# Patient Record
Sex: MALE | Race: WHITE | Employment: FULL TIME | URBAN - METROPOLITAN AREA
[De-identification: names, ages, dates, MRNs, and addresses within clinical notes are randomized per-mention and may not be internally consistent; named-entity substitution may affect disease eponyms.]

---

## 2017-08-10 ENCOUNTER — OFFICE VISIT (OUTPATIENT)
Dept: INTERNAL MEDICINE CLINIC | Age: 22
End: 2017-08-10

## 2017-08-10 VITALS
OXYGEN SATURATION: 97 % | TEMPERATURE: 98.2 F | BODY MASS INDEX: 25.11 KG/M2 | HEIGHT: 67 IN | WEIGHT: 160 LBS | SYSTOLIC BLOOD PRESSURE: 128 MMHG | HEART RATE: 101 BPM | RESPIRATION RATE: 16 BRPM | DIASTOLIC BLOOD PRESSURE: 76 MMHG

## 2017-08-10 DIAGNOSIS — M25.511 RIGHT SHOULDER PAIN, UNSPECIFIED CHRONICITY: Primary | ICD-10-CM

## 2017-08-10 PROCEDURE — 99202 OFFICE O/P NEW SF 15 MIN: CPT | Performed by: INTERNAL MEDICINE

## 2017-08-10 RX ORDER — BUPROPION HYDROCHLORIDE 200 MG/1
200 TABLET, EXTENDED RELEASE ORAL
COMMUNITY
Start: 2017-05-30 | End: 2020-08-10 | Stop reason: CLARIF

## 2017-08-10 ASSESSMENT — PATIENT HEALTH QUESTIONNAIRE - PHQ9
10. IF YOU CHECKED OFF ANY PROBLEMS, HOW DIFFICULT HAVE THESE PROBLEMS MADE IT FOR YOU TO DO YOUR WORK, TAKE CARE OF THINGS AT HOME, OR GET ALONG WITH OTHER PEOPLE: 1
SUM OF ALL RESPONSES TO PHQ QUESTIONS 1-9: 8
5. POOR APPETITE OR OVEREATING: 0
8. MOVING OR SPEAKING SO SLOWLY THAT OTHER PEOPLE COULD HAVE NOTICED. OR THE OPPOSITE, BEING SO FIGETY OR RESTLESS THAT YOU HAVE BEEN MOVING AROUND A LOT MORE THAN USUAL: 1
4. FEELING TIRED OR HAVING LITTLE ENERGY: 1
1. LITTLE INTEREST OR PLEASURE IN DOING THINGS: 1
SUM OF ALL RESPONSES TO PHQ9 QUESTIONS 1 & 2: 3
9. THOUGHTS THAT YOU WOULD BE BETTER OFF DEAD, OR OF HURTING YOURSELF: 0
6. FEELING BAD ABOUT YOURSELF - OR THAT YOU ARE A FAILURE OR HAVE LET YOURSELF OR YOUR FAMILY DOWN: 0
3. TROUBLE FALLING OR STAYING ASLEEP: 1
7. TROUBLE CONCENTRATING ON THINGS, SUCH AS READING THE NEWSPAPER OR WATCHING TELEVISION: 2
2. FEELING DOWN, DEPRESSED OR HOPELESS: 2

## 2018-08-27 ENCOUNTER — TELEPHONE (OUTPATIENT)
Dept: INTERNAL MEDICINE CLINIC | Age: 23
End: 2018-08-27

## 2019-08-28 ENCOUNTER — OFFICE VISIT (OUTPATIENT)
Dept: PRIMARY CARE CLINIC | Age: 24
End: 2019-08-28
Payer: COMMERCIAL

## 2019-08-28 VITALS
RESPIRATION RATE: 12 BRPM | HEART RATE: 84 BPM | WEIGHT: 170.4 LBS | HEIGHT: 67 IN | SYSTOLIC BLOOD PRESSURE: 120 MMHG | TEMPERATURE: 98.1 F | DIASTOLIC BLOOD PRESSURE: 80 MMHG | OXYGEN SATURATION: 99 % | BODY MASS INDEX: 26.74 KG/M2

## 2019-08-28 DIAGNOSIS — Z23 NEED FOR TDAP VACCINATION: ICD-10-CM

## 2019-08-28 DIAGNOSIS — Z00.00 ANNUAL PHYSICAL EXAM: ICD-10-CM

## 2019-08-28 DIAGNOSIS — R10.11 RIGHT UPPER QUADRANT ABDOMINAL PAIN: ICD-10-CM

## 2019-08-28 DIAGNOSIS — K59.00 CONSTIPATION, UNSPECIFIED CONSTIPATION TYPE: ICD-10-CM

## 2019-08-28 DIAGNOSIS — Z00.00 ANNUAL PHYSICAL EXAM: Primary | ICD-10-CM

## 2019-08-28 LAB
A/G RATIO: 2.6 (ref 1.1–2.2)
ALBUMIN SERPL-MCNC: 4.9 G/DL (ref 3.4–5)
ALP BLD-CCNC: 77 U/L (ref 40–129)
ALT SERPL-CCNC: 29 U/L (ref 10–40)
ANION GAP SERPL CALCULATED.3IONS-SCNC: 14 MMOL/L (ref 3–16)
AST SERPL-CCNC: 22 U/L (ref 15–37)
BASOPHILS ABSOLUTE: 0 K/UL (ref 0–0.2)
BASOPHILS RELATIVE PERCENT: 0.5 %
BILIRUB SERPL-MCNC: 0.3 MG/DL (ref 0–1)
BILIRUBIN URINE: NEGATIVE
BLOOD, URINE: NEGATIVE
BUN BLDV-MCNC: 16 MG/DL (ref 7–20)
CALCIUM SERPL-MCNC: 9.6 MG/DL (ref 8.3–10.6)
CHLORIDE BLD-SCNC: 104 MMOL/L (ref 99–110)
CHOLESTEROL, TOTAL: 90 MG/DL (ref 0–199)
CLARITY: CLEAR
CO2: 24 MMOL/L (ref 21–32)
COLOR: YELLOW
CREAT SERPL-MCNC: 1.1 MG/DL (ref 0.9–1.3)
EOSINOPHILS ABSOLUTE: 0.2 K/UL (ref 0–0.6)
EOSINOPHILS RELATIVE PERCENT: 3.6 %
GFR AFRICAN AMERICAN: >60
GFR NON-AFRICAN AMERICAN: >60
GLOBULIN: 1.9 G/DL
GLUCOSE BLD-MCNC: 108 MG/DL (ref 70–99)
GLUCOSE URINE: NEGATIVE MG/DL
HCT VFR BLD CALC: 46.3 % (ref 40.5–52.5)
HDLC SERPL-MCNC: 32 MG/DL (ref 40–60)
HEMOGLOBIN: 15.4 G/DL (ref 13.5–17.5)
KETONES, URINE: NEGATIVE MG/DL
LDL CHOLESTEROL CALCULATED: 31 MG/DL
LEUKOCYTE ESTERASE, URINE: NEGATIVE
LYMPHOCYTES ABSOLUTE: 1.5 K/UL (ref 1–5.1)
LYMPHOCYTES RELATIVE PERCENT: 22.9 %
MCH RBC QN AUTO: 30.7 PG (ref 26–34)
MCHC RBC AUTO-ENTMCNC: 33.3 G/DL (ref 31–36)
MCV RBC AUTO: 92.1 FL (ref 80–100)
MICROSCOPIC EXAMINATION: NORMAL
MONOCYTES ABSOLUTE: 0.7 K/UL (ref 0–1.3)
MONOCYTES RELATIVE PERCENT: 10.2 %
NEUTROPHILS ABSOLUTE: 4.2 K/UL (ref 1.7–7.7)
NEUTROPHILS RELATIVE PERCENT: 62.8 %
NITRITE, URINE: NEGATIVE
PDW BLD-RTO: 13.3 % (ref 12.4–15.4)
PH UA: 5 (ref 5–8)
PLATELET # BLD: 234 K/UL (ref 135–450)
PMV BLD AUTO: 9 FL (ref 5–10.5)
POTASSIUM SERPL-SCNC: 4.7 MMOL/L (ref 3.5–5.1)
PROTEIN UA: NEGATIVE MG/DL
RBC # BLD: 5.02 M/UL (ref 4.2–5.9)
SODIUM BLD-SCNC: 142 MMOL/L (ref 136–145)
SPECIFIC GRAVITY UA: 1.02 (ref 1–1.03)
TOTAL PROTEIN: 6.8 G/DL (ref 6.4–8.2)
TRIGL SERPL-MCNC: 134 MG/DL (ref 0–150)
TSH SERPL DL<=0.05 MIU/L-ACNC: 1.94 UIU/ML (ref 0.27–4.2)
URINE TYPE: NORMAL
UROBILINOGEN, URINE: 0.2 E.U./DL
VLDLC SERPL CALC-MCNC: 27 MG/DL
WBC # BLD: 6.7 K/UL (ref 4–11)

## 2019-08-28 PROCEDURE — 90715 TDAP VACCINE 7 YRS/> IM: CPT | Performed by: INTERNAL MEDICINE

## 2019-08-28 PROCEDURE — 90471 IMMUNIZATION ADMIN: CPT | Performed by: INTERNAL MEDICINE

## 2019-08-28 PROCEDURE — 99395 PREV VISIT EST AGE 18-39: CPT | Performed by: INTERNAL MEDICINE

## 2019-08-28 RX ORDER — DOCUSATE SODIUM 100 MG/1
100 CAPSULE, LIQUID FILLED ORAL 2 TIMES DAILY
Qty: 60 CAPSULE | Refills: 3 | Status: SHIPPED | OUTPATIENT
Start: 2019-08-28 | End: 2019-11-19

## 2019-08-28 RX ORDER — POLYETHYLENE GLYCOL 3350 17 G/17G
17 POWDER, FOR SOLUTION ORAL DAILY
Qty: 510 G | Refills: 3 | Status: SHIPPED | OUTPATIENT
Start: 2019-08-28 | End: 2019-09-27

## 2019-08-28 ASSESSMENT — PATIENT HEALTH QUESTIONNAIRE - PHQ9
SUM OF ALL RESPONSES TO PHQ QUESTIONS 1-9: 0
SUM OF ALL RESPONSES TO PHQ9 QUESTIONS 1 & 2: 0
2. FEELING DOWN, DEPRESSED OR HOPELESS: 0
SUM OF ALL RESPONSES TO PHQ QUESTIONS 1-9: 0
1. LITTLE INTEREST OR PLEASURE IN DOING THINGS: 0

## 2019-08-28 ASSESSMENT — ENCOUNTER SYMPTOMS
SINUS PAIN: 0
ABDOMINAL DISTENTION: 0
SORE THROAT: 0
ABDOMINAL PAIN: 1
EYE PAIN: 0
TROUBLE SWALLOWING: 0
FACIAL SWELLING: 0
ANAL BLEEDING: 0
EYE DISCHARGE: 0
WHEEZING: 0
EYE ITCHING: 0
NAUSEA: 0
RECTAL PAIN: 0
RHINORRHEA: 0
CONSTIPATION: 1
COUGH: 0
DIARRHEA: 1
VOMITING: 0
SINUS PRESSURE: 0
APNEA: 0
VOICE CHANGE: 0
SHORTNESS OF BREATH: 0
BLOOD IN STOOL: 0
CHEST TIGHTNESS: 0
PHOTOPHOBIA: 0
CHOKING: 0
BACK PAIN: 0
EYE REDNESS: 0

## 2019-08-28 NOTE — PATIENT INSTRUCTIONS
outdoors from 10 a.m. to 4 p.m., stay in the shade or cover up with clothing and a hat with a wide brim. Wear sunglasses that block UV rays. Even when it's cloudy, put broad-spectrum sunscreen (SPF 30 or higher) on any exposed skin. · See a dentist one or two times a year for checkups and to have your teeth cleaned. · Wear a seat belt in the car. Follow your doctor's advice about when to have certain tests. These tests can spot problems early. For everyone  · Cholesterol. Have the fat (cholesterol) in your blood tested after age 21. Your doctor will tell you how often to have this done based on your age, family history, or other things that can increase your risk for heart disease. · Blood pressure. Have your blood pressure checked during a routine doctor visit. Your doctor will tell you how often to check your blood pressure based on your age, your blood pressure results, and other factors. · Vision. Talk with your doctor about how often to have a glaucoma test.  · Diabetes. Ask your doctor whether you should have tests for diabetes. · Colon cancer. Your risk for colorectal cancer gets higher as you get older. Some experts say that adults should start regular screening at age 48 and stop at age 76. Others say to start before age 48 or continue after age 76. Talk with your doctor about your risk and when to start and stop screening. For women  · Breast exam and mammogram. Talk to your doctor about when you should have a clinical breast exam and a mammogram. Medical experts differ on whether and how often women under 50 should have these tests. Your doctor can help you decide what is right for you. · Cervical cancer screening test and pelvic exam. Begin with a Pap test at age 24. The test often is part of a pelvic exam. Starting at age 27, you may choose to have a Pap test, an HPV test, or both tests at the same time (called co-testing). Talk with your doctor about how often to have testing.   · Tests for complications from pertussis. Another vaccine, called Td, protects against tetanus and diphtheria, but not pertussis. A Td booster should be given every 10 years. Tdap may be given as one of these boosters if you have never gotten Tdap before. Tdap may also be given after a severe cut or burn to prevent tetanus infection. Your doctor or the person giving you the vaccine can give you more information. Tdap may safely be given at the same time as other vaccines. Some people should not get this vaccine  · A person who has ever had a life-threatening allergic reaction after a previous dose of any diphtheria-, tetanus-, or pertussis-containing vaccine, OR has a severe allergy to any part of this vaccine, should not get Tdap vaccine. Tell the person giving the vaccine about any severe allergies. · Anyone who had coma or long repeated seizures within 7 days after a childhood dose of DTP or DTaP, or a previous dose of Tdap, should not get Tdap, unless a cause other than the vaccine was found. They can still get Td. · Talk to your doctor if you:  ? Have seizures or another nervous system problem. ? Had severe pain or swelling after any vaccine containing diphtheria, tetanus, or pertussis. ? Ever had a condition called Guillain-Barré Syndrome (GBS). ? Aren't feeling well on the day the shot is scheduled. Risks  With any medicine, including vaccines, there is a chance of side effects. These are usually mild and go away on their own. Serious reactions are also possible but are rare. Most people who get Tdap vaccine do not have any problems with it.   Mild problems following Tdap  (Did not interfere with activities)  · Pain where the shot was given (about 3 in 4 adolescents or 2 in 3 adults)  · Redness or swelling where the shot was given (about 1 person in 5)  · Mild fever of at least 100.4°F (up to about 1 in 25 adolescents or 1 in 100 adults)  · Headache (about 3 or 4 people in 10)  · Tiredness (about 1 person in 3 reaction, very high fever, or unusual behavior. Signs of a severe allergic reaction can include hives, swelling of the face and throat, difficulty breathing, a fast heartbeat, dizziness, and weakness. These would usually start a few minutes to a few hours after the vaccination. What should I do? · If you think it is a severe allergic reaction or other emergency that can't wait, call 9-1-1 or get the person to the nearest hospital. Otherwise, call your doctor. · Afterward, the reaction should be reported to the Vaccine Adverse Event Reporting System (VAERS). Your doctor might file this report, or you can do it yourself through the VAERS web site at www.vaers. [facility].gov, or by calling 1-337.617.4927. VAERS does not give medical advice. The National Vaccine Injury Compensation Program  The National Vaccine Injury Compensation Program (VICP) is a federal program that was created to compensate people who may have been injured by certain vaccines. Persons who believe they may have been injured by a vaccine can learn about the program and about filing a claim by calling 7-603.915.6709 or visiting the [employer] website at www.[facility].gov/vaccinecompensation. There is a time limit to file a claim for compensation. How can I learn more? · Ask your doctor. He or she can give you the vaccine package insert or suggest other sources of information. · Call your local or state health department. · Contact the Centers for Disease Control and Prevention (CDC):  ? Call 8-617.374.4903 (1-800-CDC-INFO) or  ? Visit CDC's website at www.cdc.gov/vaccines  Vaccine Information Statement (Interim)  Tdap Vaccine  (2/24/15)  [address] 466AC-10  Department of Health and Human Services  Centers for Disease Control and Prevention  Many Vaccine Information Statements are available in [place] and other languages. See www.immunize.org/vis. Muchas hojas de información sobre vacunas están disponibles en español y en otros idiomas.  Visite www.immunize.org/vis. Care instructions adapted under license by [address] If you have questions about a medical condition or this instruction, always ask your healthcare professional. [address] any warranty or liability for your use of this information.

## 2019-08-28 NOTE — PROGRESS NOTES
supple. Carotid bruit is not present. No thyromegaly present. Cardiovascular: Normal rate, regular rhythm, S1 normal, S2 normal, normal heart sounds and intact distal pulses. Exam reveals no gallop and no friction rub. No murmur heard. Pulmonary/Chest: Effort normal and breath sounds normal. No respiratory distress. He has no wheezes. He has no rhonchi. He has no rales. Abdominal: Soft. Bowel sounds are normal. He exhibits no distension. There is no hepatosplenomegaly. There is no tenderness. There is no rebound and no guarding. Hernia confirmed negative in the right inguinal area and confirmed negative in the left inguinal area. Genitourinary: Testes normal and penis normal.   Musculoskeletal: Normal range of motion. He exhibits no edema or tenderness. Right shoulder: He exhibits normal range of motion and no tenderness. Left shoulder: He exhibits normal range of motion and no tenderness. Right elbow: He exhibits normal range of motion and no swelling. No tenderness found. Left elbow: He exhibits normal range of motion and no swelling. No tenderness found. Right wrist: He exhibits no tenderness and no swelling. Left wrist: He exhibits no tenderness and no swelling. Right hip: He exhibits normal range of motion and no tenderness. Left hip: He exhibits normal range of motion and no tenderness. Right knee: He exhibits normal range of motion and no swelling. No tenderness found. Left knee: He exhibits normal range of motion and no swelling. No tenderness found. Right ankle: He exhibits normal range of motion and no swelling. No tenderness. Left ankle: He exhibits normal range of motion and no swelling. No tenderness. Cervical back: He exhibits normal range of motion, no tenderness and no spasm. Thoracic back: He exhibits no tenderness and no spasm.         Lumbar back: He exhibits normal range of motion, no

## 2019-08-30 ENCOUNTER — HOSPITAL ENCOUNTER (OUTPATIENT)
Dept: ULTRASOUND IMAGING | Age: 24
Discharge: HOME | End: 2019-08-30
Payer: COMMERCIAL

## 2019-08-30 DIAGNOSIS — R10.11 RIGHT UPPER QUADRANT ABDOMINAL PAIN: ICD-10-CM

## 2019-08-30 PROCEDURE — 76700 US EXAM ABDOM COMPLETE: CPT

## 2019-11-19 ENCOUNTER — OFFICE VISIT (OUTPATIENT)
Dept: PRIMARY CARE CLINIC | Age: 24
End: 2019-11-19
Payer: COMMERCIAL

## 2019-11-19 VITALS
HEART RATE: 91 BPM | WEIGHT: 169 LBS | BODY MASS INDEX: 26.47 KG/M2 | SYSTOLIC BLOOD PRESSURE: 132 MMHG | DIASTOLIC BLOOD PRESSURE: 78 MMHG | OXYGEN SATURATION: 98 %

## 2019-11-19 DIAGNOSIS — S43.431A TEAR OF RIGHT GLENOID LABRUM, INITIAL ENCOUNTER: Primary | ICD-10-CM

## 2019-11-19 PROBLEM — F32.A DEPRESSION: Status: ACTIVE | Noted: 2019-11-19

## 2019-11-19 PROBLEM — K59.00 CONSTIPATION: Status: ACTIVE | Noted: 2019-11-19

## 2019-11-19 PROBLEM — F32.A DEPRESSION: Chronic | Status: ACTIVE | Noted: 2019-11-19

## 2019-11-19 PROCEDURE — 99213 OFFICE O/P EST LOW 20 MIN: CPT | Performed by: NURSE PRACTITIONER

## 2019-11-19 ASSESSMENT — ENCOUNTER SYMPTOMS
COUGH: 0
CHEST TIGHTNESS: 0
SHORTNESS OF BREATH: 0
BACK PAIN: 0
WHEEZING: 0
COLOR CHANGE: 0

## 2019-11-22 ENCOUNTER — HOSPITAL ENCOUNTER (OUTPATIENT)
Dept: MRI IMAGING | Age: 24
Discharge: HOME | End: 2019-11-22
Payer: COMMERCIAL

## 2019-11-22 DIAGNOSIS — S43.431A TEAR OF RIGHT GLENOID LABRUM, INITIAL ENCOUNTER: ICD-10-CM

## 2019-11-22 PROCEDURE — 73221 MRI JOINT UPR EXTREM W/O DYE: CPT

## 2019-11-25 ENCOUNTER — OFFICE VISIT (OUTPATIENT)
Dept: ORTHOPEDIC SURGERY | Age: 24
End: 2019-11-25
Payer: COMMERCIAL

## 2019-11-25 VITALS
WEIGHT: 169.09 LBS | BODY MASS INDEX: 26.54 KG/M2 | HEART RATE: 101 BPM | HEIGHT: 67 IN | DIASTOLIC BLOOD PRESSURE: 90 MMHG | SYSTOLIC BLOOD PRESSURE: 132 MMHG

## 2019-11-25 DIAGNOSIS — M19.011 ARTHRITIS OF RIGHT ACROMIOCLAVICULAR JOINT: Primary | ICD-10-CM

## 2019-11-25 DIAGNOSIS — M75.91 SUPRASPINATUS TENDINITIS, RIGHT: ICD-10-CM

## 2019-11-25 PROCEDURE — 99243 OFF/OP CNSLTJ NEW/EST LOW 30: CPT | Performed by: INTERNAL MEDICINE

## 2019-11-25 RX ORDER — KETOROLAC TROMETHAMINE 10 MG/1
10 TABLET, FILM COATED ORAL 3 TIMES DAILY
Qty: 15 TABLET | Refills: 0 | Status: SHIPPED | OUTPATIENT
Start: 2019-11-25 | End: 2020-02-06

## 2020-01-30 ENCOUNTER — OFFICE VISIT (OUTPATIENT)
Dept: PRIMARY CARE CLINIC | Age: 25
End: 2020-01-30
Payer: COMMERCIAL

## 2020-01-30 VITALS
DIASTOLIC BLOOD PRESSURE: 74 MMHG | HEART RATE: 78 BPM | SYSTOLIC BLOOD PRESSURE: 120 MMHG | BODY MASS INDEX: 26.31 KG/M2 | TEMPERATURE: 97.7 F | WEIGHT: 168 LBS | OXYGEN SATURATION: 99 %

## 2020-01-30 PROCEDURE — 99213 OFFICE O/P EST LOW 20 MIN: CPT | Performed by: INTERNAL MEDICINE

## 2020-01-30 RX ORDER — DICLOFENAC SODIUM 75 MG/1
75 TABLET, DELAYED RELEASE ORAL 2 TIMES DAILY
Qty: 30 TABLET | Refills: 3 | Status: SHIPPED | OUTPATIENT
Start: 2020-01-30 | End: 2020-02-15 | Stop reason: SDUPTHER

## 2020-01-30 NOTE — PROGRESS NOTES
Subjective:      Patient ID: Luann Sanchez is a 25 y.o. male. HPI  C/o bilateral wrist and hand pain for the last few weeks,  He types a lot at work,  [employer], that helps,  No tingling or numbness of the hands,   No weakness of hand muscles,  No acute injuries,   Also has mid to lower back pain, for the last 2 days,   Came on suddenly, no radiation of the pain,   No paresthesias,   No injuries or sprains,   No allergy,   Review of Systems  All the review of systems negative except above   Objective:   Physical Exam  /74 (Site: Left Upper Arm, Position: Sitting, Cuff Size: Medium Adult)   Pulse 78   Temp 97.7 °F (36.5 °C) (Oral)   Wt 168 lb (76.2 kg)   SpO2 99%   BMI 26.31 kg/m²    The physical exam reveals a patient who appears well, alert and oriented x 3, pleasant, cooperative. Vitals are as noted. Head is atraumatic and normocephalic. Eyes reveal normal conjunctiva, cornea normal, pupils are equal and rective to light. Nasal mucosa is normal. Throat is normal without exudates. Ears reveal normal tympanic membranes. Neck is supple and free of adenopathy, or masses. No thyromegaly. No jugular venous distension. Lungs are clear to auscultation, no rales or rhonchi noted. Heart sounds are regular , no murmurs, clicks, gallops or rubs. Abdomen is soft, no tenderness, masses or organomegaly. Bowel sounds are normally heard. Pelvis: normal. Extremities are normal. Peripheral pulses are normal. Screening neurological exam is normal without focal findings. Cranial nerves are intact, reflexes are symmetrical and muscle strength eaqual. Skin is normal without suspicious lesions noted. Assessment:      Bilateral hand pain- synovitis. Mid back pain- starin      Plan: Will rty diclofenac 75 mg po biod for 15 days.         Gavi Clark MD

## 2020-02-05 ENCOUNTER — NURSE TRIAGE (OUTPATIENT)
Dept: OTHER | Facility: CLINIC | Age: 25
End: 2020-02-05

## 2020-02-06 ENCOUNTER — OFFICE VISIT (OUTPATIENT)
Dept: PRIMARY CARE CLINIC | Age: 25
End: 2020-02-06
Payer: COMMERCIAL

## 2020-02-06 VITALS
HEART RATE: 100 BPM | TEMPERATURE: 97.9 F | SYSTOLIC BLOOD PRESSURE: 126 MMHG | OXYGEN SATURATION: 100 % | HEIGHT: 67 IN | WEIGHT: 167.2 LBS | DIASTOLIC BLOOD PRESSURE: 88 MMHG | BODY MASS INDEX: 26.24 KG/M2

## 2020-02-06 LAB — HBA1C MFR BLD: 4.8 %

## 2020-02-06 PROCEDURE — 83036 HEMOGLOBIN GLYCOSYLATED A1C: CPT | Performed by: INTERNAL MEDICINE

## 2020-02-06 PROCEDURE — 99213 OFFICE O/P EST LOW 20 MIN: CPT | Performed by: INTERNAL MEDICINE

## 2020-02-06 NOTE — PROGRESS NOTES
exhibits tenderness. He exhibits normal range of motion and no swelling. Left hand: He exhibits normal range of motion, no tenderness and no swelling. Normal sensation noted. Normal strength noted.            Results for POC orders placed in visit on 02/06/20   POCT glycosylated hemoglobin (Hb A1C)   Result Value Ref Range    Hemoglobin A1C 4.8 %     Lab Review   Orders Only on 08/28/2019   Component Date Value    Color, UA 08/28/2019 YELLOW     Clarity, UA 08/28/2019 Clear     Glucose, Ur 08/28/2019 Negative     Bilirubin Urine 08/28/2019 Negative     Ketones, Urine 08/28/2019 Negative     Specific Gravity, UA 08/28/2019 1.017     Blood, Urine 08/28/2019 Negative     pH, UA 08/28/2019 5.0     Protein, UA 08/28/2019 Negative     Urobilinogen, Urine 08/28/2019 0.2     Nitrite, Urine 08/28/2019 Negative     Leukocyte Esterase, Urine 08/28/2019 Negative     Microscopic Examination 08/28/2019 Not Indicated     Urine Type 08/28/2019 Not Specified    Orders Only on 08/28/2019   Component Date Value    TSH 08/28/2019 1.94     Cholesterol, Total 08/28/2019 90     Triglycerides 08/28/2019 134     HDL 08/28/2019 32*    LDL Calculated 08/28/2019 31     VLDL Cholesterol Calcula* 08/28/2019 27     Sodium 08/28/2019 142     Potassium 08/28/2019 4.7     Chloride 08/28/2019 104     CO2 08/28/2019 24     Anion Gap 08/28/2019 14     Glucose 08/28/2019 108*    BUN 08/28/2019 16     CREATININE 08/28/2019 1.1     GFR Non- 08/28/2019 >60     GFR  08/28/2019 >60     Calcium 08/28/2019 9.6     Total Protein 08/28/2019 6.8     Alb 08/28/2019 4.9     Albumin/Globulin Ratio 08/28/2019 2.6*    Total Bilirubin 08/28/2019 0.3     Alkaline Phosphatase 08/28/2019 77     ALT 08/28/2019 29     AST 08/28/2019 22     Globulin 08/28/2019 1.9     WBC 08/28/2019 6.7     RBC 08/28/2019 5.02     Hemoglobin 08/28/2019 15.4     Hematocrit 08/28/2019 46.3     MCV 08/28/2019 92.1  MCH 08/28/2019 30.7     MCHC 08/28/2019 33.3     RDW 08/28/2019 13.3     Platelets 16/68/2898 234     MPV 08/28/2019 9.0     Neutrophils % 08/28/2019 62.8     Lymphocytes % 08/28/2019 22.9     Monocytes % 08/28/2019 10.2     Eosinophils % 08/28/2019 3.6     Basophils % 08/28/2019 0.5     Neutrophils Absolute 08/28/2019 4.2     Lymphocytes Absolute 08/28/2019 1.5     Monocytes Absolute 08/28/2019 0.7     Eosinophils Absolute 08/28/2019 0.2     Basophils Absolute 08/28/2019 0.0          Assessment/Plan     1. Pain in both wrists  - XR WRIST LEFT (2 VIEWS); Future  - XR WRIST RIGHT (2 VIEWS); Future  -Continue Diclofenac 75mg 2 times daily as needed  - Referral to [facility], Hand [facility] (Hand, Wrist, Elbow), [place]    2. Numbness and tingling in left hand  - Referral to [facility] (Hand, Wrist, Elbow), [place]  - EMG; Future    3. Hyperglycemia  - POCT glycosylated hemoglobin (Hb A1C)      Return if symptoms worsen or fail to improve.

## 2020-02-11 ENCOUNTER — OFFICE VISIT (OUTPATIENT)
Dept: ORTHOPEDIC SURGERY | Age: 25
End: 2020-02-11
Payer: COMMERCIAL

## 2020-02-11 VITALS — HEIGHT: 67 IN | RESPIRATION RATE: 16 BRPM | BODY MASS INDEX: 26.23 KG/M2 | WEIGHT: 167.11 LBS

## 2020-02-11 PROBLEM — M77.8 EXTENSOR TENDINITIS OF HAND: Status: ACTIVE | Noted: 2020-02-11

## 2020-02-11 PROBLEM — M25.532 PAIN IN BOTH WRISTS: Status: ACTIVE | Noted: 2020-02-11

## 2020-02-11 PROBLEM — M25.531 PAIN IN BOTH WRISTS: Status: ACTIVE | Noted: 2020-02-11

## 2020-02-11 PROCEDURE — 99243 OFF/OP CNSLTJ NEW/EST LOW 30: CPT | Performed by: ORTHOPAEDIC SURGERY

## 2020-02-11 PROCEDURE — L3908 WHO COCK-UP NONMOLDE PRE OTS: HCPCS | Performed by: ORTHOPAEDIC SURGERY

## 2020-02-11 NOTE — PROGRESS NOTES
Chief Complaint  Wrist Pain (NP RAIN WRISTS)      History of Present Illness: Maribel Sharma is a 25 y.o. male. He presents today for a new hand surgery specialty consultation at the request of Dr. Farshad Dye regarding both hands. He has been doing  and tech-support and over the last several weeks has noticed some occasional discomfort when he reaches with his left small finger but also some fullness and soreness and occasional tingling on both sides. He uses a mouse on the right hand and uses his left hand for keying. He has not had any other recent injury and is active otherwise recreationally doing video games and working out at [employer]. Medical History  Patient's medications, allergies, past medical, surgical, social and family histories were reviewed and updated as appropriate. Review of Systems  Pertinent items are noted in HPI  Denies fever, chills, confusion, bowel/bladder active change. Review of systems reviewed from Patient History Form dated on 2/11/20 and available in the patient's chart under the Media tab. Vital Signs  Vitals:    02/11/20 1031   Resp: 16       General Exam:   Constitutional: Patient is adequately groomed with no evidence of malnutrition  Mental Status: The patient is oriented to time, place and person. The patient's mood and affect are appropriate. Lymphatic: The lymphatic examination bilaterally reveals all areas to be without enlargement or induration. Neurological: The patient has good coordination. There is no weakness or sensory deficit. Hand Examination  Inspection: There is no visual deformity or rash or areas of significant swelling    Palpation: There is some diffuse tenderness over the fourth dorsal extensor mechanism to both right and left hands with some tenderness also along the extensor bocanegra to the left small finger. There is no tenderness over the carpal canal, radial styloid, or over the wrist carpus itself.     Range of Motion: Full range of motion without triggering or without tendon dysfunction    Strength: Normal    Special Tests: Provocative testing for carpal tunnel syndrome and cubital tunnel syndrome are negative. ECU Synergy sign is negative. There is mild pain with resisted wrist and finger extension. Skin: There are no additional worrisome rashes, ulcerations or lesions. Gait: normal    Circulation: well perfused    Additional Comments:     Additional Examinations:  Contralateral Exam: Pertinent positives as noted above       Radiology:     X-rays obtained and reviewed in office:  Views    Location    Impression        Assessment: Bilateral hand and wrist pain most consistent with intermittent extensor tendinitis    Impression:   Encounter Diagnoses   Name Primary?  Bilateral wrist pain Yes    Pain in both wrists     Extensor tendinitis of hand        Office Procedures:  Orders Placed This Encounter   Procedures    [facility] Occupation Therapy     Referral Priority:   Routine     Referral Type:   Eval and Treat     Referral Reason:   Specialty Services Required     Requested Specialty:   Occupational Therapy     Number of Visits Requested:   1    Latricia Anthony Titan Wrist Short Brace     Patient was prescribed a Latricia Anthony Titan Wrist Orthosis. The right wrist will require stabilization / immobilization from this semi-rigid / rigid orthosis to improve their function. The orthosis will assist in protecting the affected area, provide functional support and facilitate healing. The patient was educated and fit by a healthcare professional with expert knowledge and specialization in brace application while under the direct supervision of the treating physician. Verbal and written instructions for the use of and application of this item were provided.    They were instructed to contact the office immediately should the brace result in increased pain, decreased sensation, increased swelling or worsening of the condition. Treatment Plan: I did reassure the patient that I certainly do not see any convincing evidence of compressive neuropathy. I recommended some activity modifications including stretching, using a wrist brace at nighttime to maintain a neutral posture, and consideration of some formalized therapy if this does not start to resolve with simple interventions. After demonstrating the wrist bracing he decided to only take the right wrist brace. He does have a left wrist brace already at home. I will see him back on an as-needed basis. He does understand if his symptoms were to change and he had any more demonstrative numbness and tingling, following through with his electrodiagnostic test would be appropriate. Please note that this transcription was created using voice recognition software. Any errors are unintentional and may be due to voice recognition transcription.

## 2020-02-13 ASSESSMENT — ENCOUNTER SYMPTOMS: COLOR CHANGE: 0

## 2020-02-14 NOTE — TELEPHONE ENCOUNTER
Patient requesting a medication refill.   Medication:diclofenac (VOLTAREN) 75 MG EC tablet [[id number]]  Pharmacy: [address], [facility] [phone] [place] [phone]   last office visit:02/11/20  Next office visit: Visit date not found

## 2020-02-14 NOTE — TELEPHONE ENCOUNTER
Medication:   Requested Prescriptions     Pending Prescriptions Disp Refills    diclofenac (VOLTAREN) 75 MG EC tablet 30 tablet 3     Sig: Take 1 tablet by mouth 2 times daily for 15 days     Last Filled:  1/30/20    Last appt: 2/6/2020   Next appt: Visit date not found

## 2020-02-15 RX ORDER — DICLOFENAC SODIUM 75 MG/1
75 TABLET, DELAYED RELEASE ORAL 2 TIMES DAILY
Qty: 60 TABLET | Refills: 1 | Status: SHIPPED | OUTPATIENT
Start: 2020-02-15 | End: 2020-04-06 | Stop reason: SDUPTHER

## 2020-03-03 ENCOUNTER — HOSPITAL ENCOUNTER (OUTPATIENT)
Dept: OCCUPATIONAL THERAPY | Age: 25
Setting detail: THERAPIES SERIES
Discharge: HOME | End: 2020-03-03
Payer: COMMERCIAL

## 2020-03-03 ENCOUNTER — OFFICE VISIT (OUTPATIENT)
Dept: PRIMARY CARE CLINIC | Age: 25
End: 2020-03-03
Payer: COMMERCIAL

## 2020-03-03 VITALS
WEIGHT: 163.6 LBS | BODY MASS INDEX: 26.29 KG/M2 | HEIGHT: 66 IN | HEART RATE: 109 BPM | DIASTOLIC BLOOD PRESSURE: 100 MMHG | TEMPERATURE: 98.5 F | SYSTOLIC BLOOD PRESSURE: 120 MMHG | OXYGEN SATURATION: 99 %

## 2020-03-03 LAB
BILIRUBIN, POC: NORMAL
BLOOD URINE, POC: NORMAL
CLARITY, POC: CLEAR
COLOR, POC: NORMAL
GLUCOSE URINE, POC: NORMAL
KETONES, POC: NORMAL
LEUKOCYTE EST, POC: NORMAL
NITRITE, POC: NORMAL
PH, POC: 7
PROTEIN, POC: NORMAL
SPECIFIC GRAVITY, POC: 1.02
UROBILINOGEN, POC: 0.2

## 2020-03-03 PROCEDURE — 97022 WHIRLPOOL THERAPY: CPT | Performed by: OCCUPATIONAL THERAPIST

## 2020-03-03 PROCEDURE — 97112 NEUROMUSCULAR REEDUCATION: CPT | Performed by: OCCUPATIONAL THERAPIST

## 2020-03-03 PROCEDURE — 97110 THERAPEUTIC EXERCISES: CPT | Performed by: OCCUPATIONAL THERAPIST

## 2020-03-03 PROCEDURE — 97165 OT EVAL LOW COMPLEX 30 MIN: CPT | Performed by: OCCUPATIONAL THERAPIST

## 2020-03-03 PROCEDURE — 81002 URINALYSIS NONAUTO W/O SCOPE: CPT | Performed by: INTERNAL MEDICINE

## 2020-03-03 PROCEDURE — 99214 OFFICE O/P EST MOD 30 MIN: CPT | Performed by: INTERNAL MEDICINE

## 2020-03-03 RX ORDER — CYCLOBENZAPRINE HCL 5 MG
5 TABLET ORAL 3 TIMES DAILY PRN
Qty: 30 TABLET | Refills: 0 | Status: SHIPPED | OUTPATIENT
Start: 2020-03-03 | End: 2020-03-18 | Stop reason: SDUPTHER

## 2020-03-03 NOTE — PATIENT INSTRUCTIONS
1. Back muscle spasm  - POCT Urinalysis no Micro  - cyclobenzaprine (FLEXERIL) 5 MG tablet; Take 1 tablet by mouth 3 times daily as needed for Muscle spasms  Dispense: 30 tablet; Refill: 0  - back stretches    2. Neck pain  - cyclobenzaprine (FLEXERIL) 5 MG tablet; Take 1 tablet by mouth 3 times daily as needed for Muscle spasms  Dispense: 30 tablet; Refill: 0  -continue Diclofenac 75mg 2 times daily  -neck exercises    3. Elevated blood pressure reading  -blood pressure is high today  -monitor          Patient Education        Neck: Exercises  Introduction  Here are some examples of exercises for you to try. The exercises may be suggested for a condition or for rehabilitation. Start each exercise slowly. Ease off the exercises if you start to have pain. You will be told when to start these exercises and which ones will work best for you. How to do the exercises  Neck stretch   1. This stretch works best if you keep your shoulder down as you lean away from it. To help you remember to do this, start by relaxing your shoulders and lightly holding on to your thighs or your chair. 2. Tilt your head toward your shoulder and hold for 15 to 30 seconds. Let the weight of your head stretch your muscles. 3. If you would like a little added stretch, use your hand to gently and steadily pull your head toward your shoulder. For example, keeping your right shoulder down, lean your head to the left. 4. Repeat 2 to 4 times toward each shoulder. Diagonal neck stretch   1. Turn your head slightly toward the direction you will be stretching, and tilt your head diagonally toward your chest and hold for 15 to 30 seconds. 2. If you would like a little added stretch, use your hand to gently and steadily pull your head forward on the diagonal.  3. Repeat 2 to 4 times toward each side. Dorsal glide stretch   1. Sit or stand tall and look straight ahead.   2. Slowly tuck your chin as you glide your head backward over your body  3. Hold for a count of 6, and then relax for up to 10 seconds. 4. Repeat 8 to 12 times. Chest and shoulder stretch   1. Sit or stand tall and glide your head backward as in the dorsal glide stretch. 2. Raise both arms so that your hands are next to your ears. 3. Take a deep breath, and as you breathe out, lower your elbows down and behind your back. You will feel your shoulder blades slide down and together, and at the same time you will feel a stretch across your chest and the front of your shoulders. 4. Hold for about 6 seconds, and then relax for up to 10 seconds. 5. Repeat 8 to 12 times. Strengthening: Hands on head   1. Move your head backward, forward, and side to side against gentle pressure from your hands, holding each position for about 6 seconds. 2. Repeat 8 to 12 times. Follow-up care is a key part of your treatment and safety. Be sure to make and go to all appointments, and call your doctor if you are having problems. It's also a good idea to know your test results and keep a list of the medicines you take. Where can you learn more? Go to https://MeetLinksharepepicTasktop Technologies.OnAsset Intelligence. org and sign in to your XATA account. Enter P975 in the Crayon Data box to learn more about \"Neck: Exercises. \"     If you do not have an account, please click on the \"Sign Up Now\" link. Current as of: June 26, 2019  Content Version: 12.3  © 6942-9134 Healthwise, [employer]. Care instructions adapted under license by [facility] ([facility] [place]). If you have questions about a medical condition or this instruction, always ask your healthcare professional. [place] [address] any warranty or liability for your use of this information. Patient Education        Back Spasm: Care Instructions  Your Care Instructions  A back spasm is sudden tightness and pain in your back muscles. It may happen from overuse or an injury.  Things like sleeping in an awkward way, bending, lifting, standing, or sitting can sometimes cause a spasm. But the cause isn't always clear. Home treatment includes using heat or ice, taking over-the-counter (OTC) pain medicines, and avoiding activities that may cause back pain. For a back spasm that doesn't get better with home care, your doctor may prescribe medicine. Treatments such as massage or manipulation may also help ease a back spasm. Your doctor may also suggest exercise or physical therapy to help improve strength and flexibility in your back muscles. In most cases, getting back to your normal activities is good for your back. Just make sure to avoid doing things that make your pain worse. Follow-up care is a key part of your treatment and safety. Be sure to make and go to all appointments, and call your doctor if you are having problems. It's also a good idea to know your test results and keep a list of the medicines you take. How can you care for yourself at home?   Heat, ice, and medicines    · To relieve pain, use heat or ice (whichever feels better) on the affected area. ? Put a warm water bottle, a heating pad set on low, or a warm cloth on your back. Put a thin cloth between the heating pad and your skin. Do not go to sleep with a heating pad on your skin. ? Try ice or a cold pack on the area for 10 to 20 minutes at a time. Put a thin cloth between the ice and your skin.     · For most back pain you can take over-the-counter pain medicine. Nonsteroidal anti-inflammatory drugs (NSAIDs) such as ibuprofen or naproxen seem to work best. But if you can't take NSAIDs you can try acetaminophen. Your doctor can prescribe stronger medicines if needed. Be safe with medicines. Read and follow all instructions on the label.    Body positions and posture    · Sit or lie in positions that are most comfortable for you and that reduce pain. Try one of these positions when you lie down:  ? Lie on your back with your knees bent and supported by large pillows.   ? Lie on the pillow under your knees. Extend your arms comfortably to your sides. 2. Relax and breathe normally. 3. Remain in this position for about 10 minutes. 4. If you can, do this 2 or 3 times each day. Follow-up care is a key part of your treatment and safety. Be sure to make and go to all appointments, and call your doctor if you are having problems. It's also a good idea to know your test results and keep a list of the medicines you take. Where can you learn more? Go to https://orderboltpeTutum.BitComet. org and sign in to your Gameleon account. Enter C805 in the [employer] box to learn more about \"Back Stretches: Exercises. \"     If you do not have an account, please click on the \"Sign Up Now\" link. Current as of: June 26, 2019  Content Version: 12.3  © 8477-0175 Healthwise, Incorporated. Care instructions adapted under license by [facility] ([facility]). If you have questions about a medical condition or this instruction, always ask your healthcare professional. [address] any warranty or liability for your use of this information.

## 2020-03-03 NOTE — FLOWSHEET NOTE
improving balance, coordination, kinesthetic sense, posture, motor skill, proprioception  to assist with  scapular, scapulothoracic and UE control with self care, reaching, carrying, lifting, house/yardwork, driving/computer work.     Therapeutic Activities & NMR:    [x] (60565 or 09193) Provided verbal/tactile cueing for activities related to improving balance, coordination, kinesthetic sense, posture, motor skill, proprioception and motor activation to allow for proper function of scapular, scapulothoracic and UE control with self care, carrying, lifting, driving/computer work    Home Exercise Program:    [x] (94733) Reviewed/Progressed HEP activities related to strengthening, flexibility, endurance, ROM of scapular, scapulothoracic and UE control with self care, reaching, carrying, lifting, house/yardwork, driving/computer work  [] (74883) Reviewed/Progressed HEP activities related to improving balance, coordination, kinesthetic sense, posture, motor skill, proprioception of scapular, scapulothoracic and UE control with self care, reaching, carrying, lifting, house/yardwork, driving/computer work      Manual Treatments:  PROM / STM / Oscillations-Mobs:  G-I, II, III, IV (PA's, Inf., Post.)  [] (67334) Provided manual therapy to mobilize soft tissue/joints of cervical/CT, scapular GHJ and UE for the purpose of modulating pain, promoting relaxation,  increasing ROM, reducing/eliminating soft tissue swelling/inflammation/restriction, improving soft tissue extensibility and allowing for proper ROM for normal function with self care, reaching, carrying, lifting, house/yardwork, driving/computer work    ADL Training:  [x] (10521) Provided self-care/home management training related to activities of daily living and compensatory training, and/or use of adaptive equipment      Charges:  Timed Code Treatment Minutes: 35   Total Treatment Minutes: 70   Worker's Comp: Time In/Time Out     [x] EVAL (LOW) 76259 (typically 20

## 2020-03-03 NOTE — PLAN OF CARE
[facility], [place]  [address], [address] [facility], [address] [facility]  Phone: [phone] Fax: [phone]            Occupational Carlos Hamilton  Dear Referring Practitioner: Marc Delacruz MD,     We had the pleasure of evaluating the following patient for occupational therapy services at [address] [place] [address]. A summary of our findings can be found in the initial assessment below. This includes our plan of care. If you have any questions or concerns regarding these findings, please do not hesitate to contact me at the office phone number checked above.   Thank you for the referral.     Physician Signature:_______________________________Date:__________________  By signing above (or electronic signature), therapists plan is approved by physician      Patient: Maribel Sharma   : 1995   MRN: [id number]  Referring Physician: Referring Practitioner: Marc Delacruz MD      Evaluation Date: 3/3/2020      Medical Diagnosis Information:  Diagnosis: M77.9 (ICD-10-CM) - Extensor tendinitis of hand, M25.531, M25.532 (ICD-10-CM) - Bilateral wrist pain    Treatment Diagnosis: M25.531, M25.532 (ICD-10-CM) - Bilateral wrist pain              Insurance information:   Ault      Date of Injury: NA  Date of Surgery: NA    Date of Patient follow up with Physician: prn    RESTRICTIONS/PRECAUTIONS: -    Latex Allergy:  [x]No      []Yes  Pacemaker:  [x] No       [] Yes     Preferred Language for Healthcare:   [x]English       []other:     Functional Scale: 41% (Quick DASH)   Date assessed:  3/3/2020    SUBJECTIVE: Patient reported deficits/history of current problem: progressive pain in B wrists, and into SF on L; saw Dr Ole Werner ~ 2 weeks ago and have been wearing braces at night with minimal relief; reports increased pain primarily when playing guitar, keyboarding, writing, working out     Pain Scale: 0/10 at rest; 5/10 at times with above listed activities  []Constant      [x]Intermittent    []other:  Pain Location:  Circumferentially in wrist, and dorsal B hands  Easing factors: rest, meds (antiinflammatory)  Provocative factors: playing guitar, keyboarding, writing, working out     [x] Patient reported history, allergies, and medications reviewed - see intake form. 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    Recreational Activities/Meaningful Interests: guitar, working out, video games, soccer    Prior Level of Function: [x] Independent with ADLs/IADLs     [] Assistance needed (describe):    Patient-Identified Primary Performance Deficits (to be addressed in POC):   [] bathing    [x] household tasks    [] dressing    [] self feeding   [] grooming    [x] work/education   [] functional mobility   [] sleeping/rest   [] toileting/hygiene   [x] recreational activities   [] driving    [] community/social participation   [] other:     Comorbidities Affecting Functional Performance:     [x]Anxiety (F41.9)/Depression (F32.9)   []Diabetes Type 1(E10.65) or 2 (E11.65)   []Rheumatoid Arthritis (M05.9)  []Fibromyalgia (M79.7)  []Neuropathy(G60.9)  []Osteoarthritis(M19.91)  []None   [x]Other: R shoulder labral tear    Hand Dominance:    [x]  Right    [] Left      OBJECTIVE:    Involved Involved   AROM: Right Left   Digits: tips to DPFC   0cm   0cm   Thumb tip to DPFC 0cm 0cm   Wrist Ext/Flex            RD/UD 75/75  20/35 72/60  25/35   Forearm sup/pron   78/90 70/90   Elbow ext/flex   WNL WNL   Shoulder Flex                   Abd                   IR/ER WNL  (B shoulders with full overhead flexion, ER behind head, IR behind back) WNL        Edema:      At wrists 16.5cm 16.3cm        Strength:      II 75 (with mild pain) 65 (with mild pain)   Lateral Pinch 23 24   3 Point Pinch 18 15   Tip Pinch 11 9   MMT: shoulder profile, analysis of data from comprehensive assessment and consideration of multiple treatment options. Multiple comorbidities present that affect occupational performance. Significant task modifications or assistance needed to complete assessment. Evaluation Code:  [x] Low Complexity EVAL 57353 (typically 30 minutes face to face)  [] Mod Complexity EVAL 94950 (typically 45 minutes face to face)  [] High Complexity EVAL 99360 (typically 60 minutes face to face)    Electronically signed by:   Mavis Collins  OTR/L, PT, MPT, [address] [place] [address], [phone], [phone]

## 2020-03-03 NOTE — PROGRESS NOTES
Mark Velasquez   Date ofBirth:  1995    Date of Visit:  3/3/2020    Chief Complaint   Patient presents with    Spasms     back for a month    Neck Pain       HPI  Pt c/o back spasms for 1 month. Pt states his back feels tight in general in his upper back, mid back, and lower back. Pt takes Diclofenac. Pt does back stretches which helps. Pt states cardio also helps. Pt c/o neck pain since Friday. Pt states he feels a knot in the back of his neck. Neck pain is sharp and constant. Pt states the pain intensity increases and decreases. Pt states he wakes up sometimes in a weird position and it bothers his neck. Pt's blood pressure is high today. Pt states he drank coffee 30 minutes before the visit and patient takes Vyvanse for ADD. Review of Systems   Constitutional: Negative for activity change, chills, fatigue and fever. HENT: Negative for congestion, postnasal drip, rhinorrhea and sore throat. Eyes: Negative for visual disturbance. Respiratory: Negative for cough, chest tightness, shortness of breath and wheezing. Cardiovascular: Negative for chest pain, palpitations and leg swelling. Gastrointestinal: Negative for abdominal pain, constipation, diarrhea, nausea and vomiting. Genitourinary: Negative for dysuria, frequency and hematuria. Musculoskeletal: Positive for back pain and neck pain. Negative for arthralgias, gait problem and myalgias. Neurological: Negative for dizziness, syncope, weakness, light-headedness, numbness and headaches. Psychiatric/Behavioral: Negative for dysphoric mood and sleep disturbance. The patient is not nervous/anxious.         No Known Allergies  Outpatient Medications Marked as Taking for the 3/3/20 encounter (Office Visit) with Lazaro Muhammad MD   Medication Sig Dispense Refill    diclofenac (VOLTAREN) 75 MG EC tablet Take 1 tablet by mouth 2 times daily 60 tablet 1    Lisdexamfetamine Dimesylate (VYVANSE) 60 MG CAPS Take 1 capsule by mouth Neurological:      Gait: Gait normal.      Deep Tendon Reflexes: Reflexes are normal and symmetric. Results for POC orders placed in visit on 03/03/20   POCT Urinalysis no Micro   Result Value Ref Range    Color, UA Tejal     Clarity, UA Clear     Glucose, UA POC N     Bilirubin, UA N     Ketones, UA N     Spec Grav, UA 1.025     Blood, UA POC N     pH, UA 7.0     Protein, UA POC N     Urobilinogen, UA 0.2     Leukocytes, UA N     Nitrite, UA N      Lab Review   Office Visit on 02/06/2020   Component Date Value    Hemoglobin A1C 02/06/2020 4.8          Assessment/Plan     1. Back muscle spasm  - POCT Urinalysis no Micro  - cyclobenzaprine (FLEXERIL) 5 MG tablet; Take 1 tablet by mouth 3 times daily as needed for Muscle spasms  Dispense: 30 tablet; Refill: 0  - back stretches    2. Neck pain  - cyclobenzaprine (FLEXERIL) 5 MG tablet; Take 1 tablet by mouth 3 times daily as needed for Muscle spasms  Dispense: 30 tablet; Refill: 0  -continue Diclofenac 75mg 2 times daily  -neck exercises    3. Elevated blood pressure reading  -blood pressure is high today  - patient drank coffee 30 minutes prior to visit  - patient takes Vyvanse for ADD    Discussed medications with patient, who voiced understanding of their use and indications. All questions answered. Return in about 2 weeks (around 3/17/2020) for back spasm, neck pain, and blood pressure.

## 2020-03-05 ASSESSMENT — ENCOUNTER SYMPTOMS
COUGH: 0
SHORTNESS OF BREATH: 0
BACK PAIN: 1
DIARRHEA: 0
WHEEZING: 0
CHEST TIGHTNESS: 0
RHINORRHEA: 0
NAUSEA: 0
SORE THROAT: 0
CONSTIPATION: 0
ABDOMINAL PAIN: 0
VOMITING: 0

## 2020-03-11 ENCOUNTER — HOSPITAL ENCOUNTER (OUTPATIENT)
Dept: OCCUPATIONAL THERAPY | Age: 25
Setting detail: THERAPIES SERIES
Discharge: HOME | End: 2020-03-11
Payer: COMMERCIAL

## 2020-03-17 ENCOUNTER — HOSPITAL ENCOUNTER (OUTPATIENT)
Dept: OCCUPATIONAL THERAPY | Age: 25
Setting detail: THERAPIES SERIES
Discharge: HOME | End: 2020-03-17
Payer: COMMERCIAL

## 2020-03-17 PROCEDURE — 97140 MANUAL THERAPY 1/> REGIONS: CPT | Performed by: OCCUPATIONAL THERAPIST

## 2020-03-17 PROCEDURE — 97110 THERAPEUTIC EXERCISES: CPT | Performed by: OCCUPATIONAL THERAPIST

## 2020-03-17 PROCEDURE — 97022 WHIRLPOOL THERAPY: CPT | Performed by: OCCUPATIONAL THERAPIST

## 2020-03-17 PROCEDURE — 97112 NEUROMUSCULAR REEDUCATION: CPT | Performed by: OCCUPATIONAL THERAPIST

## 2020-03-17 NOTE — FLOWSHEET NOTE
duration    OBJECTIVE:       Date:  3/3/2020 3/17/20    Objective Measures/Tests:      ROM: See eval     Wrist flex             R 75  L 72                Strength:  II  R 80  L 78          Observations:        Other:                  MODALITIES:      Fluidotherapy (74272) 10' B (20' total) - instructed on gentle wrist, hand AROM during treatment 10' R, 10' L as prior    Estim (09031/84253)      Paraffin (00390)      US (71262)      Iontophoresis (46625)      Hot Pack      Cold Pack            INTERVENTIONS:      Therapeutic Exercise (12443)      Composite stretching Instructed on modified technique (shoulders/arms down) for wrist ext/flexor stretches 10x Power Web    10x PROM    Proximal stabilization Prone HAB, supermans x 10 each TBand exercises for proximal stabilization strengthening: 10x each shoulder ext, triceps press, rowing (blue bands)    Air Dyne bike - 5'      Corner Stretch 10x 10x    Shoulder circles 5x3    Instructed on 1-2x performance of circles prior to starting workout exercises (to facilitate scapular setting) 10x3    Exercise Retraining TBand column - rowing, triceps press, shoulder ext x 10 each (cueing as below)    Cable column - lat pull down, bicep curls (bar and rope attachment), triceps press (bar and rope) x 10 each (cueing as below)                 Therapeutic Activity (64248)                              Manual Therapy (52231)  8' STM, DTM B FA                Neuromuscular Reeducation (55408) Cueing for postural corrections (proximal stabilization - shoulders \"down and back\"/scapular setting, neutral wrists, submax , avoidance of cocontractions) cuieng as prior                ADL Training (96171) Instructed on diagnosis specific anatomy, joint protection, and ADL modifications - handout provided                 HEP Training/Review See sheet(s)                 Splinting      Lcode:      Orthotic Mgmt, Subsequent Enc (95956)      Orthotic Mgmt & Training (61510)            Other: Progressing: [] Met: [] Not Met: [] Adjusted   3) Pt will verbalize understanding and demonstrate competency with diagnosis specific ADL modifications and joint protection techniques to enable independence with ADLs, household, and recreational activities. [] Progressing: [] Met: [] Not Met: [] Adjusted   4) Pt will have a decrease in pain to 1-2/10 to facilitate return to recreational (working out), work (keyboarding), and household (opening jars) tasks. [] Progressing: [] Met: [] Not Met: [] Adjusted        Overall Progression Towards Functional Goals/Treatment Progress Update:  [x] Patient is progressing as expected towards functional goals listed. [] Progression is slowed due to complexities/impairments listed. [] Progression has been slowed due to co-morbidities. [] Plan just implemented, too soon to assess goals progression <30 days  [] Goals require adjustment due to lack of progress  [] Patient is not progressing as expected and requires additional follow up with physician  [] All goals are met  [] Other:     Prognosis for POC: [x] Good [] Fair  [] Poor    Patient requires continued skilled intervention: [x] Yes  [] No    Treatment/Activity Tolerance:  [x] Patient able to complete treatment  [] Patient limited by fatigue  [] Patient limited by pain    [] Patient limited by other medical complications  [] Other:                  PLAN:  [x] Continue per plan of care [] Alter current plan (see comments above)  [] Plan of care initiated [] Hold pending MD visit [] Discharge      Electronically signed by: Susy HARDIN/L, PT, MPT, CHT, [id number], [id number]      Note: If patient does not return for scheduled/ recommended follow up visits, this note will serve as a discharge from care along with most recent update on progress.

## 2020-03-18 ENCOUNTER — OFFICE VISIT (OUTPATIENT)
Dept: PRIMARY CARE CLINIC | Age: 25
End: 2020-03-18
Payer: COMMERCIAL

## 2020-03-18 VITALS
OXYGEN SATURATION: 99 % | WEIGHT: 166 LBS | DIASTOLIC BLOOD PRESSURE: 80 MMHG | HEIGHT: 66 IN | BODY MASS INDEX: 26.68 KG/M2 | HEART RATE: 81 BPM | SYSTOLIC BLOOD PRESSURE: 120 MMHG

## 2020-03-18 PROCEDURE — 99213 OFFICE O/P EST LOW 20 MIN: CPT | Performed by: INTERNAL MEDICINE

## 2020-03-18 RX ORDER — CYCLOBENZAPRINE HCL 5 MG
5 TABLET ORAL 3 TIMES DAILY PRN
Qty: 90 TABLET | Refills: 1 | Status: SHIPPED | OUTPATIENT
Start: 2020-03-18 | End: 2020-09-09

## 2020-03-18 ASSESSMENT — ENCOUNTER SYMPTOMS
CONSTIPATION: 0
RHINORRHEA: 0
COUGH: 0
WHEEZING: 0
CHEST TIGHTNESS: 0
NAUSEA: 0
SHORTNESS OF BREATH: 0
DIARRHEA: 0
VOMITING: 0
ABDOMINAL PAIN: 0
BACK PAIN: 1
SORE THROAT: 0

## 2020-03-18 NOTE — PATIENT INSTRUCTIONS
1. Back muscle spasm  -better  -Flexeril 5mg nightly as needed  -back stretches  -Referral to [facility]     2.  Neck pain  -better  -continue Diclofenac 75mg 2 times daily  -Continue neck exercises  -Referral to [facility]

## 2020-03-18 NOTE — PROGRESS NOTES
Gonzalez Morales   Date ofBirth:  1995    Date of Visit:  3/18/2020    Chief Complaint   Patient presents with    Spasms     Back    Neck Pain    Blood Pressure Check       HPI  Back spasm- Pt takes Flexeril and states it seems to help. Pt states he took it 3 times daily and now is taking it at bedtime. Pt states he doesn't feel the back spasms as much as he did. Pt does back exercises. Neck pain- Pt takes Diclofenac 75mg po bid. Pt states his neck feels a lot better but he still has neck pain. Neck pain is no longer sharp and constant. Pt feels tightness. Pt states he is not doing the neck exercises as much as he was. Pt's blood pressure was high last visit. Pt had drank coffee 30 minutes prior to last visit. Pt also takes stimulant Vyvanse. Pt's blood pressure is normal today. Review of Systems   Constitutional: Negative for activity change, chills, fatigue and fever. HENT: Negative for congestion, postnasal drip, rhinorrhea and sore throat. Eyes: Negative for visual disturbance. Respiratory: Negative for cough, chest tightness, shortness of breath and wheezing. Cardiovascular: Negative for chest pain, palpitations and leg swelling. Gastrointestinal: Negative for abdominal pain, constipation, diarrhea, nausea and vomiting. Genitourinary: Negative for dysuria, frequency and hematuria. Musculoskeletal: Positive for back pain and neck pain. Negative for arthralgias, gait problem and myalgias. Neurological: Negative for dizziness, syncope, weakness, light-headedness, numbness and headaches. Psychiatric/Behavioral: Negative for dysphoric mood and sleep disturbance. The patient is not nervous/anxious.         No Known Allergies  Outpatient Medications Marked as Taking for the 3/18/20 encounter (Office Visit) with Evelyn Strickland MD   Medication Sig Dispense Refill    cyclobenzaprine (FLEXERIL) 5 MG tablet Take 1 tablet by mouth 3 times daily as needed for Muscle spasms 30 tablet 0    diclofenac (VOLTAREN) 75 MG EC tablet Take 1 tablet by mouth 2 times daily 60 tablet 1    Lisdexamfetamine Dimesylate (VYVANSE) 60 MG CAPS Take 1 capsule by mouth daily .  buPROPion (WELLBUTRIN SR) 200 MG extended release tablet Take 200 mg by mouth           Vitals:    03/18/20 0821   BP: 120/80   Site: Left Upper Arm   Position: Sitting   Cuff Size: Medium Adult   Pulse: 81   SpO2: 99%   Weight: 166 lb (75.3 kg)   Height: 5' 6\" (1.676 m)     Body mass index is 26.79 kg/m². Physical Exam  Constitutional:       General: He is not in acute distress. Appearance: He is well-developed. HENT:      Mouth/Throat:      Pharynx: Oropharynx is clear. Eyes:      General: Lids are normal.      Extraocular Movements: Extraocular movements intact. Pupils: Pupils are equal, round, and reactive to light. Neck:      Musculoskeletal: Neck supple. Thyroid: No thyromegaly. Vascular: No carotid bruit. Cardiovascular:      Rate and Rhythm: Normal rate and regular rhythm. Heart sounds: Normal heart sounds, S1 normal and S2 normal. No murmur. No friction rub. No gallop. Pulmonary:      Effort: Pulmonary effort is normal. No respiratory distress. Breath sounds: Normal breath sounds. No wheezing. Musculoskeletal:      Cervical back: He exhibits tenderness. He exhibits normal range of motion and no spasm. Thoracic back: He exhibits tenderness. He exhibits normal range of motion and no spasm. No results found for this visit on 03/18/20. Lab Review         Assessment/Plan     1. Back muscle spasm  -better  -Continue cyclobenzaprine (FLEXERIL) 5 MG tablet; Take 1 tablet by mouth 3 times daily as needed for Muscle spasms  Dispense: 90 tablet; Refill: 1  -back stretches  -Referral to [facility]     2. Neck pain  -better  -continue Diclofenac 75mg 2 times daily  -Continue cyclobenzaprine (FLEXERIL) 5 MG tablet;  Take 1 tablet by mouth 3 times daily as needed for Muscle spasms  Dispense: 90 tablet; Refill: 1  -Continue neck exercises  -Referral to [facility]           Discussed medications with patient, who voiced understanding of their use and indications. All questions answered.       Return in about 6 months (around 9/18/2020) for annual physical exam.

## 2020-03-31 ENCOUNTER — APPOINTMENT (OUTPATIENT)
Dept: OCCUPATIONAL THERAPY | Age: 25
End: 2020-03-31
Payer: COMMERCIAL

## 2020-04-06 RX ORDER — DICLOFENAC SODIUM 75 MG/1
75 TABLET, DELAYED RELEASE ORAL 2 TIMES DAILY
Qty: 60 TABLET | Refills: 3 | Status: SHIPPED | OUTPATIENT
Start: 2020-04-06 | End: 2020-11-18

## 2020-04-06 NOTE — TELEPHONE ENCOUNTER
Medication:   Requested Prescriptions     Pending Prescriptions Disp Refills    diclofenac (VOLTAREN) 75 MG EC tablet 60 tablet 1     Sig: Take 1 tablet by mouth 2 times daily     Last Filled:  2/15/2020    Last appt: 3/18/2020   Next appt: 9/18/2020    Last OARRS: No flowsheet data found.

## 2020-05-19 ENCOUNTER — VIRTUAL VISIT (OUTPATIENT)
Dept: PRIMARY CARE CLINIC | Age: 25
End: 2020-05-19
Payer: COMMERCIAL

## 2020-05-19 PROCEDURE — 99213 OFFICE O/P EST LOW 20 MIN: CPT | Performed by: INTERNAL MEDICINE

## 2020-05-19 NOTE — PROGRESS NOTES
2020    TELEHEALTH EVALUATION -- Audio/Visual (During VKFIB-32 public health emergency)    Chief Complaint   Patient presents with    Joint Pain     wrist pain since 1 week and ankle pain       HPI:    Sofie Cowan (:  1995) has requested an audio/video evaluation for the following concern(s):    Pt c/o joint pain bilateral wrists. Patient has had wrist pain for a few months. Wrist pain is better but hasn't gone away. Wrist pain is can be sharp but is mostly dull achy. Pt denies swelling. Pt states sometimes with reaching he may feel sharp pain if extends arm to grab with fingers and . Pt states typing and any wrist movement makes pain worse. Pt states wearing a wrist support made his pain worse. Pt states he takes Diclofenac 1-2 times per day and it helps. Pt did see Xuan Finley on 20    Pt states he hurt his right achilles tendon running last week on 20. Pt states he went to go step off his right foot and felt pain in tendon and has had pain every since with any pressure on his foot. Pt denies swelling. Review of Systems   Constitutional: Negative for chills and fever. Musculoskeletal: Positive for arthralgias. Negative for gait problem and joint swelling. Skin: Negative for color change and rash. Neurological: Negative for weakness and numbness. Prior to Visit Medications    Medication Sig Taking? Authorizing Provider   diclofenac (VOLTAREN) 75 MG EC tablet Take 1 tablet by mouth 2 times daily Yes Tanna Baltazar MD   cyclobenzaprine (FLEXERIL) 5 MG tablet Take 1 tablet by mouth 3 times daily as needed for Muscle spasms Yes Tanna Baltazar MD   Lisdexamfetamine Dimesylate (VYVANSE) 60 MG CAPS Take 1 capsule by mouth daily .  Yes Historical Provider, MD   buPROPion (WELLBUTRIN SR) 200 MG extended release tablet Take 200 mg by mouth  Historical Provider, MD       Social History     Tobacco Use    Smoking status: Never Smoker    Smokeless tobacco: Never Used   Substance Use Topics    Alcohol use: Yes     Alcohol/week: 4.0 standard drinks     Types: 4 Cans of beer per week     Comment: Social    Drug use: Not on file        No Known Allergies    PHYSICAL EXAMINATION:  [ INSTRUCTIONS:  \"[x]\" Indicates a positive item  \"[]\" Indicates a negative item  -- DELETE ALL ITEMS NOT EXAMINED]  Vital Signs: (As obtained by patient/caregiver or practitioner observation)    Blood pressure-  Heart rate-    Respiratory rate-    Temperature-  Pulse oximetry-     Constitutional: [x] Appears well-developed and well-nourished [x] No apparent distress      [] Abnormal-   Mental status  [x] Alert and awake  [x] Oriented to person/place/time [x]Able to follow commands      Eyes:  EOM    [x]  Normal  [] Abnormal-  Sclera  [x]  Normal  [] Abnormal -         Discharge [x]  None visible  [] Abnormal -    HENT:   [x] Normocephalic, atraumatic. [] Abnormal   [x] Mouth/Throat: Mucous membranes are moist.     External Ears [x] Normal  [] Abnormal-     Neck: [x] No visualized mass     Pulmonary/Chest: [x] Respiratory effort normal.  [x] No visualized signs of difficulty breathing or respiratory distress        [] Abnormal-      Musculoskeletal:   [] Normal gait with no signs of ataxia         [] Normal range of motion of neck        [] Abnormal-       Neurological:        [] No Facial Asymmetry (Cranial nerve 7 motor function) (limited exam to video visit)          [] No gaze palsy        [] Abnormal-         Skin:        [] No significant exanthematous lesions or discoloration noted on facial skin         [] Abnormal-            Psychiatric:       [x] Normal Affect [] No Hallucinations        [] Abnormal-     Other pertinent observable physical exam findings-  Patient reports wrist pain with wrist range of motion    ASSESSMENT/PLAN:  1.  Pain in both wrists  -Increase Diclofenac 75 to 2 times daily  -Have wrist xrays done previously ordered  -Follow up with [facility]    2. Achilles tendon pain  -Increase Diclofenac 75 to 2 times daily  -Referral to [address], DPM, Podiatry, [facility] - [facility]      Return if symptoms worsen or fail to improve. Diomedes Luis is a 25 y.o. male being evaluated by a Virtual Visit (video visit) encounter to address concerns as mentioned above. A caregiver was present when appropriate. Due to this being a TeleHealth encounter (During OAGLV-02 public health emergency), evaluation of the following organ systems was limited: Vitals/Constitutional/EENT/Resp/CV/GI//MS/Neuro/Skin/Heme-Lymph-Imm. Pursuant to the emergency declaration under the [address] [place] [address], [address] [place] [address] authority and the [employer] Act, this Virtual Visit was conducted with patient's (and/or legal guardian's) consent, to reduce the patient's risk of exposure to COVID-19 and provide necessary medical care. The patient (and/or legal guardian) has also been advised to contact this office for worsening conditions or problems, and seek emergency medical treatment and/or call 911 if deemed necessary. Patient identification was verified at the start of the visit: Yes    Total time spent on this encounter: Not billed by time    Services were provided through a video synchronous discussion virtually to substitute for in-person clinic visit. Patient and provider were located at their individual homes. --Kenny Villalpando MD on 5/26/2020 at 1:32 PM    An electronic signature was used to authenticate this note.

## 2020-05-26 PROBLEM — M76.60 ACHILLES TENDON PAIN: Status: ACTIVE | Noted: 2020-05-26

## 2020-05-26 ASSESSMENT — ENCOUNTER SYMPTOMS: COLOR CHANGE: 0

## 2020-05-26 NOTE — PATIENT INSTRUCTIONS
1. Pain in both wrists  -Increase Diclofenac 75 to 2 times daily  -Have wrist xrays done previously ordered  -Follow up with [facility]    2.  Achilles tendon pain  -Increase Diclofenac 75 to 2 times daily  -Referral to [facility], Jeromy Mora DPM, Podiatry, [place]

## 2020-08-10 ENCOUNTER — OFFICE VISIT (OUTPATIENT)
Dept: PRIMARY CARE CLINIC | Age: 25
End: 2020-08-10
Payer: COMMERCIAL

## 2020-08-10 VITALS
DIASTOLIC BLOOD PRESSURE: 76 MMHG | TEMPERATURE: 97.6 F | RESPIRATION RATE: 16 BRPM | SYSTOLIC BLOOD PRESSURE: 118 MMHG | WEIGHT: 157 LBS | OXYGEN SATURATION: 98 % | BODY MASS INDEX: 25.23 KG/M2 | HEART RATE: 86 BPM | HEIGHT: 66 IN

## 2020-08-10 PROCEDURE — 99213 OFFICE O/P EST LOW 20 MIN: CPT | Performed by: NURSE PRACTITIONER

## 2020-08-10 SDOH — ECONOMIC STABILITY: FOOD INSECURITY: WITHIN THE PAST 12 MONTHS, THE FOOD YOU BOUGHT JUST DIDN'T LAST AND YOU DIDN'T HAVE MONEY TO GET MORE.: NEVER TRUE

## 2020-08-10 SDOH — ECONOMIC STABILITY: TRANSPORTATION INSECURITY
IN THE PAST 12 MONTHS, HAS THE LACK OF TRANSPORTATION KEPT YOU FROM MEDICAL APPOINTMENTS OR FROM GETTING MEDICATIONS?: NO

## 2020-08-10 SDOH — ECONOMIC STABILITY: FOOD INSECURITY: WITHIN THE PAST 12 MONTHS, YOU WORRIED THAT YOUR FOOD WOULD RUN OUT BEFORE YOU GOT MONEY TO BUY MORE.: NEVER TRUE

## 2020-08-10 SDOH — ECONOMIC STABILITY: INCOME INSECURITY: HOW HARD IS IT FOR YOU TO PAY FOR THE VERY BASICS LIKE FOOD, HOUSING, MEDICAL CARE, AND HEATING?: NOT HARD AT ALL

## 2020-08-10 SDOH — ECONOMIC STABILITY: TRANSPORTATION INSECURITY
IN THE PAST 12 MONTHS, HAS LACK OF TRANSPORTATION KEPT YOU FROM MEETINGS, WORK, OR FROM GETTING THINGS NEEDED FOR DAILY LIVING?: NO

## 2020-08-10 ASSESSMENT — PATIENT HEALTH QUESTIONNAIRE - PHQ9
2. FEELING DOWN, DEPRESSED OR HOPELESS: 0
1. LITTLE INTEREST OR PLEASURE IN DOING THINGS: 0
SUM OF ALL RESPONSES TO PHQ9 QUESTIONS 1 & 2: 0
SUM OF ALL RESPONSES TO PHQ QUESTIONS 1-9: 0
SUM OF ALL RESPONSES TO PHQ QUESTIONS 1-9: 0

## 2020-08-10 ASSESSMENT — ENCOUNTER SYMPTOMS
BACK PAIN: 0
SHORTNESS OF BREATH: 0
COUGH: 0
COLOR CHANGE: 0
CHEST TIGHTNESS: 0
WHEEZING: 0

## 2020-08-10 NOTE — PROGRESS NOTES
ENCOUNTER DATE: 8/10/2020     NAME: Meme Biggs   AGE: 22 y.o. GENDER: male   YOB: 1995    Patient Active Problem List   Diagnosis    Right shoulder pain    Attention deficit hyperactivity disorder (ADHD), combined type    Constipation    Depression    Pain in both wrists    Extensor tendinitis of hand    Achilles tendon pain      No Known Allergies  Current Outpatient Medications on File Prior to Visit   Medication Sig Dispense Refill    diclofenac (VOLTAREN) 75 MG EC tablet Take 1 tablet by mouth 2 times daily 60 tablet 3    cyclobenzaprine (FLEXERIL) 5 MG tablet Take 1 tablet by mouth 3 times daily as needed for Muscle spasms 90 tablet 1    Lisdexamfetamine Dimesylate (VYVANSE) 60 MG CAPS Take 1 capsule by mouth daily . No current facility-administered medications on file prior to visit. Social History     Tobacco Use    Smoking status: Never Smoker    Smokeless tobacco: Never Used   Substance Use Topics    Alcohol use: Yes     Alcohol/week: 4.0 standard drinks     Types: 4 Cans of beer per week     Comment: Social      CARE TEAM  Patient Care Team:  Roxanne Pennington MD as PCP - General (Internal Medicine)  Roxanne Pennington MD as PCP - [address] [place] [address] [facility]  Sherrie Oakley as Consulting Physician (Pediatrics)    Chief Complaint   Patient presents with    Foot Pain     right  heel pain   injured  it  while running      HPI:   Pt is here for a problem visit. Complains of right heel pain  States it started in June when he was running. Had a pain in his heel while running. Didn't notice any swelling or bruising. Pain intially improved, now worsening. States he has been walking more and tried running again, but was too painful. Pain is in heel and goes into achilles and calf. Has some numbness in foot. Walking worsens the pain. Or putting pressure. Has taken oral diclofenac, heat/ice, rest. Wearing brace.      ROS:  Review of Systems Constitutional: Negative for activity change, chills, fatigue and fever. Respiratory: Negative for cough, chest tightness, shortness of breath and wheezing. Cardiovascular: Negative for chest pain, palpitations and leg swelling. Musculoskeletal: Positive for arthralgias, gait problem and myalgias. Negative for back pain and joint swelling. Skin: Negative for color change, pallor and rash. Neurological: Negative for weakness, numbness and headaches. Hematological: Negative for adenopathy. VITALS:  /76   Pulse 86   Temp 97.6 °F (36.4 °C) (Oral)   Resp 16   Ht 5' 6\" (1.676 m)   Wt 157 lb (71.2 kg)   SpO2 98%   BMI 25.34 kg/m²      PE:  Physical Exam  Vitals signs and nursing note reviewed. Constitutional:       General: He is not in acute distress. Appearance: Normal appearance. He is well-developed and normal weight. He is not diaphoretic. HENT:      Head: Normocephalic and atraumatic. Neck:      Musculoskeletal: Normal range of motion. Cardiovascular:      Rate and Rhythm: Normal rate and regular rhythm. Pulses:           Dorsalis pedis pulses are 2+ on the right side. Posterior tibial pulses are 2+ on the right side. Heart sounds: Normal heart sounds. No murmur. No friction rub. Pulmonary:      Effort: Pulmonary effort is normal. No respiratory distress. Breath sounds: Normal breath sounds. No wheezing, rhonchi or rales. Musculoskeletal:         General: No deformity. Right foot: Normal range of motion and normal capillary refill. Tenderness (tenderness along achilles) present. No bony tenderness, swelling, crepitus, deformity or foot drop. Feet:      Right foot:      Skin integrity: Skin integrity normal.      Toenail Condition: Right toenails are normal.   Skin:     General: Skin is warm and dry. Coloration: Skin is not pale. Findings: No erythema or rash. Neurological:      General: No focal deficit present.       Mental Status: He is alert and oriented to person, place, and time. Motor: No weakness. Gait: Gait abnormal (limping). Psychiatric:         Mood and Affect: Mood normal.         Behavior: Behavior normal.        ASSESSMENT/PLAN:  1. Achilles tendon pain  Trial of topical NSAID. Refer to PT. Home stretching info given to pt. Contact info given. Will call for PT apt. If no improvement, may need to consider MRI and/or ortho referral. Pt agrees with plan of care. - diclofenac sodium (VOLTAREN) 1 % GEL; Apply 2 g topically 4 times daily  Dispense: 1 Tube; Refill: 5  - [facility]      Return if symptoms worsen or fail to improve.      Electronically signed by ISABELL Pollack CNP on 8/10/2020 at 12:13 PM

## 2020-08-10 NOTE — PATIENT INSTRUCTIONS
Patient Education        Achilles Tendon: Exercises  Introduction  Here are some examples of exercises for you to try. The exercises may be suggested for a condition or for rehabilitation. Start each exercise slowly. Ease off the exercises if you start to have pain. You will be told when to start these exercises and which ones will work best for you. Toe stretch  Toe stretch   1. Sit in a chair, and extend your affected leg so that your heel is on the floor. 2. With your hand, reach down and pull your big toe up and back. Pull toward your ankle and away from the floor. 3. Hold the position for at least 15 to 30 seconds. 4. Repeat 2 to 4 times a session, several times a day. Calf-plantar fascia stretch   1. Sit with your legs extended and knees straight. 2. Place a towel around your foot just under the toes. 3. Hold each end of the towel in each hand, with your hands above your knees. 4. Pull back with the towel so that your foot stretches toward you. 5. Hold the position for at least 15 to 30 seconds. 6. Repeat 2 to 4 times a session, up to 5 sessions a day. Floor stretch   1. Stand about 2 feet from a wall, and place your hands on the wall at about shoulder height. Or you can stand behind a chair, placing your hands on the back of it for balance. 2. Step back with the leg you want to stretch. Keep the leg straight, and press your heel into the floor with your toe turned slightly in.  3. Lean forward, and bend your other leg slightly. Feel the stretch in the Achilles tendon of your back leg. Hold for at least 15 to 30 seconds. 4. Repeat 2 to 4 times a session, up to 5 sessions a day. Stair stretch   1. Stand with the balls of both feet on the edge of a step or curb (or a medium-sized phone book). With at least one hand, hold onto something solid for balance, such as a banister or handrail.   2. Keeping your affected leg straight, slowly let that heel hang down off of the step or curb until you feel a stretch in the back of your calf and/or Achilles area. Some of your weight should still be on the other leg. 3. Hold this position for at least 15 to 30 seconds. 4. Repeat 2 to 4 times a session, up to 5 times a day or whenever your Achilles tendon starts to feel tight. This stretch can also be done with your knee slightly bent. Strength exercise   1. This exercise will get you started on building strength after an Achilles tendon injury. Your doctor or physical therapist can help you move on to more challenging exercises as you heal and get stronger. 2. Stand on a step with your heel off the edge of the step. Hold on to a handrail or wall for balance. 3. Push up on your toes, then slowly count to 10 as you lower yourself back down until your heel is below the step. If it hurts to push up on your toes, try putting most of your weight on your other foot as you push up, or try using your arms to help you. If you can't do this exercise without causing pain, stop the exercise and talk to your doctor. 4. Repeat the exercise 8 to 12 times, half with the knee straight and half with the knee bent. Follow-up care is a key part of your treatment and safety. Be sure to make and go to all appointments, and call your doctor if you are having problems. It's also a good idea to know your test results and keep a list of the medicines you take. Where can you learn more? Go to https://[employer].Enxue.com. org and sign in to your [employer] account. Enter F330 in the TV [employer] box to learn more about \"Achilles Tendon: Exercises. \"     If you do not have an account, please click on the \"Sign Up Now\" link. Current as of: March 2, 2020               Content Version: 12.5  © 0436-1426 [employer], [employer]. Care instructions adapted under license by [facility] ([facility]).  If you have questions about a medical condition or this instruction, always ask your healthcare professional. [employer] disclaims any warranty or liability for your use of this information. TRIAL OF DICLOFENAC GEL FOR PAIN RELIEF  REFER TO PT. CALL TO SCHEDULE APT. IF NO IMPROVEMENT, MAY NEED MRI AND ORTHO REFERRAL.

## 2020-09-08 ENCOUNTER — HOSPITAL ENCOUNTER (OUTPATIENT)
Dept: PHYSICAL THERAPY | Age: 25
Setting detail: THERAPIES SERIES
Discharge: HOME | End: 2020-09-08
Payer: COMMERCIAL

## 2020-09-08 PROCEDURE — 97140 MANUAL THERAPY 1/> REGIONS: CPT

## 2020-09-08 PROCEDURE — 97110 THERAPEUTIC EXERCISES: CPT

## 2020-09-08 PROCEDURE — 97161 PT EVAL LOW COMPLEX 20 MIN: CPT

## 2020-09-08 NOTE — FLOWSHEET NOTE
[facility], [address] [facility] [address]    [place], [place]       Physical Therapy Daily Treatment Note  Date:  2020    Patient Name:  Farida Barrientos    :  1995  MRN: [id number]  Restrictions/Precautions:    Medical/Treatment Diagnosis Information:  · Diagnosis: M76.60 (ICD-10-CM) - Achilles tendon pain  · Treatment Diagnosis: M25.571 Pain in Right Ankle  Insurance/Certification information:  PT Insurance Information: [employer]  Physician Information:  Referring Practitioner: Abundio Sinclair MD  Has the plan of care been signed (Y/N):        []  Yes  [x]  No     Date of Patient follow up with Physician:       Is this a Progress Report:     []  Yes  [x]  No        If Yes:  Date Range for reporting period:  Beginning  Ending    Progress report will be due (10 Rx or 30 days whichever is less):        Recertification will be due (POC Duration  / 90 days whichever is less):          Visit # Insurance Allowable Auth Required   1   60 []  Yes [x]  No        Functional Scale: LEFS: 47.5% deficit   Date assessed: 20       Latex Allergy:  [x]NO      []YES  Preferred Language for Healthcare:   [x]English       []other:      Pain level:  0-5/10     SUBJECTIVE:  See eval        OBJECTIVE:   ROM   PROM AROM Comments    Left Right Left Right    Dorsiflexion   0 Lacking 5    Plantarflexion   45 25    Inversion   45 25    Eversion   1 1                      Strength   Left Right Comments   Dorsiflexion 5 5    Plantarflexion 5 4+    Inversion 5 5    Eversion 5 4+        Reflexes/Sensation:    [x]Dermatomes/Myotomes intact    [x]Reflexes equal and normal bilaterally   []Other:    Joint mobility:    [x]Normal    []Hypo   []Hyper    Palpation: TTP along R achilles tendon; mild edema present      Functional Mobility/Transfers: Independent with ADL's and self care; unable to walk and run; unable to work out; pain and difficulty with stairs and squatting   9/8    Posture: WNL  9/8    Gait: (include devices/WB status) Antalgic gait present with walking boot  9/8                       [x] Patient history, allergies, meds reviewed. Medical chart reviewed. See intake form. 9/8    Review Of Systems (ROS):   [x]Performed Review of systems (Integumentary, CardioPulmonary, Neurological) by intake and observation. Intake form has been scanned into medical record. Patient has been instructed to contact their primary care physician regarding ROS issues if not already being addressed at this time.   9/8      RESTRICTIONS/PRECAUTIONS: None    Exercises/Interventions:     Exercise/Equipment Resistance/Repetitions Other comments   ROM     ABC'S     BAPS     Bottle Roll     Inversion/Eversion     Ankle Pumps 30x    Toe Curls     Rocks     Towels          Stretching     Circles 30x CW and CCW    Toe Extension      Towel Pull 1 30 sec x 5    Towel Pull 2     ERMI     Incline Stretch     Pro-Stretch     Hamstring     Stair Stretch     Calf 1 (standing gastroc) 30 sec x 5    Calf 2 (standing soleus) 30 sec x 5         Isometrics     Dorsiflexion     Plantarflexion     Inversion     Eversion          PRE's     Dorsiflexion     Plantarflexion     Inversion     Eversion     Heel walk     Toe walk     SLR     Calf Raises     Step Up     Knee Extension     Hamstring Curls     Leg Press          Balance:     Rocker Board     BOSU     SLS     Aeromat     Foam Roll     Plyoback     Tandem Stance     Biodex          Bike     Treadmill          Manual interventions     IASTM posterior R calf and achilles tendon  15 min Start  9/8       Therapeutic Exercise and NMR EXR  [x] (64940) Provided verbal/tactile cueing for activities related to strengthening, flexibility, endurance, ROM for improvements in LE, proximal hip, and core control with self care, mobility, lifting, ambulation.  [] (38004) Provided verbal/tactile cueing for activities related to improving balance, coordination, kinesthetic sense, posture, motor skill, proprioception  to assist with LE, proximal hip, and core control in self care, mobility, lifting, ambulation and eccentric single leg control. NMR and Therapeutic Activities:    [] (49721 or 18975) Provided verbal/tactile cueing for activities related to improving balance, coordination, kinesthetic sense, posture, motor skill, proprioception and motor activation to allow for proper function of core, proximal hip and LE with self care and ADLs  [] (81173) Gait Re-education- Provided training and instruction to the patient for proper LE, core and proximal hip recruitment and positioning and eccentric body weight control with ambulation re-education including up and down stairs     Home Exercise Program:    [x] (52866) Reviewed/Progressed HEP activities related to strengthening, flexibility, endurance, ROM of core, proximal hip and LE for functional self-care, mobility, lifting and ambulation/stair navigation   [] (19101)Reviewed/Progressed HEP activities related to improving balance, coordination, kinesthetic sense, posture, motor skill, proprioception of core, proximal hip and LE for self care, mobility, lifting, and ambulation/stair navigation      Manual Treatments:  PROM / STM / Oscillations-Mobs:  G-I, II, III, IV (PA's, Inf., Post.)  [] (62748) Provided manual therapy to mobilize LE, proximal hip and/or LS spine soft tissue/joints for the purpose of modulating pain, promoting relaxation,  increasing ROM, reducing/eliminating soft tissue swelling/inflammation/restriction, improving soft tissue extensibility and allowing for proper ROM for normal function with self care, mobility, lifting and ambulation.      Modalities:      Charges:  Timed Code Treatment Minutes: 30 + EVAL   Total Treatment Minutes: 45     [x] EVAL (LOW) 45464 (typically 20 minutes face-to-face)  [] EVAL (MOD) 39962 (typically 30 minutes face-to-face)  [] EVAL (HIGH) 095 1623 (typically 45 minutes face-to-face)  [] RE-EVAL     [x] JM(09264) x 1     [] IONTO  [] NMR (37316) x     [] VASO  [x] Manual (92772) x 1     [] Other:  [] TA x      [] Mech Traction (53509)  [] ES(attended) (93933)      [] ES (un) (16173):     GOALS:   Patient stated goal:  Reduce pain; return to walking and running  [] Progressing: [] Met: [] Not Met: [] Adjusted    Therapist goals for Patient:   Short Term Goals: To be achieved in: 2 weeks  1. Independent in HEP and progression per patient tolerance, in order to prevent re-injury. [] Progressing: [] Met: [] Not Met: [] Adjusted   2. Patient will have a decrease in pain to facilitate improvement in movement, function, and ADLs as indicated by Functional Deficits. [] Progressing: [] Met: [] Not Met: [] Adjusted    Long Term Goals: To be achieved in: 4 weeks  1. Disability index score of 20% or less for the LEFS to assist with reaching prior level of function. [] Progressing: [] Met: [] Not Met: [] Adjusted  2. Patient will demonstrate increased AROM to WNL to allow for proper joint functioning as indicated by patients Functional Deficits. [] Progressing: [] Met: [] Not Met: [] Adjusted  3. Patient will demonstrate an increase in Strength to good proximal hip strength and control in LE to allow for proper functional mobility as indicated by patients Functional Deficits. [] Progressing: [] Met: [] Not Met: [] Adjusted  4. Patient will return to Independent functional activities without increased symptoms or restriction. [] Progressing: [] Met: [] Not Met: [] Adjusted  5. Patient will report returning to running for 1 mile or longer with no increase in pain. [] Progressing: [] Met: [] Not Met: [] Adjusted     Overall Progression Towards Functional goals/ Treatment Progress Update:  [] Patient is progressing as expected towards functional goals listed. [] Progression is slowed due to complexities/Impairments listed.   [] Progression has been slowed due to co-morbidities. [x] Plan just implemented, too soon to assess goals progression <30days   [] Goals require adjustment due to lack of progress  [] Patient is not progressing as expected and requires additional follow up with physician  [] Other    Prognosis for POC: [x] Good [] Fair  [] Poor      Patient requires continued skilled intervention: [x] Yes  [] No    Treatment/Activity Tolerance:  [x] Patient able to complete treatment  [] Patient limited by fatigue  [] Patient limited by pain     [] Patient limited by other medical complications  [] Other:     Patient Education:                  PLAN: See eval  [] Continue per plan of care [] Alter current plan (see comments above)  [x] Plan of care initiated [] Hold pending MD visit [] Discharge      Electronically signed by:  Cornel Lopez, PT, DPT    Note: If patient does not return for scheduled/ recommended follow up visits, this note will serve as a discharge from care along with most recent update on progress.

## 2020-09-08 NOTE — PLAN OF CARE
The [facility]  [address]  [place], [place]        Physical Therapy Certification    Dear Referring Practitioner: Eliana Dowell MD,    We had the pleasure of evaluating the following patient for physical therapy services at [address] [place] [address]. A summary of our findings can be found in the initial assessment below. This includes our plan of care. If you have any questions or concerns regarding these findings, please do not hesitate to contact me at the office phone number checked above. Thank you for the referral.       Physician Signature:_______________________________Date:__________________  By signing above (or electronic signature), therapists plan is approved by physician      Patient: Iram Holder   : 1995   MRN: [id number]  Referring Physician: Referring Practitioner: Eliana Dowell MD      Evaluation Date: 2020      Medical Diagnosis Information:  Diagnosis: M76.60 (ICD-10-CM) - Achilles tendon pain   Treatment Diagnosis: M25.571 Pain in Right Ankle                                         Insurance information: PT Insurance Information: [employer]      Precautions/ Contra-indications: None  Latex Allergy:  [x]NO      []YES  Preferred Language for Healthcare:   [x]English       []other:    SUBJECTIVE: Patient stated complaint: Patient is a 22year old male who presents to the clinic with reports of right pain in his achilles. Patient states that he has pain in the achilles since . He states that he was running 4-5 miles every other day without great shoes and wasn't stretching well. He states that one of the days he was on his fifth mile and felt immediate sharp pain in his achilles. He states that he continued to try and work out on the leg but the pain has gotten worse.  He states that he saw MD who prescribed diclofenac and encouraged patient to go to PT. He states that he is wearing a boot that helps his pain because he puts less pressure on the tendon. He states that his pain is getting better but he is still restricted in his activity. Relevant Medical History: None  Functional Disability Index: LEFS: 47.5% deficit    Pain Scale: 0-5/10  Easing factors: Resting; icing  Provocative factors: Running; walking; squatting; ascending/descending stairs     Type: []Constant   [x]Intermittent  []Radiating []Localized []other:     Numbness/Tingling: Denies    Occupation/School: [employer]     Living Status/Prior Level of Function: Independent with ADLs and IADLs    OBJECTIVE:   ROM  9/8 PROM AROM Comments    Left Right Left Right    Dorsiflexion   0 Lacking 5    Plantarflexion   45 25    Inversion   45 25    Eversion   1 1                      Strength  9/8 Left Right Comments   Dorsiflexion 5 5    Plantarflexion 5 4+    Inversion 5 5    Eversion 5 4+        Reflexes/Sensation: 9/8   [x]Dermatomes/Myotomes intact    [x]Reflexes equal and normal bilaterally   []Other:    Joint mobility: 9/8   [x]Normal    []Hypo   []Hyper    Palpation: TTP along R achilles tendon; mild edema present  9/8    Functional Mobility/Transfers: Independent with ADL's and self care; unable to walk and run; unable to work out; pain and difficulty with stairs and squatting   9/8    Posture: WNL  9/8    Gait: (include devices/WB status) Antalgic gait present with walking boot  9/8                       [x] Patient history, allergies, meds reviewed. Medical chart reviewed. See intake form. 9/8    Review Of Systems (ROS):  [x]Performed Review of systems (Integumentary, CardioPulmonary, Neurological) by intake and observation. Intake form has been scanned into medical record. Patient has been instructed to contact their primary care physician regarding ROS issues if not already being addressed at this time.   9/8    Co-morbidities/Complexities (which will affect course of rehabilitation): []None           Arthritic conditions   []Rheumatoid arthritis (M05.9)  []Osteoarthritis (M19.91)   Cardiovascular conditions   []Hypertension (I10)  []Hyperlipidemia (E78.5)  []Angina pectoris (I20)  []Atherosclerosis (I70)   Musculoskeletal conditions   []Disc pathology   []Congenital spine pathologies   []Prior surgical intervention  []Osteoporosis (M81.8)  []Osteopenia (M85.8)   Endocrine conditions   []Hypothyroid (E03.9)  []Hyperthyroid Gastrointestinal conditions   []Constipation (F14.72)   Metabolic conditions   []Morbid obesity (E66.01)  []Diabetes type 1(E10.65) or 2 (E11.65)   []Neuropathy (G60.9)     Pulmonary conditions   []Asthma (J45)  []Coughing   []COPD (J44.9)   Psychological Disorders  [x]Anxiety (F41.9)  [x]Depression (F32.9)   []Other:   []Other:          Barriers to/and or personal factors that will affect rehab potential:              [x]Age  [x]Sex              [x]Motivation/Lack of Motivation                        []Co-Morbidities              []Cognitive Function, education/learning barriers              []Environmental, home barriers              []profession/work barriers  [x]past PT/medical experience  []other:  Justification:     Falls Risk Assessment (30 days):   [x] Falls Risk assessed and no intervention required.   [] Falls Risk assessed and Patient requires intervention due to being higher risk   TUG score (>12s at risk):     [] Falls education provided, including         ASSESSMENT:    Functional Impairments:     []Decreased LE joint mobility   [x]Decreased LE functional ROM   [x]Decreased core/proximal hip strength and neuromuscular control   [x]Decreased LE functional strength  [x]Reduced balance/proprioceptive control    []Foot biomechanics dysfunction requiring correction:   []other:    Functional Activity Limitations (from functional questionnaire and intake)   [x]Reduced ability to tolerate prolonged functional positions   [x]Reduced ability or difficulty with changes of positions or transfers between positions   [x]Reduced ability to maintain good posture and demonstrate good body mechanics with sitting, bending, and lifting   []Reduced ability to sleep   [] Reduced ability or tolerance with driving    [x]Reduced ability to squat  Participation Restrictions   []Reduced participation in self care activities   []Reduced participation in home management activities   []Reduced participation in work activities    [x]Reduced participation in social activities. [x]Reduced participation in sport activities. Classification :    []Signs/symptoms consistent with post-surgical status including decreased ROM, strength and function.    []Signs/symptoms consistent with joint sprain/strain   []Signs/symptoms consistent with patella-femoral syndrome   []Signs/symptoms consistent with knee OA/hip OA   []Signs/symptoms consistent with internal derangement of knee/Hip/ankle   [x]Signs/symptoms consistent with functional hip/knee/ankle-foot weakness/NMR control      [x]Signs/symptoms consistent with tendinitis/tendinosis    []signs/symptoms consistent with pathology which may benefit from Dry needling      []other:      Prognosis/Rehab Potential:      [] Excellent   [x]Good    []Fair   []Poor    Tolerance of evaluation/treatment:    []Excellent   [x]Good    []Fair   []Poor    Physical Therapy Evaluation Complexity Justification  [x] A history of present problem with:  [] no personal factors and/or comorbidities that impact the plan of care;  [x]1-2 personal factors and/or comorbidities that impact the plan of care  []3 personal factors and/or comorbidities that impact the plan of care  [x] An examination of body systems using standardized tests and measures addressing any of the following: body structures and functions (impairments), activity limitations, and/or participation restrictions;:  [x] a total of 1-2 or more elements   [] a total of 3 or more elements   [] a total of 4 or more elements [x] A clinical presentation with:  [x] stable and/or uncomplicated characteristics   [] evolving clinical presentation with changing characteristics  [] unstable and unpredictable characteristics;   [x] Clinical decision making of [x] low, [] moderate, [] high complexity using standardized patient assessment instrument and/or measurable assessment of functional outcome. [x] EVAL (LOW) 85008 (typically 20 minutes face-to-face)  [] EVAL (MOD) 02331 (typically 30 minutes face-to-face)  [] EVAL (HIGH) 74390 (typically 45 minutes face-to-face)  [] RE-EVAL       PLAN  Frequency/Duration:  1-2 days per week for 4 Weeks:  Interventions:  [x]  Therapeutic exercise including: strength training, ROM, for Lower extremity and core   [x]  NMR activation and proprioception for LE, Glutes and Core   [x]  Manual therapy as indicated for LE, Hip and spine to include: Dry Needling/IASTM, STM, PROM, Gr I-IV mobilizations, manipulation. [x] Modalities as needed that may include: thermal agents, E-stim, Biofeedback, US, iontophoresis as indicated  [x] Patient education on joint protection, postural re-education, activity modification, progression of HEP. HEP instruction:   Access Code: [email or web address]   URL: [email or web address]   Date: 09/08/2020   Prepared by: Darius Little       GOALS:   Patient stated goal:  Reduce pain; return to walking and running  [] Progressing: [] Met: [] Not Met: [] Adjusted    Therapist goals for Patient:   Short Term Goals: To be achieved in: 2 weeks  1. Independent in HEP and progression per patient tolerance, in order to prevent re-injury. [] Progressing: [] Met: [] Not Met: [] Adjusted   2. Patient will have a decrease in pain to facilitate improvement in movement, function, and ADLs as indicated by Functional Deficits. [] Progressing: [] Met: [] Not Met: [] Adjusted    Long Term Goals: To be achieved in: 4 weeks  1.  Disability index score of 20% or less for the LEFS to assist with

## 2020-09-09 ENCOUNTER — OFFICE VISIT (OUTPATIENT)
Dept: PRIMARY CARE CLINIC | Age: 25
End: 2020-09-09
Payer: COMMERCIAL

## 2020-09-09 VITALS
DIASTOLIC BLOOD PRESSURE: 68 MMHG | SYSTOLIC BLOOD PRESSURE: 100 MMHG | HEART RATE: 82 BPM | RESPIRATION RATE: 16 BRPM | TEMPERATURE: 98 F | OXYGEN SATURATION: 98 % | BODY MASS INDEX: 25.23 KG/M2 | HEIGHT: 66 IN | WEIGHT: 157 LBS

## 2020-09-09 PROCEDURE — 99213 OFFICE O/P EST LOW 20 MIN: CPT | Performed by: NURSE PRACTITIONER

## 2020-09-09 RX ORDER — CYCLOBENZAPRINE HCL 5 MG
TABLET ORAL
Qty: 90 TABLET | Refills: 1 | Status: SHIPPED | OUTPATIENT
Start: 2020-09-09 | End: 2021-12-08 | Stop reason: ALTCHOICE

## 2020-09-09 ASSESSMENT — PATIENT HEALTH QUESTIONNAIRE - PHQ9
SUM OF ALL RESPONSES TO PHQ9 QUESTIONS 1 & 2: 0
SUM OF ALL RESPONSES TO PHQ QUESTIONS 1-9: 0
1. LITTLE INTEREST OR PLEASURE IN DOING THINGS: 0
2. FEELING DOWN, DEPRESSED OR HOPELESS: 0
SUM OF ALL RESPONSES TO PHQ QUESTIONS 1-9: 0

## 2020-09-09 ASSESSMENT — ENCOUNTER SYMPTOMS
CHEST TIGHTNESS: 0
VOMITING: 0
COUGH: 0
COLOR CHANGE: 0
SHORTNESS OF BREATH: 0
DIARRHEA: 0
WHEEZING: 0
ABDOMINAL PAIN: 0
NAUSEA: 0

## 2020-09-09 NOTE — PROGRESS NOTES
ENCOUNTER DATE: 9/9/2020     NAME: Katherleen Leyden   AGE: 22 y.o. GENDER: male   YOB: 1995    Patient Active Problem List   Diagnosis    Right shoulder pain    Attention deficit hyperactivity disorder (ADHD), combined type    Constipation    Depression    Pain in both wrists    Extensor tendinitis of hand    Achilles tendon pain      No Known Allergies  Current Outpatient Medications on File Prior to Visit   Medication Sig Dispense Refill    diclofenac sodium (VOLTAREN) 1 % GEL Apply 2 g topically 4 times daily 1 Tube 5    diclofenac (VOLTAREN) 75 MG EC tablet Take 1 tablet by mouth 2 times daily 60 tablet 3    cyclobenzaprine (FLEXERIL) 5 MG tablet Take 1 tablet by mouth 3 times daily as needed for Muscle spasms 90 tablet 1    Lisdexamfetamine Dimesylate (VYVANSE) 60 MG CAPS Take 1 capsule by mouth daily . No current facility-administered medications on file prior to visit. Social History     Tobacco Use    Smoking status: Never Smoker    Smokeless tobacco: Never Used   Substance Use Topics    Alcohol use: Yes     Alcohol/week: 4.0 standard drinks     Types: 4 Cans of beer per week     Comment: Social      CARE TEAM  Patient Care Team:  Aby Pandya MD as PCP - General (Internal Medicine)  Aby Pandya MD as PCP - [facility] Empaneled Provider  Carl Arroyo as Consulting Physician (Pediatrics)    Chief Complaint   Patient presents with    Hand Pain     both  hands        HPI:   Pt is here for a problem visit. Complains of bilateral wrist pain and hand pain. Feels like tendonitis keeps \"flaring up\". Would like to see hand specialists. Has been in PT before with extensor tendonitis and this helped. No numbness. No weakness. No tingling. No  weakness. Does a lot of computer work. \"Doesn't feel like carpal tunnel\". Has tried rest, ice, heat, diclofenac  Will improve, but pain worsens the more he uses them.  \"Seems like cycle keeps repeating itself\". Would like to see hand specialist to see if injections is a possibility. Hasn't been wearing braces at night, made it feel worse. Currently in PT for heel pain. Seems to be helping. ROS:  Review of Systems   Constitutional: Negative for activity change, chills, fatigue and fever. Respiratory: Negative for cough, chest tightness, shortness of breath and wheezing. Cardiovascular: Negative for chest pain, palpitations and leg swelling. Gastrointestinal: Negative for abdominal pain, diarrhea, nausea and vomiting. Musculoskeletal: Positive for arthralgias and joint swelling. Negative for myalgias, neck pain and neck stiffness. Skin: Negative for color change, pallor and rash. VITALS:  /68   Pulse 82   Temp 98 °F (36.7 °C) (Oral)   Resp 16   Ht 5' 6\" (1.676 m)   Wt 157 lb (71.2 kg)   SpO2 98%   BMI 25.34 kg/m²      PE:  Physical Exam  Vitals signs and nursing note reviewed. Constitutional:       General: He is not in acute distress. Appearance: Normal appearance. He is well-developed and normal weight. HENT:      Head: Normocephalic and atraumatic. Neck:      Musculoskeletal: Normal range of motion and neck supple. Cardiovascular:      Rate and Rhythm: Normal rate and regular rhythm. Heart sounds: Normal heart sounds. No murmur. No friction rub. Pulmonary:      Effort: Pulmonary effort is normal. No respiratory distress. Breath sounds: Normal breath sounds. No wheezing, rhonchi or rales. Abdominal:      General: Abdomen is flat. There is no distension. Palpations: Abdomen is soft. Musculoskeletal:      Right wrist: He exhibits decreased range of motion and tenderness. He exhibits no bony tenderness, no swelling, no effusion, no crepitus and no deformity. Left wrist: He exhibits decreased range of motion and tenderness. He exhibits no bony tenderness, no swelling, no effusion, no crepitus and no deformity.       Right hand: Normal. He exhibits normal range of motion, no tenderness and no bony tenderness. Normal sensation noted. Normal strength noted. Left hand: Normal. He exhibits normal range of motion, no tenderness and no bony tenderness. Normal sensation noted. Normal strength noted. Lymphadenopathy:      Cervical: No cervical adenopathy. Skin:     General: Skin is warm and dry. Coloration: Skin is not pale. Findings: No erythema or rash. Neurological:      Mental Status: He is alert and oriented to person, place, and time. ASSESSMENT/PLAN:  1. Extensor tendinitis of hand  Refer back to ortho for further eval. Pt would like to discuss possible injections. Will call to schedule apt. Refer back to PT/OT. Will call to schedule apt. Continue with ice/heat/stretching and NSAIDs.   - [address] [facility] PT - [facility]    2. Pain in both wrists  - [facility] - [facility]      Return if symptoms worsen or fail to improve.      Electronically signed by ISABELL Gao CNP on 9/9/2020 at 12:03 PM

## 2020-09-09 NOTE — TELEPHONE ENCOUNTER
Medication:   Requested Prescriptions     Pending Prescriptions Disp Refills    cyclobenzaprine (FLEXERIL) 5 MG tablet [Pharmacy Med Name: CYCLOBENZAPRINE 5MG TABLETS] 90 tablet 1     Sig: TAKE 1 TABLET BY MOUTH THREE TIMES DAILY AS NEEDED FOR MUSCLE SPASMS     Last Filled: 3.18.20    Last appt: 8/10/2020   Next appt: 9/18/2020    Last OARRS: No flowsheet data found.

## 2020-09-11 ENCOUNTER — HOSPITAL ENCOUNTER (OUTPATIENT)
Dept: PHYSICAL THERAPY | Age: 25
Setting detail: THERAPIES SERIES
Discharge: HOME | End: 2020-09-11
Payer: COMMERCIAL

## 2020-09-11 PROCEDURE — 97140 MANUAL THERAPY 1/> REGIONS: CPT

## 2020-09-11 PROCEDURE — 97112 NEUROMUSCULAR REEDUCATION: CPT

## 2020-09-11 PROCEDURE — 97110 THERAPEUTIC EXERCISES: CPT

## 2020-09-11 NOTE — FLOWSHEET NOTE
The [address] [facility] [address], [address] [facility] [address]    [place], [place]       Physical Therapy Daily Treatment Note  Date:  2020    Patient Name:  Jaquan Hernandez    :  1995  MRN: [id number]  Restrictions/Precautions:    Medical/Treatment Diagnosis Information:  · Diagnosis: M76.60 (ICD-10-CM) - Achilles tendon pain  · Treatment Diagnosis: M25.571 Pain in Right Ankle  Insurance/Certification information:  PT Insurance Information: [place]  Physician Information:  Referring Practitioner: Davide Sanchez MD  Has the plan of care been signed (Y/N):        []  Yes  [x]  No     Date of Patient follow up with Physician:       Is this a Progress Report:     []  Yes  [x]  No        If Yes:  Date Range for reporting period:  Beginning  Ending    Progress report will be due (10 Rx or 30 days whichever is less):        Recertification will be due (POC Duration  / 90 days whichever is less):          Visit # Insurance Allowable Auth Required   2   60 []  Yes [x]  No        Functional Scale: LEFS: 47.5% deficit   Date assessed: 20       Latex Allergy:  [x]NO      []YES  Preferred Language for Healthcare:   [x]English       []other:      Pain level:  0-5/10     SUBJECTIVE:   Patient states that he feels better today. He states that his last session helped.       OBJECTIVE:   ROM   PROM AROM Comments    Left Right Left Right    Dorsiflexion   0 Lacking 5    Plantarflexion   45 25    Inversion   45 25    Eversion   1 1                      Strength   Left Right Comments   Dorsiflexion 5 5    Plantarflexion 5 4+    Inversion 5 5    Eversion 5 4+        Reflexes/Sensation:    [x]Dermatomes/Myotomes intact    [x]Reflexes equal and normal bilaterally   []Other:    Joint mobility:    [x]Normal    []Hypo   []Hyper    Palpation: TTP along R achilles tendon; mild edema present      Functional Mobility/Transfers: Independent with ADL's and self care; unable to walk and run; unable to work out; pain and difficulty with stairs and squatting   9/8    Posture: WNL  9/8    Gait: (include devices/WB status) Antalgic gait present with walking boot  9/8                       [x] Patient history, allergies, meds reviewed. Medical chart reviewed. See intake form. 9/8    Review Of Systems (ROS):   [x]Performed Review of systems (Integumentary, CardioPulmonary, Neurological) by intake and observation. Intake form has been scanned into medical record. Patient has been instructed to contact their primary care physician regarding ROS issues if not already being addressed at this time.   9/8      RESTRICTIONS/PRECAUTIONS: None    Exercises/Interventions:     Exercise/Equipment Resistance/Repetitions Other comments   ROM     ABC'S     BAPS     Bottle Roll     Inversion/Eversion     Ankle Pumps 30x    Toe Curls     Rocks     Towels          Stretching     Circles 30x CW and CCW    Toe Extension      Towel Pull 1 30 sec x 5    Towel Pull 2     ERMI     Incline Stretch     Pro-Stretch     Hamstring 30 sec x 5 Start 9/11   Stair Stretch     Calf 1 (standing gastroc) 30 sec x 5    Calf 2 (standing soleus) 30 sec x 5         Isometrics     Dorsiflexion 10 sec x 10 Start 9/11   Plantarflexion 10 sec x 10 Start 9/11   Inversion 10 sec x 10 Start 9/11   Eversion 10 sec x 10 Start 9/11        PRE's     Dorsiflexion     Plantarflexion     Inversion     Eversion     Heel walk     Toe walk     SLR     Calf Raises     Step Up     Knee Extension     Hamstring Curls     Leg Press          Balance:     Rocker Board     BOSU     SLS     Aeromat     Foam Roll     Plyoback     Tandem Stance     Biodex          Bike     Treadmill          Manual interventions     IASTM posterior R calf and achilles tendon  15 min Start  9/8       Therapeutic Exercise and NMR EXR  [x] (49145) Provided verbal/tactile cueing for activities related to strengthening, flexibility, endurance, ROM for improvements in LE, proximal hip, and core control with self care, mobility, lifting, ambulation.  [] (23310) Provided verbal/tactile cueing for activities related to improving balance, coordination, kinesthetic sense, posture, motor skill, proprioception  to assist with LE, proximal hip, and core control in self care, mobility, lifting, ambulation and eccentric single leg control. NMR and Therapeutic Activities:    [] (95202 or 06671) Provided verbal/tactile cueing for activities related to improving balance, coordination, kinesthetic sense, posture, motor skill, proprioception and motor activation to allow for proper function of core, proximal hip and LE with self care and ADLs  [] (27438) Gait Re-education- Provided training and instruction to the patient for proper LE, core and proximal hip recruitment and positioning and eccentric body weight control with ambulation re-education including up and down stairs     Home Exercise Program:    [x] (63468) Reviewed/Progressed HEP activities related to strengthening, flexibility, endurance, ROM of core, proximal hip and LE for functional self-care, mobility, lifting and ambulation/stair navigation   [] (05801)Reviewed/Progressed HEP activities related to improving balance, coordination, kinesthetic sense, posture, motor skill, proprioception of core, proximal hip and LE for self care, mobility, lifting, and ambulation/stair navigation     Access Code: [email or web address]   URL: [email or web address]   Date: 09/08/2020   Prepared by: Isidoro Lilly   Manual Treatments:  PROM / STM / Oscillations-Mobs:  G-I, II, III, IV (PA's, Inf., Post.)  [] (77543) Provided manual therapy to mobilize LE, proximal hip and/or LS spine soft tissue/joints for the purpose of modulating pain, promoting relaxation,  increasing ROM, reducing/eliminating soft tissue swelling/inflammation/restriction, improving soft tissue extensibility and allowing for proper ROM for normal function with self care, mobility, lifting and ambulation. Modalities:      Charges:  Timed Code Treatment Minutes: 45   Total Treatment Minutes: 60     [] EVAL (LOW) 96866 (typically 20 minutes face-to-face)  [] EVAL (MOD) 14184 (typically 30 minutes face-to-face)  [] EVAL (HIGH) 60933 (typically 45 minutes face-to-face)  [] RE-EVAL     [x] YJ(91315) x 1     [] IONTO  [x] NMR (79352) x 1    [] VASO  [x] Manual (88796) x 1     [] Other:  [] TA x      [] Mech Traction (23729)  [] ES(attended) (07017)      [] ES (un) (15230):     GOALS:   Patient stated goal:  Reduce pain; return to walking and running  [] Progressing: [] Met: [] Not Met: [] Adjusted    Therapist goals for Patient:   Short Term Goals: To be achieved in: 2 weeks  1. Independent in HEP and progression per patient tolerance, in order to prevent re-injury. [] Progressing: [] Met: [] Not Met: [] Adjusted   2. Patient will have a decrease in pain to facilitate improvement in movement, function, and ADLs as indicated by Functional Deficits. [] Progressing: [] Met: [] Not Met: [] Adjusted    Long Term Goals: To be achieved in: 4 weeks  1. Disability index score of 20% or less for the LEFS to assist with reaching prior level of function. [] Progressing: [] Met: [] Not Met: [] Adjusted  2. Patient will demonstrate increased AROM to WNL to allow for proper joint functioning as indicated by patients Functional Deficits. [] Progressing: [] Met: [] Not Met: [] Adjusted  3. Patient will demonstrate an increase in Strength to good proximal hip strength and control in LE to allow for proper functional mobility as indicated by patients Functional Deficits. [] Progressing: [] Met: [] Not Met: [] Adjusted  4. Patient will return to Independent functional activities without increased symptoms or restriction. [] Progressing: [] Met: [] Not Met: [] Adjusted  5. Patient will report returning to running for 1 mile or longer with no increase in pain.    [] Progressing: [] Met: [] Not Met: [] Adjusted     Overall Progression Towards Functional goals/ Treatment Progress Update:  [] Patient is progressing as expected towards functional goals listed. [] Progression is slowed due to complexities/Impairments listed. [] Progression has been slowed due to co-morbidities. [x] Plan just implemented, too soon to assess goals progression <30days   [] Goals require adjustment due to lack of progress  [] Patient is not progressing as expected and requires additional follow up with physician  [] Other    Prognosis for POC: [x] Good [] Fair  [] Poor      Patient requires continued skilled intervention: [x] Yes  [] No    Treatment/Activity Tolerance:  [x] Patient able to complete treatment  [] Patient limited by fatigue  [] Patient limited by pain     [] Patient limited by other medical complications  [x] Other: 9/11 Patient tolerated treatment well this session. HEP provided and reviewed with patient. Reported feeling better at end of session. Continue to progress as tolerated. Patient Education:                  PLAN: See eval  [x] Continue per plan of care [] Alter current plan (see comments above)  [] Plan of care initiated [] Hold pending MD visit [] Discharge      Electronically signed by:  Colleen Rodriguez PT, DPT    Note: If patient does not return for scheduled/ recommended follow up visits, this note will serve as a discharge from care along with most recent update on progress.

## 2020-09-15 ENCOUNTER — HOSPITAL ENCOUNTER (OUTPATIENT)
Dept: PHYSICAL THERAPY | Age: 25
Setting detail: THERAPIES SERIES
Discharge: HOME | End: 2020-09-15
Payer: COMMERCIAL

## 2020-09-15 PROCEDURE — 97035 APP MDLTY 1+ULTRASOUND EA 15: CPT

## 2020-09-15 PROCEDURE — 97140 MANUAL THERAPY 1/> REGIONS: CPT

## 2020-09-15 PROCEDURE — 97110 THERAPEUTIC EXERCISES: CPT

## 2020-09-15 PROCEDURE — 97112 NEUROMUSCULAR REEDUCATION: CPT

## 2020-09-15 NOTE — FLOWSHEET NOTE
The [address] [facility] [address], [address] [facility] [address]    [place], [place]       Physical Therapy Daily Treatment Note  Date:  9/15/2020    Patient Name:  Fransisco Huertas    :  1995  MRN: [id number]  Restrictions/Precautions:    Medical/Treatment Diagnosis Information:  · Diagnosis: M76.60 (ICD-10-CM) - Achilles tendon pain  · Treatment Diagnosis: M25.571 Pain in Right Ankle  Insurance/Certification information:  PT Insurance Information: [place]  Physician Information:  Referring Practitioner: Betsy Tyson MD  Has the plan of care been signed (Y/N):        []  Yes  [x]  No     Date of Patient follow up with Physician:       Is this a Progress Report:     []  Yes  [x]  No        If Yes:  Date Range for reporting period:  Beginning  Ending    Progress report will be due (10 Rx or 30 days whichever is less):        Recertification will be due (POC Duration  / 90 days whichever is less):          Visit # Insurance Allowable Auth Required   3  9/15 60 []  Yes [x]  No        Functional Scale: LEFS: 47.5% deficit   Date assessed: 20       Latex Allergy:  [x]NO      []YES  Preferred Language for Healthcare:   [x]English       []other:      Pain level:  0/10 9/15    SUBJECTIVE:  9/15 Patient states that he was sore after his last session. He states that he feels better today.      OBJECTIVE:   ROM   PROM AROM Comments    Left Right Left Right    Dorsiflexion   0 Lacking 5    Plantarflexion   45 25    Inversion   45 25    Eversion   1 1                      Strength   Left Right Comments   Dorsiflexion 5 5    Plantarflexion 5 4+    Inversion 5 5    Eversion 5 4+        Reflexes/Sensation:    [x]Dermatomes/Myotomes intact    [x]Reflexes equal and normal bilaterally   []Other:    Joint mobility:    [x]Normal    []Hypo   []Hyper    Palpation: TTP along R achilles tendon; mild edema present      Functional Mobility/Transfers: Independent with ADL's and self care; unable to walk and run; unable to work out; pain and difficulty with stairs and squatting   9/8    Posture: WNL  9/8    Gait: (include devices/WB status) Antalgic gait present with walking boot  9/8                       [x] Patient history, allergies, meds reviewed. Medical chart reviewed. See intake form. 9/8    Review Of Systems (ROS):   [x]Performed Review of systems (Integumentary, CardioPulmonary, Neurological) by intake and observation. Intake form has been scanned into medical record. Patient has been instructed to contact their primary care physician regarding ROS issues if not already being addressed at this time.   9/8      RESTRICTIONS/PRECAUTIONS: None    Exercises/Interventions:     Exercise/Equipment Resistance/Repetitions Other comments   ROM     ABC'S     BAPS     Bottle Roll     Inversion/Eversion     Ankle Pumps 30x    Toe Curls     Rocks     Towels          Stretching     Circles 30x CW and CCW    Toe Extension      Towel Pull 1 30 sec x 5    Towel Pull 2     ERMI     Incline Stretch     Pro-Stretch     Hamstring 30 sec x 5 Start 9/11   Stair Stretch     Calf 1 (standing gastroc) 30 sec x 5    Calf 2 (standing soleus) 30 sec x 5         Isometrics     Dorsiflexion 10 sec x 10 Start 9/11   Plantarflexion 10 sec x 10 Start 9/11   Inversion 10 sec x 10 Start 9/11   Eversion 10 sec x 10 Start 9/11        PRE's     Dorsiflexion     Plantarflexion     Inversion     Eversion     Heel walk     Toe walk     SLR     Calf Raises     Step Up     Knee Extension     Hamstring Curls     Leg Press          Balance:     Rocker Board     BOSU     SLS     Aeromat     Foam Roll     Plyoback     Tandem Stance     Biodex          Bike     Treadmill          Manual interventions     IASTM posterior R calf and achilles tendon  15 min Start  9/8       Therapeutic Exercise and NMR EXR  [x] (56300) Provided verbal/tactile cueing for activities related to strengthening, flexibility, endurance, ROM for improvements in LE, proximal hip, and core control with self care, mobility, lifting, ambulation.  [] (56461) Provided verbal/tactile cueing for activities related to improving balance, coordination, kinesthetic sense, posture, motor skill, proprioception  to assist with LE, proximal hip, and core control in self care, mobility, lifting, ambulation and eccentric single leg control. NMR and Therapeutic Activities:    [] (30072 or 42320) Provided verbal/tactile cueing for activities related to improving balance, coordination, kinesthetic sense, posture, motor skill, proprioception and motor activation to allow for proper function of core, proximal hip and LE with self care and ADLs  [] (08948) Gait Re-education- Provided training and instruction to the patient for proper LE, core and proximal hip recruitment and positioning and eccentric body weight control with ambulation re-education including up and down stairs     Home Exercise Program:    [x] (54412) Reviewed/Progressed HEP activities related to strengthening, flexibility, endurance, ROM of core, proximal hip and LE for functional self-care, mobility, lifting and ambulation/stair navigation   [] (42908)Reviewed/Progressed HEP activities related to improving balance, coordination, kinesthetic sense, posture, motor skill, proprioception of core, proximal hip and LE for self care, mobility, lifting, and ambulation/stair navigation     Access Code: [email or web address]   URL: [email or web address]   Date: 09/08/2020   Prepared by: Beryle Beans   Manual Treatments:  PROM / STM / Oscillations-Mobs:  G-I, II, III, IV (PA's, Inf., Post.)  [] (33280) Provided manual therapy to mobilize LE, proximal hip and/or LS spine soft tissue/joints for the purpose of modulating pain, promoting relaxation,  increasing ROM, reducing/eliminating soft tissue swelling/inflammation/restriction, improving soft tissue extensibility and allowing for proper ROM for normal function with self Progressing: [] Met: [] Not Met: [] Adjusted     Overall Progression Towards Functional goals/ Treatment Progress Update:  [] Patient is progressing as expected towards functional goals listed. [] Progression is slowed due to complexities/Impairments listed. [] Progression has been slowed due to co-morbidities. [x] Plan just implemented, too soon to assess goals progression <30days   [] Goals require adjustment due to lack of progress  [] Patient is not progressing as expected and requires additional follow up with physician  [] Other    Prognosis for POC: [x] Good [] Fair  [] Poor      Patient requires continued skilled intervention: [x] Yes  [] No    Treatment/Activity Tolerance:  [x] Patient able to complete treatment  [] Patient limited by fatigue  [] Patient limited by pain     [] Patient limited by other medical complications  [x] Other: 9/15 Patient tolerated treatment well this session. HEP provided and reviewed with patient. Reported feeling better at end of session. Continue to progress as tolerated. Patient Education:                  PLAN: See eval  [x] Continue per plan of care [] Alter current plan (see comments above)  [] Plan of care initiated [] Hold pending MD visit [] Discharge      Electronically signed by:  Afia Gonsalves, PT, DPT    Note: If patient does not return for scheduled/ recommended follow up visits, this note will serve as a discharge from care along with most recent update on progress.

## 2020-09-16 ENCOUNTER — HOSPITAL ENCOUNTER (OUTPATIENT)
Dept: OCCUPATIONAL THERAPY | Age: 25
Setting detail: THERAPIES SERIES
Discharge: HOME | End: 2020-09-16
Payer: COMMERCIAL

## 2020-09-16 PROCEDURE — 97165 OT EVAL LOW COMPLEX 30 MIN: CPT | Performed by: OCCUPATIONAL THERAPIST

## 2020-09-16 PROCEDURE — 97112 NEUROMUSCULAR REEDUCATION: CPT | Performed by: OCCUPATIONAL THERAPIST

## 2020-09-16 PROCEDURE — 97110 THERAPEUTIC EXERCISES: CPT | Performed by: OCCUPATIONAL THERAPIST

## 2020-09-16 PROCEDURE — 97022 WHIRLPOOL THERAPY: CPT | Performed by: OCCUPATIONAL THERAPIST

## 2020-09-16 NOTE — PLAN OF CARE
[address] [facility], [place]  [address],  [address], [address] [facility]  Phone: [phone] Fax: [phone]            Occupational Therapy/Hand Therapy Certification  Dear Referring Practitioner: ISABELL Murillo CNP,     We had the pleasure of evaluating the following patient for occupational therapy services at [address] [place] [address]. A summary of our findings can be found in the initial assessment below. This includes our plan of care. If you have any questions or concerns regarding these findings, please do not hesitate to contact me at the office phone number checked above.   Thank you for the referral.     Physician Signature:_______________________________Date:__________________  By signing above (or electronic signature), therapists plan is approved by physician      Patient: Nancy Castillo   : 1995   MRN: [id number]  Referring Physician: Referring Practitioner: ISABELL Murillo CNP      Evaluation Date: 2020      Medical Diagnosis Information:  Diagnosis: M77.9 (ICD-10-CM) - Extensor tendinitis of hand    Treatment Diagnosis: M25.531, M25.532 (ICD-10-CM) - Pain in both wrists              Insurance information: OT Insurance Information: [place]      Date of Injury: NA  Date of Surgery: NA    Date of Patient follow up with Physician: prn    RESTRICTIONS/PRECAUTIONS: -    Latex Allergy:  [x]No      []Yes  Pacemaker:  [x] No       [] Yes     Preferred Language for Healthcare:   [x]English       []other:     Functional Scale: 34% (Quick DASH)   Date assessed:  2020    SUBJECTIVE: Patient reported deficits/history of current problem: progressive pain in B FAs x several months, especially when playing guitar, writing, keyboarding, etc; seen by hand surgeon in spring and referred to this OT; seen 2x but treatment halted due to pandemic/restrictions; pt recently referred back to therapy by CNP; currently also being seen by PT at this location for ankle pain    Pain Scale: 0/10 at rest; 2-3/10 at times \"more of a tightness\"  []Constant      [x]Intermittent    []other:  Pain Location:  B SF, dorsal hands/wrists  Easing factors: rest, ice  Provocative factors: physical exertion, cooking, video games    [x] Patient reported history, allergies, and medications reviewed - see intake form. Occupational Profile:  Home Enviroment: lives with  [x]? spouse,  [x]? family,  []? alone,  []? significant other,   []? 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 Independent with ADLs/IADLs                           []? Assistance needed (describe):     Patient-Identified Primary Performance Deficits (to be addressed in POC):   []? bathing                               [x]? household tasks    []? dressing                             []? self feeding   []? grooming                            [x]? work/education   []? functional mobility              []? sleeping/rest   []? toileting/hygiene                 [x]? recreational activities   []? driving                                []? community/social participation   []? other:      Comorbidities Affecting Functional Performance:             [x]? Anxiety (F41.9)/Depression (F32.9)             []?Diabetes Type 1(E10.65) or 2 (E11.65)       []? Rheumatoid Arthritis (M05.9)  []? Fibromyalgia (M79.7)  []? Neuropathy(G60.9)  []? Osteoarthritis(M19.91)  []? None             [x]? Other: R shoulder labral tear    Hand Dominance:    [x]? Right    []? Left    OBJECTIVE:    Involved Involved   AROM: Right Left   Digits: tips to DPFC   0cm   0cm   Thumb tip to DPFC 0cm 0cm   Wrist Ext/Flex            RD/UD 75/75  25/35 75/66  25/35   Forearm sup/pron   WNL WNL   Elbow ext/flex   WNL WNL   Shoulder Flex                   Abd                   IR/ER WNL WNL        Edema:      At disability questionnaire for increased performance with carrying, moving, and handling objects. [] Progressing: [] Met: [] Not Met: [] Adjusted   3) Pt will demonstrate increased ROM to L wrist flex >/= 70 deg for improved independence with household activities/cooking/stirring. [] Progressing: [] Met: [] Not Met: [] Adjusted   4) Pt will demonstrate increased strength to B  +5# for improved independence with opening jars/lids. [] Progressing: [] Met: [] Not Met: [] Adjusted   5) Pt will have a decrease in pain to 1-2/10 to facilitate return to recreational activities (playing video games) and work tasks (writing). [] Progressing: [] Met: [] Not Met: [] Adjusted        OCCUPATIONAL THERAPY EVALUATION COMPLEXITY JUSTIFICATION:    [x] An occupational profile and medical/therapy history, which includes:   [x] a brief history including medical and/or therapy records relating to the     presenting problem   [] an expanded review of medical and/or therapy records and additional review     of physical, cognitive or psychosocial history related to current functional    performance   [] an extensive additional review of review of medical and/or therapy records and physical, cognitive, or psychosocial history related to current    functional performance    [x] An assessment that identifies performance deficits (relating to physical, cognitive, or psychosocial skills) that result in activity limitations and/or participation restrictions:   [x] 1-3 performance deficits   [] 3-5 performance deficits   [] 5 or more performance deficits    [x] Clinical decision making of:   [x] low complexity, including analysis of occupational profile, data analysis from problem focused assessment, and consideration of a limited number of treatment options. No comorbidities affect occupational performance. No task modifications or assistance needed to complete evaluation.    [] moderate complexity, including analysis of occupational

## 2020-09-16 NOTE — FLOWSHEET NOTE
[facility]  [address], [address] [facility], [address] [facility]  Phone: [phone] Fax: [phone]    Occupational Therapy Treatment Note/ Progress Report:     Date:  2020    Patient Name:  Sushma Craft    :  1995  MRN: [id number]    Medical/Treatment Diagnosis Information:  · Diagnosis: M77.9 (ICD-10-CM) - Extensor tendinitis of hand   · Treatment Diagnosis: M25.531, M25.532 (ICD-10-CM) - Pain in both wrists     Insurance/Certification information:  OT Insurance Information: Lázaro  Physician Information:  Referring Practitioner: ISABELL Medrano CNP  Has the plan of care been signed (Y/N):        []  Yes  [x]  No       Visit # Insurance Allowable Auth Required   1 60  (has used 2 OT visits at this facility in winter of 2020, and is currently being seen by PT at this facility) []  Yes []  No        Is this a Progress Report:     []  Yes  [x]  No      If Yes:  Date Range for reporting period:   Beginning 20  Ending    Progress report will be due (10 Rx or 30 days whichever is less):      Recertification will be due (POC Duration  / 90 days whichever is less): 10/28/20        Date of Injury: NA  Date of Surgery: NA     Date of Patient follow up with Physician: prn     RESTRICTIONS/PRECAUTIONS: -     Latex Allergy:  [x]? No      []? Yes                    Pacemaker:  [x]? No       []? Yes      Preferred Language for Healthcare:   [x]? English       []? other:      Functional Scale: 34% (Quick DASH)                                 Date assessed:  2020     SUBJECTIVE: Patient reported deficits/history of current problem: progressive pain in B FAs x several months, especially when playing guitar, writing, keyboarding, etc; seen by hand surgeon in spring and referred to this OT; seen 2x but treatment halted due to pandemic/restrictions; pt recently referred back to therapy by CNP; currently also being seen by PT at this location for ankle pain     Pain Scale: 0/10 at rest; 2-3/10 at times \"more of a tightness\"          []? Constant                [x]? Intermittent      []? other:    OBJECTIVE:       Date:  9/16/2020     Objective Measures/Tests:      ROM: See eval                       Strength:            Observations: Other:                  MODALITIES:      Fluidotherapy (11097) 15'     Estim (38151/31471)      Paraffin (44987)      US (38743)      Iontophoresis (26905)      Hot Pack      Cold Pack            INTERVENTIONS:      Therapeutic Exercise (79692)      Composite Stretching 10x2 wrist/digital extensor stretch, wrist/digital flexor stretch     Corner Stretch 10x     Shoulder circles 10x3     Exercise Retraining/proximal stabilization TBand column - 10x2 each rowing, shoulder ext, triceps/elbow ext     Therapeutic Activity (89835)                              Manual Therapy (20753) 3' STM FA, hand, wrist                 Neuromuscular Reeducation (63756) Cueing for exercise technique, submaximal gripping, proximal stabilization, neutral wrist                   ADL Training (46475) Instructed on diagnosis specific anatomy, joint protection, and ADL modifications                   HEP Training/Review See sheet(s)      Access Code: [email or web address]   URL: [email or web address]   Date: 09/16/2020   Prepared by:  [employer]     Exercises   Composite Wrist Extensor Stretch - Shoulder Down - 3-5 reps - 5-10 sec hold - 3-4x daily - 7x weekly   Finger, wrist extension stretch - 3-5 reps - 5-10 sec hold - 3-4x daily - 7x weekly   Corner Stretch - 3-5 reps - 5-10 sec hold - 3-4x daily - 7x weekly   Shoulder Circles - 3-5 reps - 3-4x daily - 7x weekly   Rowing - 10 reps - 1-2 sets - 1-2x daily - 7x weekly   Shoulder Extension Resistance Band - 10 reps - 1-2 sets - 1-2x daily - 7x weekly   Triceps - Elbow Extension Resistance Band - 10 reps - 3 sets - 1-2x daily - 7x weekly              Splinting      Lcode:      Orthotic Mgmt, Subsequent Enc (12835)      Orthotic Mgmt & Training (70828)            Other:                                Therapeutic Exercise & NMR:  [x] (52048) Provided verbal/tactile cueing for activities related to strengthening, flexibility, endurance, ROM  for improvements in scapular, scapulothoracic and UE control with self care, reaching, carrying, lifting, house/yardwork, driving/computer work.    [] (93252) Provided verbal/tactile cueing for activities related to improving balance, coordination, kinesthetic sense, posture, motor skill, proprioception  to assist with  scapular, scapulothoracic and UE control with self care, reaching, carrying, lifting, house/yardwork, driving/computer work.     Therapeutic Activities & NMR:    [] (74886 or 99920) Provided verbal/tactile cueing for activities related to improving balance, coordination, kinesthetic sense, posture, motor skill, proprioception and motor activation to allow for proper function of scapular, scapulothoracic and UE control with self care, carrying, lifting, driving/computer work    Home Exercise Program:    [x] (51791) Reviewed/Progressed HEP activities related to strengthening, flexibility, endurance, ROM of scapular, scapulothoracic and UE control with self care, reaching, carrying, lifting, house/yardwork, driving/computer work  [] (04034) Reviewed/Progressed HEP activities related to improving balance, coordination, kinesthetic sense, posture, motor skill, proprioception of scapular, scapulothoracic and UE control with self care, reaching, carrying, lifting, house/yardwork, driving/computer work      Manual Treatments:  PROM / STM / Oscillations-Mobs:  G-I, II, III, IV (PA's, Inf., Post.)  [x] (60493) Provided manual therapy to mobilize soft tissue/joints of cervical/CT, scapular GHJ and UE for the purpose of modulating pain, promoting relaxation,  increasing ROM, reducing/eliminating soft tissue swelling/inflammation/restriction, improving soft tissue extensibility and allowing for proper ROM for normal function with self care, reaching, carrying, lifting, house/yardwork, driving/computer work    ADL Training:  [x] (91711) Provided self-care/home management training related to activities of daily living and compensatory training, and/or use of adaptive equipment      Charges:  Timed Code Treatment Minutes: 40   Total Treatment Minutes: 55   Worker's Comp: Time In/Time Out     [x] EVAL (LOW) 32391 (typically 20 minutes face-to-face)    [] EVAL (MOD) 64463 (typically 30 minutes face-to-face)  [] EVAL (HIGH) 79308 (typically 45 minutes face-to-face)  [] OT Re-eval (02504)       [x] Snow [phone] x 1      [] OSDKS(05225)  [x] NMR (92994) x 1      [] Estim (attended) (68689)   [] Manual ([id number]) x      [] US (98217)  [] TA () x      [] Paraffin (07055)  [] ADL  ([phone]) x     [] Splint/L code:    [] Estim (unattended) 33 [phone] 31)  [x] Fluidotherapy (90849)  [] Other:      ASSESSMENT:  See eval    GOALS:  Patient stated goal: to be able to resume activities without pain    [] Progressing: [] Met: [] Not Met: [] Adjusted    Therapist goals for Patient:   Short Term Goals: To be achieved in: 2 weeks  1. Independent in HEP and progression per patient tolerance, in order to prevent re-injury. [] Progressing: [] Met: [] Not Met: [] Adjusted   2. Patient will have a decrease in pain to facilitate improvement in movement, function, and ADLs as indicated by Functional Deficits. [] Progressing: [] Met: [] Not Met: [] Adjusted    Long Term Goals to be achieved in 4-6 weeks (through 10/28/20), including patient directed goals to address patient identified performance deficits:  1) Pt to be independent in graded HEP progression with a good level of effort and compliance. [] Progressing: [] Met: [] Not Met: [] Adjusted   2) Pt to report a score of </= 25 % on the Quick DASH disability questionnaire for increased performance with carrying, moving, and handling objects.   [] Progressing: [] Met: [] Not Met: [] Adjusted   3) Pt will demonstrate increased ROM to L wrist flex >/= 70 deg for improved independence with household activities/cooking/stirring. [] Progressing: [] Met: [] Not Met: [] Adjusted   4) Pt will demonstrate increased strength to B  +5# for improved independence with opening jars/lids. [] Progressing: [] Met: [] Not Met: [] Adjusted   5) Pt will have a decrease in pain to 1-2/10 to facilitate return to recreational activities (playing video games) and work tasks (writing). [] Progressing: [] Met: [] Not Met: [] Adjusted      Overall Progression Towards Functional Goals/Treatment Progress Update:  [] Patient is progressing as expected towards functional goals listed. [] Progression is slowed due to complexities/impairments listed. [] Progression has been slowed due to co-morbidities. [x] Plan just implemented, too soon to assess goals progression <30 days  [] Goals require adjustment due to lack of progress  [] Patient is not progressing as expected and requires additional follow up with physician  [] All goals are met  [] Other:     Prognosis for POC: [x] Good [] Fair  [] Poor    Patient requires continued skilled intervention: [x] Yes  [] No    Treatment/Activity Tolerance:  [x] Patient able to complete treatment  [] Patient limited by fatigue  [] Patient limited by pain    [] Patient limited by other medical complications  [] Other:                  PLAN: See eval  [] Continue per plan of care [] Alter current plan (see comments above)  [x] Plan of care initiated [] Hold pending MD visit [] Discharge      Electronically signed by: Susy Barkley OTR/L, PT, MPT, CHT, [id number], [id number]      Note: If patient does not return for scheduled/ recommended follow up visits, this note will serve as a discharge from care along with most recent update on progress.

## 2020-09-18 ENCOUNTER — OFFICE VISIT (OUTPATIENT)
Dept: PRIMARY CARE CLINIC | Age: 25
End: 2020-09-18
Payer: COMMERCIAL

## 2020-09-18 ENCOUNTER — HOSPITAL ENCOUNTER (OUTPATIENT)
Dept: PHYSICAL THERAPY | Age: 25
Setting detail: THERAPIES SERIES
Discharge: HOME | End: 2020-09-18
Payer: COMMERCIAL

## 2020-09-18 VITALS
OXYGEN SATURATION: 98 % | HEART RATE: 90 BPM | SYSTOLIC BLOOD PRESSURE: 128 MMHG | RESPIRATION RATE: 16 BRPM | DIASTOLIC BLOOD PRESSURE: 86 MMHG | BODY MASS INDEX: 25.13 KG/M2 | TEMPERATURE: 97.9 F | WEIGHT: 156.4 LBS | HEIGHT: 66 IN

## 2020-09-18 PROCEDURE — 99395 PREV VISIT EST AGE 18-39: CPT | Performed by: INTERNAL MEDICINE

## 2020-09-18 PROCEDURE — 97035 APP MDLTY 1+ULTRASOUND EA 15: CPT

## 2020-09-18 PROCEDURE — 97110 THERAPEUTIC EXERCISES: CPT

## 2020-09-18 PROCEDURE — 97112 NEUROMUSCULAR REEDUCATION: CPT

## 2020-09-18 PROCEDURE — 97140 MANUAL THERAPY 1/> REGIONS: CPT

## 2020-09-18 ASSESSMENT — ENCOUNTER SYMPTOMS
CONSTIPATION: 1
COUGH: 0
ANAL BLEEDING: 0
CHOKING: 0
EYE PAIN: 0
TROUBLE SWALLOWING: 0
WHEEZING: 0
SINUS PAIN: 0
ABDOMINAL DISTENTION: 0
CHEST TIGHTNESS: 0
FACIAL SWELLING: 0
RHINORRHEA: 0
EYE ITCHING: 0
VOICE CHANGE: 0
NAUSEA: 0
SHORTNESS OF BREATH: 0
ABDOMINAL PAIN: 0
BLOOD IN STOOL: 0
VOMITING: 0
BACK PAIN: 0
SORE THROAT: 0
EYE DISCHARGE: 0
SINUS PRESSURE: 0
PHOTOPHOBIA: 0
RECTAL PAIN: 0
DIARRHEA: 0
EYE REDNESS: 0
APNEA: 0

## 2020-09-18 NOTE — FLOWSHEET NOTE
[facility] [address], [address]    [place], [place]       Physical Therapy Daily Treatment Note  Date:  2020    Patient Name:  Fartun Vega    :  1995  MRN: [id number]  Restrictions/Precautions:    Medical/Treatment Diagnosis Information:  · Diagnosis: M76.60 (ICD-10-CM) - Achilles tendon pain  · Treatment Diagnosis: M25.571 Pain in Right Ankle  Insurance/Certification information:  PT Insurance Information: Caro  Physician Information:  Referring Practitioner: Hansa Rai MD  Has the plan of care been signed (Y/N):        []  Yes  [x]  No     Date of Patient follow up with Physician:       Is this a Progress Report:     []  Yes  [x]  No        If Yes:  Date Range for reporting period:  Beginning  Ending    Progress report will be due (10 Rx or 30 days whichever is less):        Recertification will be due (POC Duration  / 90 days whichever is less):          Visit # Insurance Allowable Auth Required   4   60 []  Yes [x]  No        Functional Scale: LEFS: 47.5% deficit   Date assessed: 20       Latex Allergy:  [x]NO      []YES  Preferred Language for Healthcare:   [x]English       []other:      Pain level:  0/10     SUBJECTIVE:   Patient states that his achilles is feeling better. He states he was sore last session but the soreness went away quickly.      OBJECTIVE:   ROM   PROM AROM Comments    Left Right Left Right    Dorsiflexion   0 Lacking 5    Plantarflexion   45 25    Inversion   45 25    Eversion   1 1                      Strength   Left Right Comments   Dorsiflexion 5 5    Plantarflexion 5 4+    Inversion 5 5    Eversion 5 4+        Reflexes/Sensation:    [x]Dermatomes/Myotomes intact    [x]Reflexes equal and normal bilaterally   []Other:    Joint mobility:    [x]Normal    []Hypo   []Hyper    Palpation: TTP along R achilles tendon; mild edema present      Functional Mobility/Transfers: Independent with ADL's and self care; unable to walk and run; unable to work out; pain and difficulty with stairs and squatting   9/8    Posture: WNL  9/8    Gait: (include devices/WB status) Antalgic gait present with walking boot  9/8                       [x] Patient history, allergies, meds reviewed. Medical chart reviewed. See intake form. 9/8    Review Of Systems (ROS):   [x]Performed Review of systems (Integumentary, CardioPulmonary, Neurological) by intake and observation. Intake form has been scanned into medical record. Patient has been instructed to contact their primary care physician regarding ROS issues if not already being addressed at this time.   9/8      RESTRICTIONS/PRECAUTIONS: None    Exercises/Interventions:     Exercise/Equipment Resistance/Repetitions Other comments   ROM     ABC'S     BAPS     Bottle Roll     Inversion/Eversion     Ankle Pumps 30x    Toe Curls     Rocks     Towels          Stretching     Circles 30x CW and CCW    Toe Extension      Towel Pull 1 30 sec x 5    Towel Pull 2     ERMI     Incline Stretch     Pro-Stretch     Hamstring 30 sec x 5 Start 9/11   Stair Stretch     Calf 1 (standing gastroc) 30 sec x 5    Calf 2 (standing soleus) 30 sec x 5         Isometrics     Dorsiflexion 10 sec x 10 Start 9/11   Plantarflexion 10 sec x 10 Start 9/11   Inversion 10 sec x 10 Start 9/11   Eversion 10 sec x 10 Start 9/11        PRE's     Dorsiflexion 3 x 10; yellow tband Start 9/18   Plantarflexion 3 x 10; yellow tband Start 9/18   Inversion     Eversion     Heel walk     Toe walk     SLR     Calf Raises     Step Up     Knee Extension     Hamstring Curls     Leg Press          Balance:     Rocker Board     BOSU     SLS     Aeromat     Foam Roll     Plyoback     Tandem Stance     Biodex          Bike     Treadmill          Manual interventions     IASTM posterior R calf and achilles tendon  15 min Start  9/8       Therapeutic Exercise and NMR EXR  [x] (06975) Provided verbal/tactile cueing for activities related to strengthening, flexibility, endurance, ROM for improvements in LE, proximal hip, and core control with self care, mobility, lifting, ambulation.  [] (29671) Provided verbal/tactile cueing for activities related to improving balance, coordination, kinesthetic sense, posture, motor skill, proprioception  to assist with LE, proximal hip, and core control in self care, mobility, lifting, ambulation and eccentric single leg control. NMR and Therapeutic Activities:    [] (51313 or 15071) Provided verbal/tactile cueing for activities related to improving balance, coordination, kinesthetic sense, posture, motor skill, proprioception and motor activation to allow for proper function of core, proximal hip and LE with self care and ADLs  [] (44126) Gait Re-education- Provided training and instruction to the patient for proper LE, core and proximal hip recruitment and positioning and eccentric body weight control with ambulation re-education including up and down stairs     Home Exercise Program:    [x] (88358) Reviewed/Progressed HEP activities related to strengthening, flexibility, endurance, ROM of core, proximal hip and LE for functional self-care, mobility, lifting and ambulation/stair navigation   [] (59879)Reviewed/Progressed HEP activities related to improving balance, coordination, kinesthetic sense, posture, motor skill, proprioception of core, proximal hip and LE for self care, mobility, lifting, and ambulation/stair navigation     Access Code: [email or web address]   URL: [email or web address]   Date: 09/08/2020   Prepared by: Darius Little   Manual Treatments:  PROM / STM / Oscillations-Mobs:  G-I, II, III, IV (PA's, Inf., Post.)  [] (62024) Provided manual therapy to mobilize LE, proximal hip and/or LS spine soft tissue/joints for the purpose of modulating pain, promoting relaxation,  increasing ROM, reducing/eliminating soft tissue swelling/inflammation/restriction, improving soft report returning to running for 1 mile or longer with no increase in pain. [] Progressing: [] Met: [] Not Met: [] Adjusted     Overall Progression Towards Functional goals/ Treatment Progress Update:  [] Patient is progressing as expected towards functional goals listed. [] Progression is slowed due to complexities/Impairments listed. [] Progression has been slowed due to co-morbidities. [x] Plan just implemented, too soon to assess goals progression <30days   [] Goals require adjustment due to lack of progress  [] Patient is not progressing as expected and requires additional follow up with physician  [] Other    Prognosis for POC: [x] Good [] Fair  [] Poor      Patient requires continued skilled intervention: [x] Yes  [] No    Treatment/Activity Tolerance:  [x] Patient able to complete treatment  [] Patient limited by fatigue  [] Patient limited by pain     [] Patient limited by other medical complications  [x] Other: 9/18 Patient tolerated treatment well this session. HEP provided and reviewed with patient. Reported feeling better at end of session. Continue to progress as tolerated. Patient Education:                  PLAN: See eval  [x] Continue per plan of care [] Alter current plan (see comments above)  [] Plan of care initiated [] Hold pending MD visit [] Discharge      Electronically signed by:  Ciera Miranda PT, DPT    Note: If patient does not return for scheduled/ recommended follow up visits, this note will serve as a discharge from care along with most recent update on progress.

## 2020-09-18 NOTE — PATIENT INSTRUCTIONS
1. Annual physical exam  - Comprehensive Metabolic Panel; Future  - Lipid Panel; Future  - CBC Auto Differential; Future  - Urinalysis; Future  -Tdap done on 8/28/19  - Patient will have flu shot done at a later date  - Regular aerobic exercise    2. Constipation, unspecified constipation type  - high fiber diet  -over the counter stool softener or laxative as needed    3. Gas bloat syndrome  -over the counter Gas-X or Phazyme        Patient Education        Constipation: Care Instructions  Your Care Instructions     Constipation means that you have a hard time passing stools (bowel movements). People pass stools from 3 times a day to once every 3 days. What is normal for you may be different. Constipation may occur with pain in the rectum and cramping. The pain may get worse when you try to pass stools. Sometimes there are small amounts of bright red blood on toilet paper or the surface of stools. This is because of enlarged veins near the rectum (hemorrhoids). A few changes in your diet and lifestyle may help you avoid ongoing constipation. Your doctor may also prescribe medicine to help loosen your stool. Some medicines can cause constipation. These include pain medicines and antidepressants. Tell your doctor about all the medicines you take. Your doctor may want to make a medicine change to ease your symptoms. Follow-up care is a key part of your treatment and safety. Be sure to make and go to all appointments, and call your doctor if you are having problems. It's also a good idea to know your test results and keep a list of the medicines you take. How can you care for yourself at home? · Drink plenty of fluids, enough so that your urine is light yellow or clear like water. If you have kidney, heart, or liver disease and have to limit fluids, talk with your doctor before you increase the amount of fluids you drink. · Include high-fiber foods in your diet each day.  These include fruits, vegetables, beans, and bloating can be uncomfortable and embarrassing problems. All people pass gas, but some people produce more gas than others, sometimes enough to cause distress. It is normal to pass gas from 6 to 20 times per day. Excess gas usually is not caused by a serious health problem. Gas and bloating usually are caused by something you eat or drink, including some food supplements and medicines. Gas and bloating are usually harmless and go away without treatment. However, changing your diet can help end the problem. Some over-the-counter medicines can help prevent gas and relieve bloating. Follow-up care is a key part of your treatment and safety. Be sure to make and go to all appointments, and call your doctor if you are having problems. It's also a good idea to know your test results and keep a list of the medicines you take. How can you care for yourself at home? · Keep a food diary if you think a food gives you gas. Write down what you eat or drink. Also record when you get gas. If you notice that a food seems to cause your gas each time, avoid it and see if the gas goes away. Examples of foods that cause gas include:  ? Fried and fatty foods. ? Beans. ? Vegetables such as artichokes, asparagus, broccoli, brussels sprouts, cabbage, cauliflower, cucumbers, green peppers, onions, peas, radishes, and raw potatoes. ? Fruits such as apricots, bananas, melons, peaches, pears, prunes, and raw apples. ? Wheat and wheat bran. · Soak dry beans in water overnight, then dump the water and cook the soaked beans in new water. This can help prevent gas and bloating. · If you have problems with lactose, avoid dairy products such as milk and cheese. · Try not to swallow air. Do not drink through a straw, gulp your food, or chew gum. · Take an over-the-counter medicine. Read and follow all instructions on the label. ? Food enzymes, such as Beano, can be added to gas-producing foods to prevent gas. ?  Antacids, such as Maalox Anti-Gas and Mylanta Gas, can relieve bloating by making you burp. Be careful when you take over-the-counter antacid medicines. Many of these medicines have aspirin in them. Read the label to make sure that you are not taking more than the recommended dose. Too much aspirin can be harmful. ? Activated charcoal tablets, such as CharcoCaps, may decrease odor from gas you pass. ? If you have problems with lactose, you can take medicines such as Dairy Ease and Lactaid with dairy products to prevent gas and bloating. · Get some exercise regularly. When should you call for help? [id number] anytime you think you may need emergency care. For example, call if:  · You have gas and signs of a heart attack, such as:  ? Chest pain or pressure. ? Sweating. ? Shortness of breath. ? Nausea or vomiting. ? Pain that spreads from the chest to the neck, jaw, or one or both shoulders or arms. ? Dizziness or lightheadedness. ? A fast or uneven pulse. After calling 911, chew 1 adult-strength aspirin. Wait for an ambulance. Do not try to drive yourself. Call your doctor now or seek immediate medical care if:  · You have severe belly pain. · You have blood in your stool. Watch closely for changes in your health, and be sure to contact your doctor if:  · You have blood or pus in your urine. · Your urine is cloudy or smells bad. · You are burping and have trouble swallowing. · You feel bloated and have swelling in your belly. · You do not get better as expected. Where can you learn more? Go to https://CogniapeKudarom.ThermaSource. org and sign in to your Modusly account. Enter Q239 in the [facility] box to learn more about \"Gas and Bloating: Care Instructions. \"     If you do not have an account, please click on the \"Sign Up Now\" link. Current as of: June 26, 2019               Content Version: 12.5  © 6094-8939 Healthwise, Incorporated. Care instructions adapted under license by [facility] ([facility]).  If you have questions about a medical condition or this instruction, always ask your healthcare professional. [address] any warranty or liability for your use of this information. Patient Education        Well Visit, Ages 25 to 48: Care Instructions  Your Care Instructions     Physical exams can help you stay healthy. Your doctor has checked your overall health and may have suggested ways to take good care of yourself. He or she also may have recommended tests. At home, you can help prevent illness with healthy eating, regular exercise, and other steps. Follow-up care is a key part of your treatment and safety. Be sure to make and go to all appointments, and call your doctor if you are having problems. It's also a good idea to know your test results and keep a list of the medicines you take. How can you care for yourself at home? · Reach and stay at a healthy weight. This will lower your risk for many problems, such as obesity, diabetes, heart disease, and high blood pressure. · Get at least 30 minutes of physical activity on most days of the week. Walking is a good choice. You also may want to do other activities, such as running, swimming, cycling, or playing tennis or team sports. Discuss any changes in your exercise program with your doctor. · Do not smoke or allow others to smoke around you. If you need help quitting, talk to your doctor about stop-smoking programs and medicines. These can increase your chances of quitting for good. · Talk to your doctor about whether you have any risk factors for sexually transmitted infections (STIs). Having one sex partner (who does not have STIs and does not have sex with anyone else) is a good way to avoid these infections. · Use birth control if you do not want to have children at this time. Talk with your doctor about the choices available and what might be best for you. · Protect your skin from too much sun.  When you're outdoors from 10 a.m. to 4 p.m., stay in the shade or cover up with clothing and a hat with a wide brim. Wear sunglasses that block UV rays. Even when it's cloudy, put broad-spectrum sunscreen (SPF 30 or higher) on any exposed skin. · See a dentist one or two times a year for checkups and to have your teeth cleaned. · Wear a seat belt in the car. Follow your doctor's advice about when to have certain tests. These tests can spot problems early. For everyone  · Cholesterol. Have the fat (cholesterol) in your blood tested after age 21. Your doctor will tell you how often to have this done based on your age, family history, or other things that can increase your risk for heart disease. · Blood pressure. Have your blood pressure checked during a routine doctor visit. Your doctor will tell you how often to check your blood pressure based on your age, your blood pressure results, and other factors. · Vision. Talk with your doctor about how often to have a glaucoma test.  · Diabetes. Ask your doctor whether you should have tests for diabetes. · Colon cancer. Your risk for colorectal cancer gets higher as you get older. Some experts say that adults should start regular screening at age 48 and stop at age 76. Others say to start before age 48 or continue after age 76. Talk with your doctor about your risk and when to start and stop screening. For women  · Breast exam and mammogram. Talk to your doctor about when you should have a clinical breast exam and a mammogram. Medical experts differ on whether and how often women under 50 should have these tests. Your doctor can help you decide what is right for you. · Cervical cancer screening test and pelvic exam. Begin with a Pap test at age 24. The test often is part of a pelvic exam. Starting at age 27, you may choose to have a Pap test, an HPV test, or both tests at the same time (called co-testing). Talk with your doctor about how often to have testing.   · Tests for sexually transmitted infections (STIs). Ask whether you should have tests for STIs. You may be at risk if you have sex with more than one person, especially if your partners do not wear condoms. For men  · Tests for sexually transmitted infections (STIs). Ask whether you should have tests for STIs. You may be at risk if you have sex with more than one person, especially if you do not wear a condom. · Testicular cancer exam. Ask your doctor whether you should check your testicles regularly. · Prostate exam. Talk to your doctor about whether you should have a blood test (called a PSA test) for prostate cancer. Experts differ on whether and when men should have this test. Some experts suggest it if you are older than 39 and are -American or have a father or brother who got prostate cancer when he was younger than 72. When should you call for help? Watch closely for changes in your health, and be sure to contact your doctor if you have any problems or symptoms that concern you. Where can you learn more? Go to https://Amplify.LAchristiano.healthWellframe. org and sign in to your FPSI account. Enter P072 in the [employer] box to learn more about \"Well Visit, Ages 25 to 48: Care Instructions. \"     If you do not have an account, please click on the \"Sign Up Now\" link. Current as of: August 22, 2019               Content Version: 12.5  © 4882-2859 Healthwise, Incorporated. Care instructions adapted under license by [facility] ([facility]). If you have questions about a medical condition or this instruction, always ask your healthcare professional. [place] [address] any warranty or liability for your use of this information.

## 2020-09-18 NOTE — PROGRESS NOTES
weakness, light-headedness, numbness and headaches. Hematological: Negative for adenopathy. Does not bruise/bleed easily. Psychiatric/Behavioral: Positive for decreased concentration. Negative for dysphoric mood and sleep disturbance. The patient is nervous/anxious (patient is not on medication). Pt sees Psychiatry for Anxiety and ADD. Past Medical History:   Diagnosis Date    ADHD     Anxiety     Depression        Past Surgical History:   Procedure Laterality Date    SHOULDER SURGERY Right 12/2014       Outpatient Medications Marked as Taking for the 9/18/20 encounter (Office Visit) with Claudette Hall MD   Medication Sig Dispense Refill    cyclobenzaprine (FLEXERIL) 5 MG tablet TAKE 1 TABLET BY MOUTH THREE TIMES DAILY AS NEEDED FOR MUSCLE SPASMS 90 tablet 1    diclofenac sodium (VOLTAREN) 1 % GEL Apply 2 g topically 4 times daily 1 Tube 5    diclofenac (VOLTAREN) 75 MG EC tablet Take 1 tablet by mouth 2 times daily 60 tablet 3    Lisdexamfetamine Dimesylate (VYVANSE) 60 MG CAPS Take 1 capsule by mouth daily . No Known Allergies    Social History     Tobacco Use    Smoking status: Never Smoker    Smokeless tobacco: Never Used   Substance Use Topics    Alcohol use:  Yes     Alcohol/week: 4.0 standard drinks     Types: 4 Cans of beer per week     Comment: Social       Family History   Problem Relation Age of Onset    Lung Cancer Mother 47    Lung Cancer Maternal Grandmother 79    Heart Disease Neg Hx     Hypertension Neg Hx     Diabetes Neg Hx        Immunization History   Administered Date(s) Administered    DTaP (Infanrix) 1995, 1995, 01/04/1996, 01/06/1997, 08/23/1999    HPV Bivalent (Cervarix) 1995, 1995, 04/22/1996, 07/20/2012, 07/11/2013    HPV Quadrivalent (Gardasil) 12/08/2015    Hepatitis B Ped/Adol (Engerix-B, Recombivax HB) 1995, 1995, 04/22/1996    Hib PRP-OMP (PedvaxHIB) 1995, 1995, 01/04/1996, 10/25/1996  Hib, unspecified 04/22/1996    Influenza A (W4P1-61) Vaccine PF IM 10/13/2009    Influenza Vaccine, unspecified formulation 10/17/2002, 10/21/2003, 11/05/2005, 10/20/2006, 11/09/2007, 11/15/2008, 10/27/2009, 11/01/2010, 10/12/2011, 11/13/2012, 10/18/2013, 10/24/2014, 10/16/2015    Influenza Virus Vaccine 10/17/2002, 10/21/2003, 11/05/2005, 10/20/2006, 11/09/2007, 11/15/2008, 10/27/2009, 11/01/2010, 10/12/2011, 11/13/2012    MMR 10/25/1996, 08/23/1999    Meningococcal MCV4P (Menactra) 07/14/2011, 05/31/2017    PPD Test 04/22/1996    Polio IPV (IPOL) 08/23/1999    Polio OPV 1995, 1995, 01/06/1997    Tdap (Boostrix, Adacel) 05/31/2007, 08/28/2019    Varicella (Varivax) 07/12/1996, 10/25/1996, 05/31/2007       Vitals:    09/18/20 0824   BP: 128/86   Pulse: 90   Resp: 16   Temp: 97.9 °F (36.6 °C)   SpO2: 98%   Weight: 156 lb 6.4 oz (70.9 kg)   Height: 5' 6\" (1.676 m)     Body mass index is 25.24 kg/m². Physical Exam  Constitutional:       General: He is not in acute distress. Appearance: He is well-developed. HENT:      Head: Normocephalic and atraumatic. Right Ear: Hearing, tympanic membrane and ear canal normal.      Left Ear: Hearing, tympanic membrane and ear canal normal.      Nose: Nose normal.      Mouth/Throat:      Pharynx: Oropharynx is clear. Uvula midline. Eyes:      General: Lids are normal.      Extraocular Movements: Extraocular movements intact. Conjunctiva/sclera: Conjunctivae normal.      Pupils: Pupils are equal, round, and reactive to light. Neck:      Musculoskeletal: Neck supple. Thyroid: No thyromegaly. Vascular: No carotid bruit. Cardiovascular:      Rate and Rhythm: Normal rate and regular rhythm. Heart sounds: Normal heart sounds, S1 normal and S2 normal. No murmur. No friction rub. No gallop. Pulmonary:      Effort: Pulmonary effort is normal. No respiratory distress. Breath sounds: Normal breath sounds.  No wheezing, rhonchi or rales. Abdominal:      General: Bowel sounds are normal. There is no distension. Palpations: Abdomen is soft. Tenderness: There is no abdominal tenderness. There is no guarding or rebound. Hernia: There is no hernia in the left inguinal area. Genitourinary:     Penis: Normal.       Scrotum/Testes: Normal.   Musculoskeletal: Normal range of motion. General: No tenderness. Right shoulder: He exhibits normal range of motion and no tenderness. Left shoulder: He exhibits normal range of motion and no tenderness. Right elbow: He exhibits normal range of motion and no swelling. No tenderness found. Left elbow: He exhibits normal range of motion and no swelling. No tenderness found. Right wrist: He exhibits no tenderness and no swelling. Left wrist: He exhibits no tenderness and no swelling. Right hip: He exhibits normal range of motion and no tenderness. Left hip: He exhibits normal range of motion and no tenderness. Right knee: He exhibits normal range of motion and no swelling. No tenderness found. Left knee: He exhibits normal range of motion and no swelling. No tenderness found. Right ankle: He exhibits normal range of motion and no swelling. No tenderness. Left ankle: He exhibits normal range of motion and no swelling. No tenderness. Cervical back: He exhibits normal range of motion, no tenderness and no spasm. Thoracic back: He exhibits no tenderness and no spasm. Lumbar back: He exhibits normal range of motion, no tenderness and no spasm. Right upper arm: He exhibits no tenderness. Left upper arm: He exhibits no tenderness. Right forearm: He exhibits no tenderness. Left forearm: He exhibits no tenderness. Right hand: He exhibits no tenderness and no swelling. Left hand: He exhibits no tenderness and no swelling. Right lower leg: He exhibits no tenderness. No edema. Left lower leg: He exhibits no tenderness. No edema. Right foot: No tenderness. Left foot: No tenderness. Lymphadenopathy:      Head:      Right side of head: No submandibular adenopathy. Left side of head: No submandibular adenopathy. Cervical: No cervical adenopathy. Skin:     General: Skin is warm and dry. Findings: No bruising, erythema or rash. Neurological:      Mental Status: He is alert and oriented to person, place, and time. Cranial Nerves: No cranial nerve deficit. Gait: Gait normal.      Deep Tendon Reflexes: Reflexes are normal and symmetric. Babinski sign absent on the right side. Babinski sign absent on the left side. Psychiatric:         Mood and Affect: Mood normal.         Speech: Speech normal.               No results found for this visit on 09/18/20. Assessment/Plan     1. Annual physical exam  - Comprehensive Metabolic Panel; Future  - Lipid Panel; Future  - CBC Auto Differential; Future  - Urinalysis; Future  -Tdap done on 8/28/19  - Patient will have flu shot done at a later date  - Regular aerobic exercise    2. Constipation, unspecified constipation type  - high fiber diet  -over the counter stool softener or laxative as needed    3. Gas bloat syndrome  -over the counter Gas-X or Phazyme      Discussed medications with patient, who voiced understanding of their use and indications. All questions answered.     Return in about 1 year (around 9/18/2021) for annual physical exam.

## 2020-09-24 ENCOUNTER — HOSPITAL ENCOUNTER (OUTPATIENT)
Dept: OCCUPATIONAL THERAPY | Age: 25
Setting detail: THERAPIES SERIES
Discharge: HOME | End: 2020-09-24
Payer: COMMERCIAL

## 2020-09-24 PROBLEM — K92.89 GAS BLOAT SYNDROME: Status: ACTIVE | Noted: 2020-09-24

## 2020-09-24 NOTE — FLOWSHEET NOTE
[facility], [facility]  [address], [address] [facility], [address] [facility]  Phone: [phone] Fax: [phone]      Occupational Therapy  Cancellation/No-show Note    Patient Name:  Malika Werner   :  1995   Date:  2020  Cancelled visits to date: 0  No-shows to date: 1    For today's appointment patient:  []    Cancelled  [x]    Rescheduled appointment  [x]    No-show     Reason given by patient:  []    Patient ill  []    Conflicting appointment  []    No transportation    []    Conflict with work  []    No reason given  []    Other:     Comments:      Phone call information:   [x]  Phone call made today to patient at number provided:                  [x]  Patient answered, conversation as follows:  reports he thought appt was next week  []  Patient did not answer, message left as follows:  []  Phone call not made today  []  Phone call not needed - pt contacted us to cancel and provided reason for cancellation. Electronically signed by:   Mavis Collins OTR/L, [address], [phone], [phone]

## 2020-09-25 ENCOUNTER — APPOINTMENT (OUTPATIENT)
Dept: PHYSICAL THERAPY | Age: 25
End: 2020-09-25
Payer: COMMERCIAL

## 2020-09-25 ENCOUNTER — HOSPITAL ENCOUNTER (OUTPATIENT)
Dept: PHYSICAL THERAPY | Age: 25
Setting detail: THERAPIES SERIES
Discharge: HOME | End: 2020-09-25
Payer: COMMERCIAL

## 2020-09-25 PROCEDURE — 97112 NEUROMUSCULAR REEDUCATION: CPT

## 2020-09-25 PROCEDURE — 97140 MANUAL THERAPY 1/> REGIONS: CPT

## 2020-09-25 PROCEDURE — 97035 APP MDLTY 1+ULTRASOUND EA 15: CPT

## 2020-09-25 PROCEDURE — 97110 THERAPEUTIC EXERCISES: CPT

## 2020-09-25 NOTE — FLOWSHEET NOTE
The [address] [facility], [address]    [place], [place]       Physical Therapy Daily Treatment Note  Date:  2020    Patient Name:  Emanuel Kramer    :  1995  MRN: [id number]  Restrictions/Precautions:    Medical/Treatment Diagnosis Information:  · Diagnosis: M76.60 (ICD-10-CM) - Achilles tendon pain  · Treatment Diagnosis: M25.571 Pain in Right Ankle  Insurance/Certification information:  PT Insurance Information: [place]  Physician Information:  Referring Practitioner: Tk Villalta MD  Has the plan of care been signed (Y/N):        []  Yes  [x]  No     Date of Patient follow up with Physician: prn with PCP      Is this a Progress Report:     []  Yes  [x]  No        If Yes:  Date Range for reporting period:  Beginning  Ending    Progress report will be due (10 Rx or 30 days whichever is less):        Recertification will be due (POC Duration  / 90 days whichever is less):          Visit # Insurance Allowable Auth Required   5  10/18 60 []  Yes [x]  No        Functional Scale: LEFS: 47.5% deficit   Date assessed: 20       Latex Allergy:  [x]NO      []YES  Preferred Language for Healthcare:   [x]English       []other:      Pain level:  3-4/10     SUBJECTIVE:  Pt says that he still has good days and bad days. He tried walking a couple days ago barefoot around the house but got pretty sore after just 2-3 minutes and he is pretty sure he was limping.       OBJECTIVE:   ROM   PROM AROM Comments    Left Right Left Right    Dorsiflexion   1 pre-tx, 3 post-tx Mild pain   Plantarflexion   62    Inversion   38    Eversion   12                      Strength  9/ Left Right Comments   Dorsiflexion 5 5    Plantarflexion 5 4+    Inversion 5 5    Eversion 5 4+        Reflexes/Sensation:    [x]Dermatomes/Myotomes intact    [x]Reflexes equal and normal bilaterally   []Other:    Joint mobility:    [x]Normal    []Hypo   []Hyper    Palpation: for foot 4 corners (neutral foot)   SLB 2x30\" R    Bwd walking 4x10 ft HEP 9/25   Rocker Board     BOSU     SLS     Aeromat     Foam Roll     Plyoback     Tandem Stance     Biodex          Bike     Treadmill          Manual interventions     MFR posterior R calf and achilles tendon, FLH, post tib, plantar fascia 15 min Start  9/8       Therapeutic Exercise and NMR EXR  [x] (42527) Provided verbal/tactile cueing for activities related to strengthening, flexibility, endurance, ROM for improvements in LE, proximal hip, and core control with self care, mobility, lifting, ambulation. [x] (17725) Provided verbal/tactile cueing for activities related to improving balance, coordination, kinesthetic sense, posture, motor skill, proprioception  to assist with LE, proximal hip, and core control in self care, mobility, lifting, ambulation and eccentric single leg control.      NMR and Therapeutic Activities:    [] (80946 or 89408) Provided verbal/tactile cueing for activities related to improving balance, coordination, kinesthetic sense, posture, motor skill, proprioception and motor activation to allow for proper function of core, proximal hip and LE with self care and ADLs  [] (15574) Gait Re-education- Provided training and instruction to the patient for proper LE, core and proximal hip recruitment and positioning and eccentric body weight control with ambulation re-education including up and down stairs     Home Exercise Program:    [x] (79129) Reviewed/Progressed HEP activities related to strengthening, flexibility, endurance, ROM of core, proximal hip and LE for functional self-care, mobility, lifting and ambulation/stair navigation   [] (39636)Reviewed/Progressed HEP activities related to improving balance, coordination, kinesthetic sense, posture, motor skill, proprioception of core, proximal hip and LE for self care, mobility, lifting, and ambulation/stair navigation     Access Code: [email or web address]   URL: Deficits. [] Progressing: [] Met: [] Not Met: [] Adjusted  3. Patient will demonstrate an increase in Strength to good proximal hip strength and control in LE to allow for proper functional mobility as indicated by patients Functional Deficits. [] Progressing: [] Met: [] Not Met: [] Adjusted  4. Patient will return to Independent functional activities without increased symptoms or restriction. [] Progressing: [] Met: [] Not Met: [] Adjusted  5. Patient will report returning to running for 1 mile or longer with no increase in pain. [] Progressing: [] Met: [] Not Met: [] Adjusted     Overall Progression Towards Functional goals/ Treatment Progress Update:  [] Patient is progressing as expected towards functional goals listed. [] Progression is slowed due to complexities/Impairments listed. [] Progression has been slowed due to co-morbidities. [x] Plan just implemented, too soon to assess goals progression <30days   [] Goals require adjustment due to lack of progress  [] Patient is not progressing as expected and requires additional follow up with physician  [] Other    Prognosis for POC: [x] Good [] Fair  [] Poor      Patient requires continued skilled intervention: [x] Yes  [] No    Treatment/Activity Tolerance:  [x] Patient able to complete treatment  [] Patient limited by fatigue  [] Patient limited by pain     [] Patient limited by other medical complications  [x] Other: 9/25 Pt is making progress in regaining ROM with DF still being the most limited and painful. He demonstrated being the most reactive to any active work involving the toes, particularly FHL action, that limited walking and exercise progression today. He was bale to progress ankle strengthening today though without increased pain. He also had significantly more pain with forward walking even just 3-4 steps but able to do total of 40 ft before increased pain with backward walking.        Patient Education:  Icing to control pain and swelling. Use of tennis ball for self massage in calf and foot at home. PLAN: See eval  [x] Continue per plan of care [] Alter current plan (see comments above)  [] Plan of care initiated [] Hold pending MD visit [] Discharge      Electronically signed by:  Meseret Dunn, PT, DPT, AT, OCS    Note: If patient does not return for scheduled/ recommended follow up visits, this note will serve as a discharge from care along with most recent update on progress.

## 2020-09-29 ENCOUNTER — OFFICE VISIT (OUTPATIENT)
Dept: ORTHOPEDIC SURGERY | Age: 25
End: 2020-09-29
Payer: COMMERCIAL

## 2020-09-29 ENCOUNTER — HOSPITAL ENCOUNTER (OUTPATIENT)
Dept: PHYSICAL THERAPY | Age: 25
Setting detail: THERAPIES SERIES
Discharge: HOME | End: 2020-09-29
Payer: COMMERCIAL

## 2020-09-29 VITALS — WEIGHT: 156 LBS | BODY MASS INDEX: 25.07 KG/M2 | HEIGHT: 66 IN

## 2020-09-29 PROCEDURE — 97035 APP MDLTY 1+ULTRASOUND EA 15: CPT

## 2020-09-29 PROCEDURE — 97140 MANUAL THERAPY 1/> REGIONS: CPT

## 2020-09-29 PROCEDURE — 97110 THERAPEUTIC EXERCISES: CPT

## 2020-09-29 PROCEDURE — 97112 NEUROMUSCULAR REEDUCATION: CPT

## 2020-09-29 PROCEDURE — 99213 OFFICE O/P EST LOW 20 MIN: CPT | Performed by: ORTHOPAEDIC SURGERY

## 2020-09-29 NOTE — PROGRESS NOTES
Chief Complaint:  Wrist Pain (CK RAIN WRIST TENDONITIS- LOV 2/11/2020)      History of Present of Illness: The patient returns and reports that he did get some temporary relief in hand therapy but really has not been able to completely turn the corner with pain in the dorsal aspect of both wrists after a long day at work. He is now in a walker boot for what he describes as Achilles tendinitis as well. He reports most of his pain is not early in the morning but more after a long day of working at his workstation. He has made some accommodations for ergonomics but still has discomfort simply with working. He denies any numbness or tingling. Review of Systems  Pertinent items are noted in HPI  Denies fever, chills, confusion, bowel/bladder active change. Review of systems reviewed from Patient History Form dated on 2/11/2020 and available in the patient's chart under the Media tab. Examination:  On exam today there is no sign of any widespread synovitis or prominence or joint edema. Fingers are perfused and sensate. Provocative testing for carpal tunnel syndrome is negative. There is normal motor function and range of motion of the fingers distally. There continues to be some diffuse tenderness over the fourth dorsal extensor compartment along both dorsal wrists and distal forearms. I am not able to appreciate any palpable mass, ganglion cyst, or crepitance. Radiology:     X-rays obtained and reviewed in office:  Views    Location    Impression      Orders Placed This Encounter   Procedures   Trish Chavez MD, Rheumatology, [facility]     Referral Priority:   Routine     Referral Type:   Eval and Treat     Referral Reason:   Specialty Services Required     Referred to Provider:   Arlette Cardona MD     Requested Specialty:   Rheumatology     Number of Visits Requested:   1       Impression:  Encounter Diagnosis   Name Primary?     Extensor tendinitis of hand Yes         Treatment Plan:  I discussed with the patient that I really am not finding any mechanical, anatomic, or structural abnormality. I do believe he has manifestations of some tendinitis over the extensor aspect of both wrists, but I do not have any logical interventions other than the therapy and other topical gels and bracing that we had discussed. Given that we have exhausted our options for intervention and he has now additional areas of soft tissue inflammation, I have offered him a rheumatology referral for further evaluation. I have prepared him that there is no guarantee that there would be any connecting diagnosis, but as I am at a loss to offer him additional options, I do believe it is young age at least an initial evaluation by rheumatology is prudent. I certainly do not have any specific restrictions for him, and if his diagnostic findings change down the road we can see him back on an as-needed basis. Please note that this transcription was created using voice recognition software. Any errors are unintentional and may be due to voice recognition transcription.

## 2020-09-29 NOTE — FLOWSHEET NOTE
[facility] [address] [facility] [address]    [place], [place]       Physical Therapy Daily Treatment Note  Date:  2020    Patient Name:  Ricci Walker    :  1995  MRN: [id number]  Restrictions/Precautions:    Medical/Treatment Diagnosis Information:  · Diagnosis: M76.60 (ICD-10-CM) - Achilles tendon pain  · Treatment Diagnosis: M25.571 Pain in Right Ankle  Insurance/Certification information:  PT Insurance Information: [place]  Physician Information:  Referring Practitioner: Cathy Tafoya MD  Has the plan of care been signed (Y/N):        []  Yes  [x]  No     Date of Patient follow up with Physician: prn with PCP      Is this a Progress Report:     []  Yes  [x]  No        If Yes:  Date Range for reporting period:  Beginning  Ending    Progress report will be due (10 Rx or 30 days whichever is less):        Recertification will be due (POC Duration  / 90 days whichever is less):          Visit # Insurance Allowable Auth Required   6   60 []  Yes [x]  No        Functional Scale: LEFS: 47.5% deficit   Date assessed: 20       Latex Allergy:  [x]NO      []YES  Preferred Language for Healthcare:   [x]English       []other:      Pain level:  3-4/10     SUBJECTIVE:   patient states that he has an appointment with a rheumatologist next week. He states that his achilles is doing better. He states that did some backwards walking.        OBJECTIVE:   ROM   PROM AROM Comments    Left Right Left Right    Dorsiflexion   1 pre-tx, 3 post-tx Mild pain   Plantarflexion   62    Inversion   38    Eversion   12                      Strength  9/8 Left Right Comments   Dorsiflexion 5 5    Plantarflexion 5 4+    Inversion 5 5    Eversion 5 4+        Reflexes/Sensation:    [x]Dermatomes/Myotomes intact    [x]Reflexes equal and normal bilaterally   []Other:    Joint mobility:    [x]Normal    []Hypo   []Hyper    Palpation: TTP along R achilles tendon, FHL amd post tib in talar tunnel and into plantar fascia; mild edema present  9/25    Functional Mobility/Transfers: Independent with ADL's and self care; unable to walk and run; unable to work out; pain and difficulty with stairs and squatting   9/8    Posture: WNL  9/8    Gait: (include devices/WB status) very mild antalgic gait present with walking boot w dec WB time on R  9/25                       [x] Patient history, allergies, meds reviewed. Medical chart reviewed. See intake form. 9/8    Review Of Systems (ROS):   [x]Performed Review of systems (Integumentary, CardioPulmonary, Neurological) by intake and observation. Intake form has been scanned into medical record. Patient has been instructed to contact their primary care physician regarding ROS issues if not already being addressed at this time.   9/8      RESTRICTIONS/PRECAUTIONS: None    Exercises/Interventions:     Exercise/Equipment Resistance/Repetitions Other comments   ROM     ABC'S     BAPS     Bottle Roll     Inversion/Eversion     Ankle Pumps 30x    Toe Curls     Rocks     Towels          Stretching     Circles 30x CW and CCW    Toe Extension      Towel Pull 1 30 sec x 5 Gastroc    Towel Pull 2 30 sec x 5 Soleus    ERMI     Incline Stretch     Pro-Stretch     Hamstring 30 sec x 5 Start 9/11   Stair Stretch     Calf 1 (standing gastroc)    Calf 2 (standing soleus)         Isometrics     Dorsiflexion Start 9/11   Plantarflexion Start 9/11   Inversion Start 9/11   Eversion Start 9/11        PRE's     Dorsiflexion 3 x 10; yellow tband Start 9/18   Plantarflexion 3 x 10; yellow tband Start 9/18   Inversion 3 x 10, yellow band Added HEP 9/25   Eversion 3 x 10, yellow bend Added HEP 9/25   Heel walk     Toe walk     SLR     Calf Raises     Step Up     Knee Extension     Hamstring Curls     Leg Press     Toe curls 1x20 held due to pain    Toes ext [id number] due to pain    Seated heel raises 3 x 10 Start 9/29        Balance:     SLB 2x30\" R Rocker Board 30 sec x 3 Start 9/29   BOSU     SLS     Aeromat     Foam Roll     Plyoback     Tandem Stance     Biodex     Cone walking 5 min Start 9/29   Bike     Treadmill          Manual interventions     MFR posterior R calf and achilles tendon, FLH, post tib, plantar fascia 15 min Start  9/8       Therapeutic Exercise and NMR EXR  [x] (21380) Provided verbal/tactile cueing for activities related to strengthening, flexibility, endurance, ROM for improvements in LE, proximal hip, and core control with self care, mobility, lifting, ambulation. [x] (31040) Provided verbal/tactile cueing for activities related to improving balance, coordination, kinesthetic sense, posture, motor skill, proprioception  to assist with LE, proximal hip, and core control in self care, mobility, lifting, ambulation and eccentric single leg control. NMR and Therapeutic Activities:    [] (14956 or 05403) Provided verbal/tactile cueing for activities related to improving balance, coordination, kinesthetic sense, posture, motor skill, proprioception and motor activation to allow for proper function of core, proximal hip and LE with self care and ADLs  [] (19069) Gait Re-education- Provided training and instruction to the patient for proper LE, core and proximal hip recruitment and positioning and eccentric body weight control with ambulation re-education including up and down stairs     Home Exercise Program:    [x] (71341) Reviewed/Progressed HEP activities related to strengthening, flexibility, endurance, ROM of core, proximal hip and LE for functional self-care, mobility, lifting and ambulation/stair navigation   [] (18790)Reviewed/Progressed HEP activities related to improving balance, coordination, kinesthetic sense, posture, motor skill, proprioception of core, proximal hip and LE for self care, mobility, lifting, and ambulation/stair navigation     Access Code: [email or web address]   URL: [email or web address]   Date: 09/08/2020 Prepared by: Ruby Funez   Manual Treatments:  PROM / STM / Oscillations-Mobs:  G-I, II, III, IV (PA's, Inf., Post.)  [x] (38280) Provided manual therapy to mobilize LE, proximal hip and/or LS spine soft tissue/joints for the purpose of modulating pain, promoting relaxation,  increasing ROM, reducing/eliminating soft tissue swelling/inflammation/restriction, improving soft tissue extensibility and allowing for proper ROM for normal function with self care, mobility, lifting and ambulation. Modalities:  Cont US 1.4W/cm2, 3MHz at R Achilles 12 min  Charges:  Timed Code Treatment Minutes: [address]   Total Treatment Minutes: 67     [] EVAL (LOW) 67981 (typically 20 minutes face-to-face)  [] EVAL (MOD) 03960 (typically 30 minutes face-to-face)  [] EVAL (HIGH) 87105 (typically 45 minutes face-to-face)  [] RE-EVAL     [x] OG(82239) x 1     [] IONTO  [x] NMR (87155) x 1    [] VASO  [x] Manual (94246) x 1     [x] Other: US 12 min  9/29  [] TA x      [] Mech Traction (58335)  [] ES(attended) (48570)      [] ES (un) (99898):     GOALS:   Patient stated goal:  Reduce pain; return to walking and running  [] Progressing: [] Met: [] Not Met: [] Adjusted    Therapist goals for Patient:   Short Term Goals: To be achieved in: 2 weeks  1. Independent in HEP and progression per patient tolerance, in order to prevent re-injury. [] Progressing: [] Met: [] Not Met: [] Adjusted   2. Patient will have a decrease in pain to facilitate improvement in movement, function, and ADLs as indicated by Functional Deficits. [] Progressing: [] Met: [] Not Met: [] Adjusted    Long Term Goals: To be achieved in: 4 weeks  1. Disability index score of 20% or less for the LEFS to assist with reaching prior level of function. [] Progressing: [] Met: [] Not Met: [] Adjusted  2. Patient will demonstrate increased AROM to WNL to allow for proper joint functioning as indicated by patients Functional Deficits.     [] Progressing: [] Met: [] Not this note will serve as a discharge from care along with most recent update on progress.

## 2020-10-01 ENCOUNTER — APPOINTMENT (OUTPATIENT)
Dept: OCCUPATIONAL THERAPY | Age: 25
End: 2020-10-01
Payer: COMMERCIAL

## 2020-10-02 ENCOUNTER — HOSPITAL ENCOUNTER (OUTPATIENT)
Dept: PHYSICAL THERAPY | Age: 25
Setting detail: THERAPIES SERIES
Discharge: HOME | End: 2020-10-02
Payer: COMMERCIAL

## 2020-10-02 PROCEDURE — 97140 MANUAL THERAPY 1/> REGIONS: CPT

## 2020-10-02 PROCEDURE — 97112 NEUROMUSCULAR REEDUCATION: CPT

## 2020-10-02 PROCEDURE — 97110 THERAPEUTIC EXERCISES: CPT

## 2020-10-02 NOTE — FLOWSHEET NOTE
[facility] [address], [address]    [place], [place]       Physical Therapy Daily Treatment Note  Date:  10/2/2020    Patient Name:  Jairo Hardy    :  1995  MRN: [id number]  Restrictions/Precautions:    Medical/Treatment Diagnosis Information:  · Diagnosis: M76.60 (ICD-10-CM) - Achilles tendon pain  · Treatment Diagnosis: M25.571 Pain in Right Ankle  Insurance/Certification information:  PT Insurance Information: [employer]  Physician Information:  Referring Practitioner: Justin Aguilera MD  Has the plan of care been signed (Y/N):        []  Yes  [x]  No     Date of Patient follow up with Physician: prn with PCP      Is this a Progress Report:     []  Yes  [x]  No        If Yes:  Date Range for reporting period:  Beginning  Ending    Progress report will be due (10 Rx or 30 days whichever is less):        Recertification will be due (POC Duration  / 90 days whichever is less):          Visit # Insurance Allowable Auth Required   7  10/2 60 []  Yes [x]  No        Functional Scale: LEFS: 47.5% deficit   Date assessed: 20       Latex Allergy:  [x]NO      []YES  Preferred Language for Healthcare:   [x]English       []other:      Pain level:  -2/10 10/2    SUBJECTIVE:  10/2 Patient states that he felt fine after his last session but was sore on Wednesday. He states that he tried to walk around without the boot and got a little sore. He states that it feels fine today.       OBJECTIVE:   ROM   PROM AROM Comments    Left Right Left Right    Dorsiflexion   1 pre-tx, 3 post-tx Mild pain   Plantarflexion   62    Inversion   38    Eversion   12                      Strength  9/8 Left Right Comments   Dorsiflexion 5 5    Plantarflexion 5 4+    Inversion 5 5    Eversion 5 4+        Reflexes/Sensation:    [x]Dermatomes/Myotomes intact    [x]Reflexes equal and normal bilaterally   []Other:    Joint mobility: 9/8   [x]Normal    []Hypo   []Hyper    Palpation: TTP along R achilles tendon, FHL amd post tib in talar tunnel and into plantar fascia; mild edema present  9/25    Functional Mobility/Transfers: Independent with ADL's and self care; unable to walk and run; unable to work out; pain and difficulty with stairs and squatting   9/8    Posture: WNL  9/8    Gait: (include devices/WB status) very mild antalgic gait present with walking boot w dec WB time on R  9/25                       [x] Patient history, allergies, meds reviewed. Medical chart reviewed. See intake form. 9/8    Review Of Systems (ROS):   [x]Performed Review of systems (Integumentary, CardioPulmonary, Neurological) by intake and observation. Intake form has been scanned into medical record. Patient has been instructed to contact their primary care physician regarding ROS issues if not already being addressed at this time.   9/8      RESTRICTIONS/PRECAUTIONS: None    Exercises/Interventions:     Exercise/Equipment Resistance/Repetitions Other comments   ROM     ABC'S     BAPS     Bottle Roll     Inversion/Eversion     Ankle Pumps 30x    Toe Curls     Rocks     Towels          Stretching     Circles 30x CW and CCW    Toe Extension      Towel Pull 1 30 sec x 5 Gastroc    Towel Pull 2 30 sec x 5 Soleus    ERMI     Incline Stretch     Pro-Stretch     Hamstring 30 sec x 5 Start 9/11   Stair Stretch     Calf 1 (standing gastroc)    Calf 2 (standing soleus)         Isometrics     Dorsiflexion Start 9/11   Plantarflexion Start 9/11   Inversion Start 9/11   Eversion Start 9/11        PRE's     Dorsiflexion 3 x 10; yellow tband Start 9/18   Plantarflexion 3 x 10; yellow tband Start 9/18   Inversion 3 x 10, yellow band Added HEP 9/25   Eversion 3 x 10, yellow bend Added HEP 9/25   Heel walk     Toe walk     SLR     Calf Raises     Step Up     Knee Extension     Hamstring Curls     Leg Press     Toe curls 1x20 held due to pain    Toes ext [id number] due to pain Seated heel raises 3 x 10 Start 9/29        Balance:     SLB 2x30\" R    Rocker Board 30 sec x 3 Start 9/29   BOSU     SLS     Aeromat     Foam Roll     Plyoback     Tandem Stance     Biodex     Cone walking 5 min Start 9/29   Bike     Treadmill          Manual interventions     MFR posterior R calf and achilles tendon, FLH, post tib, plantar fascia 15 min Start  9/8       Therapeutic Exercise and NMR EXR  [x] (85519) Provided verbal/tactile cueing for activities related to strengthening, flexibility, endurance, ROM for improvements in LE, proximal hip, and core control with self care, mobility, lifting, ambulation. [x] (52145) Provided verbal/tactile cueing for activities related to improving balance, coordination, kinesthetic sense, posture, motor skill, proprioception  to assist with LE, proximal hip, and core control in self care, mobility, lifting, ambulation and eccentric single leg control.      NMR and Therapeutic Activities:    [x] (21694 or 96601) Provided verbal/tactile cueing for activities related to improving balance, coordination, kinesthetic sense, posture, motor skill, proprioception and motor activation to allow for proper function of core, proximal hip and LE with self care and ADLs  [] (35291) Gait Re-education- Provided training and instruction to the patient for proper LE, core and proximal hip recruitment and positioning and eccentric body weight control with ambulation re-education including up and down stairs     Home Exercise Program:    [x] (53793) Reviewed/Progressed HEP activities related to strengthening, flexibility, endurance, ROM of core, proximal hip and LE for functional self-care, mobility, lifting and ambulation/stair navigation   [] (03906)Reviewed/Progressed HEP activities related to improving balance, coordination, kinesthetic sense, posture, motor skill, proprioception of core, proximal hip and LE for self care, mobility, lifting, and ambulation/stair navigation     Access Code: [email or web address]   URL: [email or web address]   Date: 09/08/2020   Prepared by: Stevan Beebe   Manual Treatments:  PROM / STM / Oscillations-Mobs:  G-I, II, III, IV (PA's, Inf., Post.)  [x] (60352) Provided manual therapy to mobilize LE, proximal hip and/or LS spine soft tissue/joints for the purpose of modulating pain, promoting relaxation,  increasing ROM, reducing/eliminating soft tissue swelling/inflammation/restriction, improving soft tissue extensibility and allowing for proper ROM for normal function with self care, mobility, lifting and ambulation. Modalities:  Cont US 1.4W/cm2, 3MHz at R Achilles 12 min  Charges:  Timed Code Treatment Minutes: 40   Total Treatment Minutes: 45     [] EVAL (LOW) 03867 (typically 20 minutes face-to-face)  [] EVAL (MOD) 83534 (typically 30 minutes face-to-face)  [] EVAL (HIGH) 23038 (typically 45 minutes face-to-face)  [] RE-EVAL     [x] JX(57387) x 1     [] IONTO  [x] NMR (08174) x 1    [] VASO  [x] Manual (57765) x 1     [] Other: US 12 min  9/29  [] TA x      [] Mech Traction (90670)  [] ES(attended) (71672)      [] ES (un) (75610):     GOALS:   Patient stated goal:  Reduce pain; return to walking and running  [] Progressing: [] Met: [] Not Met: [] Adjusted    Therapist goals for Patient:   Short Term Goals: To be achieved in: 2 weeks  1. Independent in HEP and progression per patient tolerance, in order to prevent re-injury. [] Progressing: [] Met: [] Not Met: [] Adjusted   2. Patient will have a decrease in pain to facilitate improvement in movement, function, and ADLs as indicated by Functional Deficits. [] Progressing: [] Met: [] Not Met: [] Adjusted    Long Term Goals: To be achieved in: 4 weeks  1. Disability index score of 20% or less for the LEFS to assist with reaching prior level of function. [] Progressing: [] Met: [] Not Met: [] Adjusted  2.  Patient will demonstrate increased AROM to WNL to allow for proper joint functioning as indicated by patients Functional Deficits. [] Progressing: [] Met: [] Not Met: [] Adjusted  3. Patient will demonstrate an increase in Strength to good proximal hip strength and control in LE to allow for proper functional mobility as indicated by patients Functional Deficits. [] Progressing: [] Met: [] Not Met: [] Adjusted  4. Patient will return to Independent functional activities without increased symptoms or restriction. [] Progressing: [] Met: [] Not Met: [] Adjusted  5. Patient will report returning to running for 1 mile or longer with no increase in pain. [] Progressing: [] Met: [] Not Met: [] Adjusted     Overall Progression Towards Functional goals/ Treatment Progress Update:  [] Patient is progressing as expected towards functional goals listed. [] Progression is slowed due to complexities/Impairments listed. [] Progression has been slowed due to co-morbidities. [x] Plan just implemented, too soon to assess goals progression <30days   [] Goals require adjustment due to lack of progress  [] Patient is not progressing as expected and requires additional follow up with physician  [] Other    Prognosis for POC: [x] Good [] Fair  [] Poor      Patient requires continued skilled intervention: [x] Yes  [] No    Treatment/Activity Tolerance:  [x] Patient able to complete treatment  [] Patient limited by fatigue  [] Patient limited by pain     [] Patient limited by other medical complications  [x] Other: 10/2 Pt is slowly making progress. Discussed weaning from boot with patient. Reported feeling better at end of session. Continue to progress as tolerated. Patient Education:  Icing to control pain and swelling. Use of tennis ball for self massage in calf and foot at home.                  PLAN: See eval  [x] Continue per plan of care [] Alter current plan (see comments above)  [] Plan of care initiated [] Hold pending MD visit [] Discharge      Electronically signed by:  Henry Weldon, PT, DPT    Note: If patient does not return for scheduled/ recommended follow up visits, this note will serve as a discharge from care along with most recent update on progress.

## 2020-10-04 LAB
C-REACTIVE PROTEIN WIDE RANGE: 6.5 MG/L (ref 1–10)
EMPLOYED IN HEALTHCARE: NO
FIRST TEST: YES
HOSPITALIZED: YES
ICU: NO
PREGNANT ?: NO
RESIDES IN CONGREGATE CARE SETTING: NO
RHEUMATOID FACTOR: <10 IU/ML (ref 0–14)
SEDIMENTATION RATE, ERYTHROCYTE: 2 MM/HR (ref 0–15)
SYMPTOMATIC AS DEFINED BY THE CDC: NO

## 2020-10-05 LAB
ANION GAP SERPL CALCULATED.3IONS-SCNC: 8 MMOL/L (ref 3–16)
BASOPHILS ABSOLUTE: 38 /UL (ref 0–200)
BASOPHILS RELATIVE PERCENT: 0.4 % (ref 0–1)
BUN BLDV-MCNC: 16 MG/DL (ref 7–25)
CALCIUM SERPL-MCNC: 9.2 MG/DL (ref 8.6–10.3)
CHLORIDE BLD-SCNC: 103 MMOL/L (ref 98–110)
CO2: 27 MMOL/L (ref 21–33)
CREAT SERPL-MCNC: 1.02 MG/DL (ref 0.6–1.3)
DEVICE: NORMAL
EMPLOYED IN HEALTHCARE: NO
EOSINOPHILS ABSOLUTE: 235 /UL (ref 15–500)
EOSINOPHILS RELATIVE PERCENT: 2.5 % (ref 0–8)
FIRST TEST: NORMAL
GFR, ESTIMATED: >90 SEE NOTE.
GFR, ESTIMATED: >90 SEE NOTE.
GLUCOSE BLD-MCNC: 106 MG/DL (ref 70–100)
HCT VFR BLD CALC: 40 % (ref 38.5–50)
HEMOGLOBIN: 14 G/DL (ref 13.2–17.1)
HOSPITALIZED: YES
ICU: NO
LYMPHOCYTES ABSOLUTE: 2735 /UL (ref 850–3900)
LYMPHOCYTES RELATIVE PERCENT: 29.1 % (ref 15–45)
MCH RBC QN AUTO: 30.3 PG (ref 27–33)
MCHC RBC AUTO-ENTMCNC: 35 G/DL (ref 32–36)
MCV RBC AUTO: 86.7 FL (ref 80–100)
MONOCYTES ABSOLUTE: 649 /UL (ref 200–950)
MONOCYTES RELATIVE PERCENT: 6.9 % (ref 0–12)
NEUTROPHILS ABSOLUTE: 5743 /UL (ref 1500–7800)
NUCLEATED RED BLOOD CELLS: 0 /100 WBC (ref 0–0)
OSMOLALITY CALCULATION: 288 MOSM/KG (ref 278–305)
PDW BLD-RTO: 12.7 % (ref 11–15)
PLATELET # BLD: 199 10E3/UL (ref 140–400)
PMV BLD AUTO: 8.4 FL (ref 7.5–11.5)
POTASSIUM SERPL-SCNC: 4 MMOL/L (ref 3.5–5.3)
PREGNANT ?: NO
RBC # BLD: 4.62 10E6/UL (ref 4.2–5.8)
RESIDES IN CONGREGATE CARE SETTING: NO
SARS-COV-2: NOT DETECTED
SEGMENTED NEUTROPHILS RELATIVE PERCENT: 61.1 % (ref 40–80)
SODIUM BLD-SCNC: 138 MMOL/L (ref 133–146)
SYMPTOMATIC AS DEFINED BY THE CDC: NO
TEST ORDERED: NORMAL
WBC # BLD: 9.4 10E3/UL (ref 3.8–10.8)

## 2020-10-06 ENCOUNTER — HOSPITAL ENCOUNTER (OUTPATIENT)
Dept: PHYSICAL THERAPY | Age: 25
Setting detail: THERAPIES SERIES
Discharge: HOME | End: 2020-10-06
Payer: COMMERCIAL

## 2020-10-06 ENCOUNTER — TELEPHONE (OUTPATIENT)
Dept: ORTHOPEDIC SURGERY | Age: 25
End: 2020-10-06

## 2020-10-06 PROCEDURE — 97112 NEUROMUSCULAR REEDUCATION: CPT

## 2020-10-06 PROCEDURE — 97140 MANUAL THERAPY 1/> REGIONS: CPT

## 2020-10-06 PROCEDURE — 97110 THERAPEUTIC EXERCISES: CPT

## 2020-10-06 NOTE — FLOWSHEET NOTE
[facility], [address]    [place], [place]       Physical Therapy Daily Treatment Note  Date:  10/6/2020    Patient Name:  David Heller    :  1995  MRN: [id number]  Restrictions/Precautions:    Medical/Treatment Diagnosis Information:  · Diagnosis: M76.60 (ICD-10-CM) - Achilles tendon pain  · Treatment Diagnosis: M25.571 Pain in Right Ankle  Insurance/Certification information:  PT Insurance Information: [place]  Physician Information:  Referring Practitioner: Jeremiah Porras MD  Has the plan of care been signed (Y/N):        []  Yes  [x]  No     Date of Patient follow up with Physician: prn with PCP      Is this a Progress Report:     []  Yes  [x]  No        If Yes:  Date Range for reporting period:  Beginning  Ending    Progress report will be due (10 Rx or 30 days whichever is less):        Recertification will be due (POC Duration  / 90 days whichever is less):          Visit # Insurance Allowable Auth Required   8  10/6 60 []  Yes [x]  No        Functional Scale: LEFS: 47.5% deficit   Date assessed: 20       Latex Allergy:  [x]NO      []YES  Preferred Language for Healthcare:   [x]English       []other:      Pain level:  1-2/10 10/6    SUBJECTIVE:  10/6 Patient states that he had a bad weekend. He states that he was in the hospital because his arm swelled up and got red. He states that his achilles is much better.      OBJECTIVE:   ROM   PROM AROM Comments    Left Right Left Right    Dorsiflexion   1 pre-tx, 3 post-tx Mild pain   Plantarflexion   62    Inversion   38    Eversion   12                      Strength  / Left Right Comments   Dorsiflexion 5 5    Plantarflexion 5 4+    Inversion 5 5    Eversion 5 4+        Reflexes/Sensation:    [x]Dermatomes/Myotomes intact    [x]Reflexes equal and normal bilaterally   []Other:    Joint mobility:    [x]Normal    []Hypo   []Hyper    Palpation: TTP along R achilles interventions     MFR posterior R calf and achilles tendon, FLH, post tib, plantar fascia 15 min Start  9/8       Therapeutic Exercise and NMR EXR  [x] (35204) Provided verbal/tactile cueing for activities related to strengthening, flexibility, endurance, ROM for improvements in LE, proximal hip, and core control with self care, mobility, lifting, ambulation. [x] (89048) Provided verbal/tactile cueing for activities related to improving balance, coordination, kinesthetic sense, posture, motor skill, proprioception  to assist with LE, proximal hip, and core control in self care, mobility, lifting, ambulation and eccentric single leg control. NMR and Therapeutic Activities:    [x] (07357 or 40006) Provided verbal/tactile cueing for activities related to improving balance, coordination, kinesthetic sense, posture, motor skill, proprioception and motor activation to allow for proper function of core, proximal hip and LE with self care and ADLs  [] (66245) Gait Re-education- Provided training and instruction to the patient for proper LE, core and proximal hip recruitment and positioning and eccentric body weight control with ambulation re-education including up and down stairs     Home Exercise Program:    [x] (70518) Reviewed/Progressed HEP activities related to strengthening, flexibility, endurance, ROM of core, proximal hip and LE for functional self-care, mobility, lifting and ambulation/stair navigation   [] (59600)Reviewed/Progressed HEP activities related to improving balance, coordination, kinesthetic sense, posture, motor skill, proprioception of core, proximal hip and LE for self care, mobility, lifting, and ambulation/stair navigation     Access Code: [email or web address]   URL: [email or web address]   Date: 09/08/2020   Prepared by: Steph Dalal   Manual Treatments:  PROM / STM / Oscillations-Mobs:  G-I, II, III, IV (PA's, Inf., Post.)  [x] (56075) Provided manual therapy to mobilize LE, proximal hip and/or LS spine soft tissue/joints for the purpose of modulating pain, promoting relaxation,  increasing ROM, reducing/eliminating soft tissue swelling/inflammation/restriction, improving soft tissue extensibility and allowing for proper ROM for normal function with self care, mobility, lifting and ambulation. Modalities:  Cont US 1.4W/cm2, 3MHz at R Achilles 12 min  Charges:  Timed Code Treatment Minutes: 40   Total Treatment Minutes: 40     [] EVAL (LOW) 23601 (typically 20 minutes face-to-face)  [] EVAL (MOD) 36945 (typically 30 minutes face-to-face)  [] EVAL (HIGH) 11474 (typically 45 minutes face-to-face)  [] RE-EVAL     [x] TD(48759) x 1     [] IONTO  [x] NMR (93599) x 1    [] VASO  [x] Manual (82076) x 1     [] Other: US 12 min  9/29  [] TA x      [] Mech Traction (26406)  [] ES(attended) (17082)      [] ES (un) (82286):     GOALS:   Patient stated goal:  Reduce pain; return to walking and running  [] Progressing: [] Met: [] Not Met: [] Adjusted    Therapist goals for Patient:   Short Term Goals: To be achieved in: 2 weeks  1. Independent in HEP and progression per patient tolerance, in order to prevent re-injury. [] Progressing: [] Met: [] Not Met: [] Adjusted   2. Patient will have a decrease in pain to facilitate improvement in movement, function, and ADLs as indicated by Functional Deficits. [] Progressing: [] Met: [] Not Met: [] Adjusted    Long Term Goals: To be achieved in: 4 weeks  1. Disability index score of 20% or less for the LEFS to assist with reaching prior level of function. [] Progressing: [] Met: [] Not Met: [] Adjusted  2. Patient will demonstrate increased AROM to WNL to allow for proper joint functioning as indicated by patients Functional Deficits. [] Progressing: [] Met: [] Not Met: [] Adjusted  3. Patient will demonstrate an increase in Strength to good proximal hip strength and control in LE to allow for proper functional mobility as indicated by patients Functional Deficits. [] Progressing: [] Met: [] Not Met: [] Adjusted  4. Patient will return to Independent functional activities without increased symptoms or restriction. [] Progressing: [] Met: [] Not Met: [] Adjusted  5. Patient will report returning to running for 1 mile or longer with no increase in pain. [] Progressing: [] Met: [] Not Met: [] Adjusted     Overall Progression Towards Functional goals/ Treatment Progress Update:  [] Patient is progressing as expected towards functional goals listed. [] Progression is slowed due to complexities/Impairments listed. [] Progression has been slowed due to co-morbidities. [x] Plan just implemented, too soon to assess goals progression <30days   [] Goals require adjustment due to lack of progress  [] Patient is not progressing as expected and requires additional follow up with physician  [] Other    Prognosis for POC: [x] Good [] Fair  [] Poor      Patient requires continued skilled intervention: [x] Yes  [] No    Treatment/Activity Tolerance:  [x] Patient able to complete treatment  [] Patient limited by fatigue  [] Patient limited by pain     [] Patient limited by other medical complications  [x] Other: 10/6 Pt making progress. Good tolerance to program today. Discussed weaning from boot with patient. Reported feeling better at end of session. Continue to progress as tolerated. Patient Education:  Icing to control pain and swelling. Use of tennis ball for self massage in calf and foot at home. PLAN: See eval  [x] Continue per plan of care [] Alter current plan (see comments above)  [] Plan of care initiated [] Hold pending MD visit [] Discharge      Electronically signed by:  Demi Rosenberg PT, DPT    Note: If patient does not return for scheduled/ recommended follow up visits, this note will serve as a discharge from care along with most recent update on progress.

## 2020-10-07 ENCOUNTER — OFFICE VISIT (OUTPATIENT)
Dept: RHEUMATOLOGY | Age: 25
End: 2020-10-07
Payer: COMMERCIAL

## 2020-10-07 VITALS — HEIGHT: 66 IN | WEIGHT: 156 LBS | TEMPERATURE: 97.8 F | BODY MASS INDEX: 25.07 KG/M2

## 2020-10-07 DIAGNOSIS — M25.50 POLYARTHRALGIA: ICD-10-CM

## 2020-10-07 LAB
HBV SURFACE AB TITR SER: 25.9 MIU/ML
HEPATITIS B CORE IGM ANTIBODY: NORMAL
HEPATITIS B SURFACE ANTIGEN INTERPRETATION: NORMAL
HEPATITIS C ANTIBODY INTERPRETATION: NORMAL

## 2020-10-07 PROCEDURE — 99245 OFF/OP CONSLTJ NEW/EST HI 55: CPT | Performed by: INTERNAL MEDICINE

## 2020-10-07 RX ORDER — PREDNISONE 10 MG/1
TABLET ORAL
Qty: 25 TABLET | Refills: 0 | Status: SHIPPED | OUTPATIENT
Start: 2020-10-07 | End: 2020-12-09

## 2020-10-07 NOTE — PROGRESS NOTES
tells me that he  needs to \"warm up\" for about an hour before he can comfortably move his finger joints, and then gets a lot of pain towards the end of the day after repetitive typing. 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 He loves his job however he is worried that he might not be able to continue working. He did physical therapy that helped with his posture as well as Achilles tendinitis. He is also using a boot. He also started diclofenac which has helped with pain but nothing really helps with the stiffness and weak  in his hands. He was evaluated by Dr. Carol Merino is unrevealing for inflammatory arthritis including RA serologies and inflammatory markers, NSAID and physical therapy is not providing relief to his expectations, therefore he is referred here for further evaluation. Despite of pain in different areas, his predominant symptoms remain across his MCPs PIPs and wrist and weak finger  with unusually long morning stiffness. He went to emergency room on 10/4/ 2020 because of the painful swelling in his right upper extremity-mostly around the biceps area that came rather abruptly -within a day, did not have any fever, injury or any precipitating factor. CT scan showed circumferential swelling of his arm as well as forearm. He was given 1 dose of vancomycin and then evaluated by infectious disease -did not recommend further antibiotics and was discharged home will follow up with primary care physician. His right arm still looks bigger than the left arm however is not painful or has any overlying skin discoloration. He tells me that his hands turn cold, dusky blue and sometimes red but not painful, no distal ulcers. H/o intermittent low back pain- non inflammatory.    Pertinent ROS: Denies weight loss, objective fever, skin rashes or skin thickening, photosensitivity, triphasic Raynauds, focal alopecia, recurrent ocular congestion, dry eyes or mouth, or mucosal ulcers, tinnitus or recent hearing loss. Denies chest pain, palpitations, cough, pleurisy, dysphagia, or features of inflammatory bowel diseases. No h/o blood clots or bleeding disorders. No renal or genitourinary problems. No focal weakness or persistent paresthesia. All other ROS are negative. Past Medical History:   Diagnosis Date    ADHD     Anxiety     Depression      Past Surgical History:   Procedure Laterality Date    SHOULDER SURGERY Right 12/2014     Social History     Socioeconomic History    Marital status: Single     Spouse name: Not on file    Number of children: Not on file    Years of education: Not on file    Highest education level: Not on file   Occupational History    Occupation: [employer]    Social Needs    Financial resource strain: Not hard at all   [employer] insecurity     Worry: Never true     Inability: Never true   [employer] needs     Medical: No     Non-medical: No   Tobacco Use    Smoking status: Never Smoker    Smokeless tobacco: Never Used   Substance and Sexual Activity    Alcohol use:  Yes     Alcohol/week: 4.0 standard drinks     Types: 4 Cans of beer per week     Comment: Social    Drug use: Not on file    Sexual activity: Not on file   Lifestyle    Physical activity     Days per week: Not on file     Minutes per session: Not on file    Stress: Not on file   Relationships    Social connections     Talks on phone: Not on file     Gets together: Not on file     Attends Confucianist service: Not on file     Active member of club or organization: Not on file     Attends meetings of clubs or organizations: Not on file     Relationship status: Not on file    Intimate partner violence     Fear of current or ex partner: Not on file     Emotionally abused: Not on file     Physically abused: Not on file     Forced sexual activity: Not on file   Other Topics Concern    Not on file Social History Narrative    Not on file       No family history of autoimmune diseases    Current Outpatient Medications   Medication Sig Dispense Refill    predniSONE (DELTASONE) 10 MG tablet 2 pills po q AM for 7 days. 1pill po Q AM for next 7 days. 1/2 pill po Q AM for next 7 days and stop. 25 tablet 0    cyclobenzaprine (FLEXERIL) 5 MG tablet TAKE 1 TABLET BY MOUTH THREE TIMES DAILY AS NEEDED FOR MUSCLE SPASMS 90 tablet 1    diclofenac sodium (VOLTAREN) 1 % GEL Apply 2 g topically 4 times daily 1 Tube 5    diclofenac (VOLTAREN) 75 MG EC tablet Take 1 tablet by mouth 2 times daily 60 tablet 3    Lisdexamfetamine Dimesylate (VYVANSE) 60 MG CAPS Take 1 capsule by mouth daily . No current facility-administered medications for this visit. No Known Allergies    PHYSICAL EXAM:    Vitals:    Temp 97.8 °F (36.6 °C) (Skin)   Ht 5' 6\" (1.676 m)   Wt 156 lb (70.8 kg)   BMI 25.18 kg/m²   General appearance/ Psychiatric: well nourished, and well groomed, normal judgement, alert, appears stated age and cooperative. MKS:   Subjective stiffness across his MCPs and PIPs right hand worse than the left hand, without any focal tenderness. Full fist bilaterally but complains of weak . Rest of the finger joints are nontender, no swelling or synovitis. Wrists normal exam but has subjective stiffness dorsally bilaterally, right worse than left. No obvious swelling or synovitis noted. Elbows are nontender, no swelling or synovitis, full range of motion. Similarly shoulders have full range of motion. His right arm visibly looks bigger than the left arm however no induration, tenderness, overlying skin changes. Range of motion in his shoulders and elbows does not cause any discomfort in his forearm muscles. Hips, knees, ankle and feet joints-normal exam.  Spine-normal exam.  Normal gait and muscle strength in upper and lower extremities.   Skin: Dusky bluish discoloration noted in his finger joints as well as toes, fingers and toes feel cold. Normal finger pulps without any scars or ulcers. No skin thickening, dilated nailfold capillaries or sclerodactyly. No rashes, no induration or skin thickening or nodules. No evidence ischemia or deformities noted in digits or nails. HEENT: Normal lids, lacrimal glands and pupils. No oral or nasal ulcers. Salivary glands reveal no evidence of abnormality. External inspection of the ears and nose within normal limits. Neck: No masses or asymmetry. No thyroid enlargement. Chest: Normal effort, clear to auscultation. Heart:  Normal s1/s2, no leg edema. Neurologic: normal deep tendon reflexes. No foot or wrist drop. DATA:   Lab Results   Component Value Date    WBC 9.4 10/05/2020    HGB 14.0 10/05/2020    HCT 40.0 10/05/2020    MCV 86.7 10/05/2020     10/05/2020         Chemistry        Component Value Date/Time     10/05/2020 0545    K 4.0 10/05/2020 0545     10/05/2020 0545    CO2 27 10/05/2020 0545    BUN 16 10/05/2020 0545    CREATININE 1.02 10/05/2020 0545        Component Value Date/Time    CALCIUM 9.2 10/05/2020 0545    ALKPHOS 77 08/28/2019 1127    AST 22 08/28/2019 1127    ALT 29 08/28/2019 1127    BILITOT 0.3 08/28/2019 1127          No results found for: [facility]  Lab Results   Component Value Date    SEDRATE 2 10/04/2020     No results found for: CRP  Lab Results   Component Value Date    SEDRATE 2 10/04/2020     No results found for: CKTOTAL  Lab Results   Component Value Date    TSH 1.94 08/28/2019        A/P- See above.

## 2020-10-08 LAB — ANTI-NUCLEAR ANTIBODY (ANA): NEGATIVE

## 2020-10-09 ENCOUNTER — HOSPITAL ENCOUNTER (OUTPATIENT)
Dept: PHYSICAL THERAPY | Age: 25
Setting detail: THERAPIES SERIES
Discharge: HOME | End: 2020-10-09
Payer: COMMERCIAL

## 2020-10-09 PROCEDURE — 97140 MANUAL THERAPY 1/> REGIONS: CPT

## 2020-10-09 PROCEDURE — 97035 APP MDLTY 1+ULTRASOUND EA 15: CPT

## 2020-10-09 PROCEDURE — 97110 THERAPEUTIC EXERCISES: CPT

## 2020-10-09 PROCEDURE — 97112 NEUROMUSCULAR REEDUCATION: CPT

## 2020-10-09 NOTE — FLOWSHEET NOTE
[facility] [address]    [facility]       Physical Therapy Daily Treatment Note  Date:  10/9/2020    Patient Name:  Feli Dutton    :  1995  MRN: [id number]  Restrictions/Precautions:    Medical/Treatment Diagnosis Information:  · Diagnosis: M76.60 (ICD-10-CM) - Achilles tendon pain  · Treatment Diagnosis: M25.571 Pain in Right Ankle  Insurance/Certification information:  PT Insurance Information: [place]  Physician Information:  Referring Practitioner: Lindsay Kee MD  Has the plan of care been signed (Y/N):        []  Yes  [x]  No     Date of Patient follow up with Physician: prn with PCP      Is this a Progress Report:     []  Yes  [x]  No        If Yes:  Date Range for reporting period:  Beginning  Ending    Progress report will be due (10 Rx or 30 days whichever is less):        Recertification will be due (POC Duration  / 90 days whichever is less):          Visit # Insurance Allowable Auth Required   9  10/9 60 []  Yes [x]  No        Functional Scale: LEFS: 47.5% deficit   Date assessed: 20       Latex Allergy:  [x]NO      []YES  Preferred Language for Healthcare:   [x]English       []other:      Pain level:  0/10 10/9    SUBJECTIVE:  10/9 Patient states that his achilles is sore today. He states that it has been sore since Monday.        OBJECTIVE:   ROM   PROM AROM Comments    Left Right Left Right    Dorsiflexion   1 pre-tx, 3 post-tx Mild pain   Plantarflexion   62    Inversion   38    Eversion   12                      Strength   Left Right Comments   Dorsiflexion 5 5    Plantarflexion 5 4+    Inversion 5 5    Eversion 5 4+        Reflexes/Sensation:    [x]Dermatomes/Myotomes intact    [x]Reflexes equal and normal bilaterally   []Other:    Joint mobility:    [x]Normal    []Hypo   []Hyper    Palpation: TTP along R achilles tendon, FHL amd post tib in talar tunnel and into plantar fascia; hip and/or LS spine soft tissue/joints for the purpose of modulating pain, promoting relaxation,  increasing ROM, reducing/eliminating soft tissue swelling/inflammation/restriction, improving soft tissue extensibility and allowing for proper ROM for normal function with self care, mobility, lifting and ambulation. Modalities:  Cont US 1.4W/cm2, 3MHz at R Achilles 12 min  Charges:  Timed Code Treatment Minutes: 45   Total Treatment Minutes: 68     [] EVAL (LOW) 78586 (typically 20 minutes face-to-face)  [] EVAL (MOD) 73464 (typically 30 minutes face-to-face)  [] EVAL (HIGH) 19182 (typically 45 minutes face-to-face)  [] RE-EVAL     [x] SI(66186) x 1     [] IONTO  [x] NMR (51681) x 1    [] VASO  [x] Manual (04804) x 1     [x] Other: US 12 min  10/9  [] TA x      [] Mech Traction (35484)  [] ES(attended) (78878)      [] ES (un) (92877):     GOALS:   Patient stated goal:  Reduce pain; return to walking and running  [] Progressing: [] Met: [] Not Met: [] Adjusted    Therapist goals for Patient:   Short Term Goals: To be achieved in: 2 weeks  1. Independent in HEP and progression per patient tolerance, in order to prevent re-injury. [] Progressing: [] Met: [] Not Met: [] Adjusted   2. Patient will have a decrease in pain to facilitate improvement in movement, function, and ADLs as indicated by Functional Deficits. [] Progressing: [] Met: [] Not Met: [] Adjusted    Long Term Goals: To be achieved in: 4 weeks  1. Disability index score of 20% or less for the LEFS to assist with reaching prior level of function. [] Progressing: [] Met: [] Not Met: [] Adjusted  2. Patient will demonstrate increased AROM to WNL to allow for proper joint functioning as indicated by patients Functional Deficits. [] Progressing: [] Met: [] Not Met: [] Adjusted  3.  Patient will demonstrate an increase in Strength to good proximal hip strength and control in LE to allow for proper functional mobility as indicated by patients Functional Deficits. [] Progressing: [] Met: [] Not Met: [] Adjusted  4. Patient will return to Independent functional activities without increased symptoms or restriction. [] Progressing: [] Met: [] Not Met: [] Adjusted  5. Patient will report returning to running for 1 mile or longer with no increase in pain. [] Progressing: [] Met: [] Not Met: [] Adjusted     Overall Progression Towards Functional goals/ Treatment Progress Update:  [] Patient is progressing as expected towards functional goals listed. [] Progression is slowed due to complexities/Impairments listed. [] Progression has been slowed due to co-morbidities. [x] Plan just implemented, too soon to assess goals progression <30days   [] Goals require adjustment due to lack of progress  [] Patient is not progressing as expected and requires additional follow up with physician  [] Other    Prognosis for POC: [x] Good [] Fair  [] Poor      Patient requires continued skilled intervention: [x] Yes  [] No    Treatment/Activity Tolerance:  [x] Patient able to complete treatment  [] Patient limited by fatigue  [] Patient limited by pain     [] Patient limited by other medical complications  [x] Other: 10/9 Pt making progress. Good tolerance to program today. Discussed weaning from boot with patient. Reported feeling better at end of session. Continue to progress as tolerated. Patient Education:  Icing to control pain and swelling. Use of tennis ball for self massage in calf and foot at home. PLAN: See eval  [x] Continue per plan of care [] Alter current plan (see comments above)  [] Plan of care initiated [] Hold pending MD visit [] Discharge      Electronically signed by:  Edith Cam, PT, DPT    Note: If patient does not return for scheduled/ recommended follow up visits, this note will serve as a discharge from care along with most recent update on progress.

## 2020-10-10 LAB — HLA B27: NEGATIVE

## 2020-10-13 ENCOUNTER — HOSPITAL ENCOUNTER (OUTPATIENT)
Dept: PHYSICAL THERAPY | Age: 25
Setting detail: THERAPIES SERIES
Discharge: HOME | End: 2020-10-13
Payer: COMMERCIAL

## 2020-10-13 PROCEDURE — 97140 MANUAL THERAPY 1/> REGIONS: CPT

## 2020-10-13 PROCEDURE — 97035 APP MDLTY 1+ULTRASOUND EA 15: CPT

## 2020-10-13 PROCEDURE — 97112 NEUROMUSCULAR REEDUCATION: CPT

## 2020-10-13 PROCEDURE — 97110 THERAPEUTIC EXERCISES: CPT

## 2020-10-13 NOTE — FLOWSHEET NOTE
The [address], [address]    [place], [place]       Physical Therapy Daily Treatment Note  Date:  10/13/2020    Patient Name:  Marlee Templeton    :  1995  MRN: [id number]  Restrictions/Precautions:    Medical/Treatment Diagnosis Information:  · Diagnosis: M76.60 (ICD-10-CM) - Achilles tendon pain  · Treatment Diagnosis: M25.571 Pain in Right Ankle  Insurance/Certification information:  PT Insurance Information: [place]  Physician Information:  Referring Practitioner: Tata Rudolph MD  Has the plan of care been signed (Y/N):        []  Yes  [x]  No     Date of Patient follow up with Physician: prn with PCP      Is this a Progress Report:     []  Yes  [x]  No        If Yes:  Date Range for reporting period:  Beginning  Ending    Progress report will be due (10 Rx or 30 days whichever is less):        Recertification will be due (POC Duration  / 90 days whichever is less):          Visit # Insurance Allowable Auth Required   10  10/13 60 []  Yes [x]  No        Functional Scale: LEFS: 47.5% deficit   Date assessed: 20       Latex Allergy:  [x]NO      []YES  Preferred Language for Healthcare:   [x]English       []other:      Pain level:  2/10 10/13    SUBJECTIVE:  10/13 Patient states that he is getting frustrated with his ankle. He states that going up stairs still bothers him.         OBJECTIVE:   ROM   PROM AROM Comments    Left Right Left Right    Dorsiflexion   1 pre-tx, 3 post-tx Mild pain   Plantarflexion   62    Inversion   38    Eversion   12                      Strength  / Left Right Comments   Dorsiflexion 5 5    Plantarflexion 5 4+    Inversion 5 5    Eversion 5 4+        Reflexes/Sensation:    [x]Dermatomes/Myotomes intact    [x]Reflexes equal and normal bilaterally   []Other:    Joint mobility:    [x]Normal    []Hypo   []Hyper    Palpation: TTP along R achilles tendon, FHL amd post tib in talar tunnel and into plantar fascia; mild edema present  9/25    Functional Mobility/Transfers: Independent with ADL's and self care; unable to walk and run; unable to work out; pain and difficulty with stairs and squatting   9/8    Posture: WNL  9/8    Gait: (include devices/WB status) very mild antalgic gait present with walking boot w dec WB time on R  9/25                       [x] Patient history, allergies, meds reviewed. Medical chart reviewed. See intake form. 9/8    Review Of Systems (ROS):   [x]Performed Review of systems (Integumentary, CardioPulmonary, Neurological) by intake and observation. Intake form has been scanned into medical record. Patient has been instructed to contact their primary care physician regarding ROS issues if not already being addressed at this time.   9/8      RESTRICTIONS/PRECAUTIONS: None    Exercises/Interventions:     Exercise/Equipment Resistance/Repetitions Other comments   ROM     ABC'S     BAPS     Bottle Roll     Inversion/Eversion     Ankle Pumps 30x    Toe Curls     Rocks     Towels     Self TC joint mobilization with stool 30x Start 10/13   Stretching     Circles 30x CW and CCW    Toe Extension      Towel Pull 1 30 sec x 5 Gastroc    Towel Pull 2 30 sec x 5 Soleus    ERMI     Incline Stretch     Pro-Stretch     Hamstring 30 sec x 5 Start 9/11   Stair Stretch     Calf 1 (standing gastroc)    Calf 2 (standing soleus)         Isometrics     Dorsiflexion Start 9/11   Plantarflexion Start 9/11   Inversion Start 9/11   Eversion Start 9/11        PRE's     Heel walk     Toe walk     SLR     Calf Raises     Step Up     Knee Extension     Hamstring Curls     Leg Pres     Seated SL heel raises 3 x 10 ^10/13        Balance:     SLB 2x30\" R    Rocker Board 30 sec x 3 Start 9/29   BOSU     SLS     Aeromat     Foam Roll     Plyoback     Tandem Stance     Biodex     Cone walking 5 min Start 9/29   Bike     Treadmill          Manual interventions     MFR posterior R calf and achilles tendon, FLH, promoting relaxation,  increasing ROM, reducing/eliminating soft tissue swelling/inflammation/restriction, improving soft tissue extensibility and allowing for proper ROM for normal function with self care, mobility, lifting and ambulation. Modalities:  Cont US 1.4W/cm2, 3MHz at R Achilles 12 min  Charges:  Timed Code Treatment Minutes: 45   Total Treatment Minutes: 70     [] EVAL (LOW) 14971 (typically 20 minutes face-to-face)  [] EVAL (MOD) 04664 (typically 30 minutes face-to-face)  [] EVAL (HIGH) 76521 (typically 45 minutes face-to-face)  [] RE-EVAL     [x] DT(02370) x 1     [] IONTO  [x] NMR (48342) x 1    [] VASO  [x] Manual (49998) x 1     [x] Other: US 12 min  10/13  [] TA x      [] Mech Traction (09169)  [] ES(attended) (20731)      [] ES (un) (75269):     GOALS:   Patient stated goal:  Reduce pain; return to walking and running  [] Progressing: [] Met: [] Not Met: [] Adjusted    Therapist goals for Patient:   Short Term Goals: To be achieved in: 2 weeks  1. Independent in HEP and progression per patient tolerance, in order to prevent re-injury. [] Progressing: [] Met: [] Not Met: [] Adjusted   2. Patient will have a decrease in pain to facilitate improvement in movement, function, and ADLs as indicated by Functional Deficits. [] Progressing: [] Met: [] Not Met: [] Adjusted    Long Term Goals: To be achieved in: 4 weeks  1. Disability index score of 20% or less for the LEFS to assist with reaching prior level of function. [] Progressing: [] Met: [] Not Met: [] Adjusted  2. Patient will demonstrate increased AROM to WNL to allow for proper joint functioning as indicated by patients Functional Deficits. [] Progressing: [] Met: [] Not Met: [] Adjusted  3. Patient will demonstrate an increase in Strength to good proximal hip strength and control in LE to allow for proper functional mobility as indicated by patients Functional Deficits. [] Progressing: [] Met: [] Not Met: [] Adjusted  4.  Patient will return to Independent functional activities without increased symptoms or restriction. [] Progressing: [] Met: [] Not Met: [] Adjusted  5. Patient will report returning to running for 1 mile or longer with no increase in pain. [] Progressing: [] Met: [] Not Met: [] Adjusted     Overall Progression Towards Functional goals/ Treatment Progress Update:  [] Patient is progressing as expected towards functional goals listed. [] Progression is slowed due to complexities/Impairments listed. [] Progression has been slowed due to co-morbidities. [x] Plan just implemented, too soon to assess goals progression <30days   [] Goals require adjustment due to lack of progress  [] Patient is not progressing as expected and requires additional follow up with physician  [] Other    Prognosis for POC: [x] Good [] Fair  [] Poor      Patient requires continued skilled intervention: [x] Yes  [] No    Treatment/Activity Tolerance:  [x] Patient able to complete treatment  [] Patient limited by fatigue  [] Patient limited by pain     [] Patient limited by other medical complications  [x] Other: 10/13 Pt making progress. Good tolerance to program today. Discussed weaning from boot with patient. Reported feeling better at end of session. Continue to progress as tolerated. Patient Education:  Icing to control pain and swelling. Use of tennis ball for self massage in calf and foot at home. PLAN: See eval  [x] Continue per plan of care [] Alter current plan (see comments above)  [] Plan of care initiated [] Hold pending MD visit [] Discharge      Electronically signed by:  Gamaliel Perez, PT, DPT    Note: If patient does not return for scheduled/ recommended follow up visits, this note will serve as a discharge from care along with most recent update on progress.

## 2020-10-15 ENCOUNTER — OFFICE VISIT (OUTPATIENT)
Dept: RHEUMATOLOGY | Age: 25
End: 2020-10-15
Payer: COMMERCIAL

## 2020-10-15 VITALS — WEIGHT: 156 LBS | HEIGHT: 66 IN | TEMPERATURE: 97.9 F | BODY MASS INDEX: 25.07 KG/M2

## 2020-10-15 PROCEDURE — 99214 OFFICE O/P EST MOD 30 MIN: CPT | Performed by: INTERNAL MEDICINE

## 2020-10-15 RX ORDER — SULFASALAZINE 500 MG/1
TABLET ORAL
Qty: 120 TABLET | Refills: 1 | Status: SHIPPED | OUTPATIENT
Start: 2020-10-15 | End: 2021-09-08 | Stop reason: SDUPTHER

## 2020-10-15 NOTE — PROGRESS NOTES
[facility] [place], [place]                                                           [address] [place] [address], Sindy 1019 (z) [phone] (F)      Dear Dr. Nuvia Kothari MD:  Please find Rheumatology assessment. Thank you for giving me the opportunity to be involved in [address] [place] [address] care and I look forward following Laura Yates along with you. If you have any questions or concerns please feel free to reach me. Note is transcribed using voice recognition software. Inadvertent computerized transcription errors may be present. Patient identification: Abi Settle: 1995,25 y.o. Sex: male     A/P  Laura Yates was seen today for follow-up. Diagnoses and all orders for this visit:    Polyarthralgia    High risk medication use  -     Comprehensive Metabolic Panel; Future  -     Sedimentation Rate; Future  -     C-Reactive Protein; Future  -     CBC Auto Differential; Future    Other orders  -     sulfaSALAzine (AZULFIDINE) 500 MG tablet; Take 1 tab twice a day for week 1. Increase 2 tab twice a day thereafter as tolerated. A lot of subjective findings of joint stiffness, discomfort mainly across PIPs and MCPs, shoulders-70% improvement in his symptoms mainly stiffness and swelling (subjective) on prednisone. Pain scores are about the same per patient. The biggest challenge I have in this gentleman is there is no objective findings  including joint examination, laboratory markers and imaging studies but history is concerning for inflammatory arthritis. Given that he responded very well to prednisone, we talked about trying sulfasalazine to see if that helps with his joint symptoms.   If we do not see a desired response with sulfasalazine, I would definitely getting off all medication and possibly get MRI of the hand to evaluate the etiology of hand pain. RTC 2 months. Patient indicates understanding and agrees with the management plan. I reviewed patient's history, referral documents and electronic medical records. Copy of consult note is being routed electronically/faxed to referring physician. #######################################################################    Follow-up for polyarthralgias. We met last week because of joint pain, stiffness, fullness exclusively in his finger joints MCPs, PIPs, wrist and some discomfort in his shoulders-repetitive use makes symptoms much worse, morning stiffness lasts about 1 hour, sometimes notices swelling across his PIPs and MCPs. He started with 20 mg of prednisone, tells me that he has stiffness soreness especially in the morning has gotten much better however still fingers are very achy, sore towards the end of the day after repetitive use. He does notice improvement on prednisone and desires to continue it. All his work-up has been negative including serologies RF, CCP, PHAM, HLA-B27 serologies, hepatitis antibodies. No psoriasis or inflammatory bowel diseases. All other review of systems are negative.         Past Medical History:   Diagnosis Date    ADHD     Anxiety     Depression      Past Surgical History:   Procedure Laterality Date    SHOULDER SURGERY Right 12/2014     Social History     Socioeconomic History    Marital status: Single     Spouse name: Not on file    Number of children: Not on file    Years of education: Not on file    Highest education level: Not on file   Occupational History    Occupation: [employer]    Social Needs    Financial resource strain: Not hard at all   [employer] insecurity     Worry: Never true     Inability: Never true   [employer] needs     Medical: No     Non-medical: No   Tobacco Use    Smoking status: Never Smoker    Smokeless tobacco: Never Used   Substance and Sexual Activity    Alcohol use: Yes     Alcohol/week: 4.0 standard drinks     Types: 4 Cans of beer per week     Comment: Social    Drug use: Not on file    Sexual activity: Not on file   Lifestyle    Physical activity     Days per week: Not on file     Minutes per session: Not on file    Stress: Not on file   Relationships    Social connections     Talks on phone: Not on file     Gets together: Not on file     Attends Rastafarian service: Not on file     Active member of club or organization: Not on file     Attends meetings of clubs or organizations: Not on file     Relationship status: Not on file    Intimate partner violence     Fear of current or ex partner: Not on file     Emotionally abused: Not on file     Physically abused: Not on file     Forced sexual activity: Not on file   Other Topics Concern    Not on file   Social History Narrative    Not on file       No family history of autoimmune diseases    Current Outpatient Medications   Medication Sig Dispense Refill    sulfaSALAzine (AZULFIDINE) 500 MG tablet Take 1 tab twice a day for week 1. Increase 2 tab twice a day thereafter as tolerated. 120 tablet 1    predniSONE (DELTASONE) 10 MG tablet 2 pills po q AM for 7 days. 1pill po Q AM for next 7 days. 1/2 pill po Q AM for next 7 days and stop. 25 tablet 0    cyclobenzaprine (FLEXERIL) 5 MG tablet TAKE 1 TABLET BY MOUTH THREE TIMES DAILY AS NEEDED FOR MUSCLE SPASMS 90 tablet 1    diclofenac sodium (VOLTAREN) 1 % GEL Apply 2 g topically 4 times daily 1 Tube 5    diclofenac (VOLTAREN) 75 MG EC tablet Take 1 tablet by mouth 2 times daily 60 tablet 3    Lisdexamfetamine Dimesylate (VYVANSE) 60 MG CAPS Take 1 capsule by mouth daily . No current facility-administered medications for this visit.       No Known Allergies    PHYSICAL EXAM:    Vitals:    Temp 97.9 °F (36.6 °C) (Skin)   Ht 5' 6\" (1.676 m)   Wt 156 lb (70.8 kg)   BMI 25.18 1.94 08/28/2019        A/P- See above.

## 2020-10-16 ENCOUNTER — HOSPITAL ENCOUNTER (OUTPATIENT)
Dept: PHYSICAL THERAPY | Age: 25
Setting detail: THERAPIES SERIES
Discharge: HOME | End: 2020-10-16
Payer: COMMERCIAL

## 2020-10-16 PROCEDURE — 97035 APP MDLTY 1+ULTRASOUND EA 15: CPT

## 2020-10-16 PROCEDURE — 97140 MANUAL THERAPY 1/> REGIONS: CPT

## 2020-10-16 PROCEDURE — 97110 THERAPEUTIC EXERCISES: CPT

## 2020-10-16 PROCEDURE — 97112 NEUROMUSCULAR REEDUCATION: CPT

## 2020-10-16 NOTE — FLOWSHEET NOTE
The [address] [facility],[address], [address] [facility] [address]    [place], [place]       Physical Therapy Daily Treatment Note  Date:  10/16/2020    Patient Name:  Marino Cornell    :  1995  MRN: [id number]  Restrictions/Precautions:    Medical/Treatment Diagnosis Information:  · Diagnosis: M76.60 (ICD-10-CM) - Achilles tendon pain  · Treatment Diagnosis: M25.571 Pain in Right Ankle  Insurance/Certification information:  PT Insurance Information: [place]  Physician Information:  Referring Practitioner: Ricardo Contreras MD  Has the plan of care been signed (Y/N):        []  Yes  [x]  No     Date of Patient follow up with Physician: prn with PCP      Is this a Progress Report:     []  Yes  [x]  No        If Yes:  Date Range for reporting period:  Beginning  Ending    Progress report will be due (10 Rx or 30 days whichever is less):        Recertification will be due (POC Duration  / 90 days whichever is less):          Visit # Insurance Allowable Auth Required   11  10/16 60 []  Yes [x]  No        Functional Scale: LEFS: 47.5% deficit   Date assessed: 20       Latex Allergy:  [x]NO      []YES  Preferred Language for Healthcare:   [x]English       []other:      Pain level:  0/10  Soreness present  10/16    SUBJECTIVE:  10/16 Patient states that he is doing okay. He states that he tried to walk around on it yesterday and was a little sore but better.         OBJECTIVE:   ROM   PROM AROM Comments    Left Right Left Right    Dorsiflexion   1 pre-tx, 3 post-tx Mild pain   Plantarflexion   62    Inversion   38    Eversion   12                      Strength  9/8 Left Right Comments   Dorsiflexion 5 5    Plantarflexion 5 4+    Inversion 5 5    Eversion 5 4+        Reflexes/Sensation:    [x]Dermatomes/Myotomes intact    [x]Reflexes equal and normal bilaterally   []Other:    Joint mobility:    [x]Normal    []Hypo   []Hyper    Palpation: TTP along R achilles tendon, FHL amd post tib in talar tunnel and into plantar fascia; mild edema present  9/25    Functional Mobility/Transfers: Independent with ADL's and self care; unable to walk and run; unable to work out; pain and difficulty with stairs and squatting   9/8    Posture: WNL  9/8    Gait: (include devices/WB status) very mild antalgic gait present with walking boot w dec WB time on R  9/25                       [x] Patient history, allergies, meds reviewed. Medical chart reviewed. See intake form. 9/8    Review Of Systems (ROS):   [x]Performed Review of systems (Integumentary, CardioPulmonary, Neurological) by intake and observation. Intake form has been scanned into medical record. Patient has been instructed to contact their primary care physician regarding ROS issues if not already being addressed at this time.   9/8      RESTRICTIONS/PRECAUTIONS: None    Exercises/Interventions:     Exercise/Equipment Resistance/Repetitions Other comments   ROM     ABC'S     BAPS     Bottle Roll     Inversion/Eversion     Ankle Pumps 30x    Toe Curls     Rocks     Towels     Self TC joint mobilization with stool 30x Start 10/13   Stretching     Circles 30x CW and CCW    Toe Extension      Towel Pull 1 30 sec x 5 Gastroc    Towel Pull 2 30 sec x 5 Soleus    ERMI     Incline Stretch     Pro-Stretch     Hamstring 30 sec x 5 Start 9/11   Stair Stretch     Calf 1 (standing gastroc)    Calf 2 (standing soleus)         Isometrics     Dorsiflexion Start 9/11   Plantarflexion Start 9/11   Inversion Start 9/11   Eversion Start 9/11        PRE's     Heel walk     Toe walk     SLR     Calf Raises     Step Up     Knee Extension     Hamstring Curls     Leg Pres     Seated SL heel raises 3 x 10 ^10/13   Bike 5 min Start 10/16   Balance:     SLB 2x30\" R    Rocker Board 30 sec x 3 Start 9/29   BOSU     SLS     Aeromat     Foam Roll     Plyoback     Tandem Stance     Biodex     Cone walking 5 min Start 9/29   Bike     Treadmill          Manual interventions tissue/joints for the purpose of modulating pain, promoting relaxation,  increasing ROM, reducing/eliminating soft tissue swelling/inflammation/restriction, improving soft tissue extensibility and allowing for proper ROM for normal function with self care, mobility, lifting and ambulation. Modalities:  Cont US 1.4W/cm2, 3MHz at R Achilles 12 min  Charges:  Timed Code Treatment Minutes: 45   Total Treatment Minutes: 65     [] EVAL (LOW) 62376 (typically 20 minutes face-to-face)  [] EVAL (MOD) 46872 (typically 30 minutes face-to-face)  [] EVAL (HIGH) 30652 (typically 45 minutes face-to-face)  [] RE-EVAL     [x] JY(92736) x 1     [] IONTO  [x] NMR (96319) x 1    [] VASO  [x] Manual (32969) x 1     [x] Other: US 12 min  10/16  [] TA x      [] Mech Traction (96680)  [] ES(attended) (56386)      [] ES (un) (79833):     GOALS:   Patient stated goal:  Reduce pain; return to walking and running  [] Progressing: [] Met: [] Not Met: [] Adjusted    Therapist goals for Patient:   Short Term Goals: To be achieved in: 2 weeks  1. Independent in HEP and progression per patient tolerance, in order to prevent re-injury. [] Progressing: [] Met: [] Not Met: [] Adjusted   2. Patient will have a decrease in pain to facilitate improvement in movement, function, and ADLs as indicated by Functional Deficits. [] Progressing: [] Met: [] Not Met: [] Adjusted    Long Term Goals: To be achieved in: 4 weeks  1. Disability index score of 20% or less for the LEFS to assist with reaching prior level of function. [] Progressing: [] Met: [] Not Met: [] Adjusted  2. Patient will demonstrate increased AROM to WNL to allow for proper joint functioning as indicated by patients Functional Deficits. [] Progressing: [] Met: [] Not Met: [] Adjusted  3. Patient will demonstrate an increase in Strength to good proximal hip strength and control in LE to allow for proper functional mobility as indicated by patients Functional Deficits.    [] Progressing: [] Met: [] Not Met: [] Adjusted  4. Patient will return to Independent functional activities without increased symptoms or restriction. [] Progressing: [] Met: [] Not Met: [] Adjusted  5. Patient will report returning to running for 1 mile or longer with no increase in pain. [] Progressing: [] Met: [] Not Met: [] Adjusted     Overall Progression Towards Functional goals/ Treatment Progress Update:  [] Patient is progressing as expected towards functional goals listed. [] Progression is slowed due to complexities/Impairments listed. [] Progression has been slowed due to co-morbidities. [x] Plan just implemented, too soon to assess goals progression <30days   [] Goals require adjustment due to lack of progress  [] Patient is not progressing as expected and requires additional follow up with physician  [] Other    Prognosis for POC: [x] Good [] Fair  [] Poor      Patient requires continued skilled intervention: [x] Yes  [] No    Treatment/Activity Tolerance:  [x] Patient able to complete treatment  [] Patient limited by fatigue  [] Patient limited by pain     [] Patient limited by other medical complications  [x] Other: 10/16 Pt making progress. Good tolerance to program today. Reported feeling better at end of session. Continue to progress as tolerated. Patient Education:  Icing to control pain and swelling. Use of tennis ball for self massage in calf and foot at home. PLAN: See jackie  [x] Continue per plan of care [] Alter current plan (see comments above)  [] Plan of care initiated [] Hold pending MD visit [] Discharge      Electronically signed by:  Mega Robins, PT, DPT    Note: If patient does not return for scheduled/ recommended follow up visits, this note will serve as a discharge from care along with most recent update on progress.

## 2020-10-19 ENCOUNTER — HOSPITAL ENCOUNTER (OUTPATIENT)
Dept: PHYSICAL THERAPY | Age: 25
Setting detail: THERAPIES SERIES
Discharge: HOME | End: 2020-10-19
Payer: COMMERCIAL

## 2020-10-19 PROCEDURE — 97140 MANUAL THERAPY 1/> REGIONS: CPT

## 2020-10-19 PROCEDURE — 97112 NEUROMUSCULAR REEDUCATION: CPT

## 2020-10-19 PROCEDURE — 97110 THERAPEUTIC EXERCISES: CPT

## 2020-10-23 ENCOUNTER — HOSPITAL ENCOUNTER (OUTPATIENT)
Dept: PHYSICAL THERAPY | Age: 25
Setting detail: THERAPIES SERIES
Discharge: HOME | End: 2020-10-23
Payer: COMMERCIAL

## 2020-10-23 PROCEDURE — 97112 NEUROMUSCULAR REEDUCATION: CPT

## 2020-10-23 PROCEDURE — 97110 THERAPEUTIC EXERCISES: CPT

## 2020-10-23 PROCEDURE — 97140 MANUAL THERAPY 1/> REGIONS: CPT

## 2020-10-23 NOTE — FLOWSHEET NOTE
The [facility] [facility] [address]    [place], [place]       Physical Therapy Daily Treatment Note  Date:  10/23/2020    Patient Name:  Viri Huang    :  1995  MRN: [id number]  Restrictions/Precautions:    Medical/Treatment Diagnosis Information:  · Diagnosis: M76.60 (ICD-10-CM) - Achilles tendon pain  · Treatment Diagnosis: M25.571 Pain in Right Ankle  Insurance/Certification information:  PT Insurance Information: [place]  Physician Information:  Referring Practitioner: Chico Beck MD  Has the plan of care been signed (Y/N):        []  Yes  [x]  No     Date of Patient follow up with Physician: prn with PCP      Is this a Progress Report:     []  Yes  [x]  No        If Yes:  Date Range for reporting period:  Beginning  Ending    Progress report will be due (10 Rx or 30 days whichever is less):        Recertification will be due (POC Duration  / 90 days whichever is less):          Visit # Insurance Allowable Auth Required   13  10/23 60 []  Yes [x]  No        Functional Scale: LEFS: 47.5% deficit   Date assessed: 20       Latex Allergy:  [x]NO      []YES  Preferred Language for Healthcare:   [x]English       []other:      Pain level:  0/10  Soreness present  10/23    SUBJECTIVE:  10/23 Patient states that his achilles is feeling better.        OBJECTIVE:   ROM   PROM AROM Comments    Left Right Left Right    Dorsiflexion   1 pre-tx, 3 post-tx Mild pain   Plantarflexion   62    Inversion   38    Eversion   12                      Strength  /8 Left Right Comments   Dorsiflexion 5 5    Plantarflexion 5 4+    Inversion 5 5    Eversion 5 4+        Reflexes/Sensation:    [x]Dermatomes/Myotomes intact    [x]Reflexes equal and normal bilaterally   []Other:    Joint mobility:    [x]Normal    []Hypo   []Hyper    Palpation: TTP along R achilles tendon, FHL amd post tib in talar tunnel and into plantar fascia; mild edema present 9/25    Functional Mobility/Transfers: Independent with ADL's and self care; unable to walk and run; unable to work out; pain and difficulty with stairs and squatting   9/8    Posture: WNL  9/8    Gait: (include devices/WB status) very mild antalgic gait present with walking boot w dec WB time on R  9/25                       [x] Patient history, allergies, meds reviewed. Medical chart reviewed. See intake form. 9/8    Review Of Systems (ROS):   [x]Performed Review of systems (Integumentary, CardioPulmonary, Neurological) by intake and observation. Intake form has been scanned into medical record. Patient has been instructed to contact their primary care physician regarding ROS issues if not already being addressed at this time.   9/8      RESTRICTIONS/PRECAUTIONS: None    Exercises/Interventions:     Exercise/Equipment Resistance/Repetitions Other comments   ROM     ABC'S     BAPS     Bottle Roll     Inversion/Eversion     Ankle Pumps 30x    Toe Curls     Rocks     Towels     Self TC joint mobilization with stool 30x Start 10/13   Stretching     Circles 30x CW and CCW    Toe Extension      Towel Pull 1 30 sec x 5 Gastroc    Towel Pull 2 30 sec x 5 Soleus    ERMI     Incline Stretch     Pro-Stretch     Hamstring 30 sec x 5 Start 9/11   Stair Stretch     Calf 1 (standing gastroc)    Calf 2 (standing soleus)         Isometrics     Dorsiflexion Start 9/11   Plantarflexion Start 9/11   Inversion Start 9/11   Eversion Start 9/11        PRE's     Dorsiflexion 3 x 10; red tband ^10/23   Plantarflexion 3 x 10; red tband ^10/23   Inversion 3 x 10, red tband ^10/23   Eversion 3 x 10, red tband ^10/23   Heel walk     Toe walk     SLR     Calf Raises on leg press 3 x 10; 40# Start 10/19   Step Up     Knee Extension     Hamstring Curls     Leg Pres 3 x 10; 100# Start 10/19   Seated SL heel raises 3 x 10 ^10/13   Bike 5 min Start 10/16   Balance:     SLB 2x30\" R    Rocker Board each 30 sec x 3 Start 9/29   BOSU     SLS Aeromat     Foam Roll     Plyoback     Tandem Stance     Biodex     Bike     Treadmill (Alter G) 10 min Start 10/19        Manual interventions     MFR posterior R calf and achilles tendon, FLH, post tib, plantar fascia 15 min Start  9/8       Therapeutic Exercise and NMR EXR  [x] (87971) Provided verbal/tactile cueing for activities related to strengthening, flexibility, endurance, ROM for improvements in LE, proximal hip, and core control with self care, mobility, lifting, ambulation. [x] (16595) Provided verbal/tactile cueing for activities related to improving balance, coordination, kinesthetic sense, posture, motor skill, proprioception  to assist with LE, proximal hip, and core control in self care, mobility, lifting, ambulation and eccentric single leg control. NMR and Therapeutic Activities:    [x] (08062 or 96752) Provided verbal/tactile cueing for activities related to improving balance, coordination, kinesthetic sense, posture, motor skill, proprioception and motor activation to allow for proper function of core, proximal hip and LE with self care and ADLs  [] (12432) Gait Re-education- Provided training and instruction to the patient for proper LE, core and proximal hip recruitment and positioning and eccentric body weight control with ambulation re-education including up and down stairs     Home Exercise Program:    [x] (60730) Reviewed/Progressed HEP activities related to strengthening, flexibility, endurance, ROM of core, proximal hip and LE for functional self-care, mobility, lifting and ambulation/stair navigation   [] (57152)Reviewed/Progressed HEP activities related to improving balance, coordination, kinesthetic sense, posture, motor skill, proprioception of core, proximal hip and LE for self care, mobility, lifting, and ambulation/stair navigation     Access Code: [email or web address]   URL: [email or web address]   Date: 09/08/2020   Prepared by: Bucky Monzon   Manual Treatments:  PROM / STM / Oscillations-Mobs:  G-I, II, III, IV (PA's, Inf., Post.)  [x] (06841) Provided manual therapy to mobilize LE, proximal hip and/or LS spine soft tissue/joints for the purpose of modulating pain, promoting relaxation,  increasing ROM, reducing/eliminating soft tissue swelling/inflammation/restriction, improving soft tissue extensibility and allowing for proper ROM for normal function with self care, mobility, lifting and ambulation. Modalities:  Cont US 1.4W/cm2, 3MHz at R Achilles 12 min  Charges:  Timed Code Treatment Minutes: 45   Total Treatment Minutes: 60     [] EVAL (LOW) 33456 (typically 20 minutes face-to-face)  [] EVAL (MOD) 96890 (typically 30 minutes face-to-face)  [] EVAL (HIGH) 93020 (typically 45 minutes face-to-face)  [] RE-EVAL     [x] AB(67524) x 1     [] IONTO  [x] NMR (22980) x 1    [] VASO  [x] Manual (99353) x 1     [] Other: US 12 min    [] TA x      [] Mech Traction (47392)  [] ES(attended) (92682)      [] ES (un) (62118):     GOALS:   Patient stated goal:  Reduce pain; return to walking and running  [] Progressing: [] Met: [] Not Met: [] Adjusted    Therapist goals for Patient:   Short Term Goals: To be achieved in: 2 weeks  1. Independent in HEP and progression per patient tolerance, in order to prevent re-injury. [] Progressing: [] Met: [] Not Met: [] Adjusted   2. Patient will have a decrease in pain to facilitate improvement in movement, function, and ADLs as indicated by Functional Deficits. [] Progressing: [] Met: [] Not Met: [] Adjusted    Long Term Goals: To be achieved in: 4 weeks  1. Disability index score of 20% or less for the LEFS to assist with reaching prior level of function. [] Progressing: [] Met: [] Not Met: [] Adjusted  2. Patient will demonstrate increased AROM to WNL to allow for proper joint functioning as indicated by patients Functional Deficits. [] Progressing: [] Met: [] Not Met: [] Adjusted  3.  Patient will demonstrate an increase in Strength to good proximal hip strength and control in LE to allow for proper functional mobility as indicated by patients Functional Deficits. [] Progressing: [] Met: [] Not Met: [] Adjusted  4. Patient will return to Independent functional activities without increased symptoms or restriction. [] Progressing: [] Met: [] Not Met: [] Adjusted  5. Patient will report returning to running for 1 mile or longer with no increase in pain. [] Progressing: [] Met: [] Not Met: [] Adjusted     Overall Progression Towards Functional goals/ Treatment Progress Update:  [] Patient is progressing as expected towards functional goals listed. [] Progression is slowed due to complexities/Impairments listed. [] Progression has been slowed due to co-morbidities. [x] Plan just implemented, too soon to assess goals progression <30days   [] Goals require adjustment due to lack of progress  [] Patient is not progressing as expected and requires additional follow up with physician  [] Other    Prognosis for POC: [x] Good [] Fair  [] Poor      Patient requires continued skilled intervention: [x] Yes  [] No     Treatment/Activity Tolerance:  [x] Patient able to complete treatment  [] Patient limited by fatigue  [] Patient limited by pain     [] Patient limited by other medical complications  [x] Other: 10/23 Pt making progress. Good tolerance to program today. Reported slight pain with jogging but was able to perform. Continue to progress as tolerated. Patient Education:  Icing to control pain and swelling. Use of tennis ball for self massage in calf and foot at home.                  PLAN: See eval  [x] Continue per plan of care [] Alter current plan (see comments above)  [] Plan of care initiated [] Hold pending MD visit [] Discharge      Electronically signed by:  Nereyda Orta, PT, DPT    Note: If patient does not return for scheduled/ recommended follow up visits, this note will serve as a discharge from care along with most recent update on progress.

## 2020-10-28 ENCOUNTER — APPOINTMENT (OUTPATIENT)
Dept: OCCUPATIONAL THERAPY | Age: 25
End: 2020-10-28
Payer: COMMERCIAL

## 2020-10-30 ENCOUNTER — HOSPITAL ENCOUNTER (OUTPATIENT)
Dept: PHYSICAL THERAPY | Age: 25
Setting detail: THERAPIES SERIES
Discharge: HOME | End: 2020-10-30
Payer: COMMERCIAL

## 2020-10-30 PROCEDURE — 97112 NEUROMUSCULAR REEDUCATION: CPT

## 2020-10-30 PROCEDURE — 97110 THERAPEUTIC EXERCISES: CPT

## 2020-10-30 PROCEDURE — 97140 MANUAL THERAPY 1/> REGIONS: CPT

## 2020-10-30 NOTE — FLOWSHEET NOTE
[facility], [address]    [place], [place]       Physical Therapy Daily Treatment Note  Date:  10/30/2020    Patient Name:  Sheron Mckenzie    :  1995  MRN: [id number]  Restrictions/Precautions:    Medical/Treatment Diagnosis Information:  · Diagnosis: M76.60 (ICD-10-CM) - Achilles tendon pain  · Treatment Diagnosis: M25.571 Pain in Right Ankle  Insurance/Certification information:  PT Insurance Information: [employer]  Physician Information:  Referring Practitioner: Tamia Sinclair MD  Has the plan of care been signed (Y/N):        []  Yes  [x]  No     Date of Patient follow up with Physician: prn with PCP      Is this a Progress Report:     []  Yes  [x]  No        If Yes:  Date Range for reporting period:  Beginning  Ending    Progress report will be due (10 Rx or 30 days whichever is less):        Recertification will be due (POC Duration  / 90 days whichever is less):          Visit # Insurance Allowable Auth Required   14  10/30 60 []  Yes [x]  No        Functional Scale: LEFS: 47.5% deficit   Date assessed: 20       Latex Allergy:  [x]NO      []YES  Preferred Language for Healthcare:   [x]English       []other:      Pain level:  0/10  Soreness present  10/30    SUBJECTIVE:  10/30 Patient states that his achilles is feeling better.        OBJECTIVE:   ROM   PROM AROM Comments    Left Right Left Right    Dorsiflexion   1 pre-tx, 3 post-tx Mild pain   Plantarflexion   62    Inversion   38    Eversion   12                      Strength  / Left Right Comments   Dorsiflexion 5 5    Plantarflexion 5 4+    Inversion 5 5    Eversion 5 4+        Reflexes/Sensation:    [x]Dermatomes/Myotomes intact    [x]Reflexes equal and normal bilaterally   []Other:    Joint mobility:    [x]Normal    []Hypo   []Hyper    Palpation: TTP along R achilles tendon, FHL amd post tib in talar tunnel and into plantar fascia; mild edema present 9/25    Functional Mobility/Transfers: Independent with ADL's and self care; unable to walk and run; unable to work out; pain and difficulty with stairs and squatting   9/8    Posture: WNL  9/8    Gait: (include devices/WB status) very mild antalgic gait present with walking boot w dec WB time on R  9/25                       [x] Patient history, allergies, meds reviewed. Medical chart reviewed. See intake form. 9/8    Review Of Systems (ROS):   [x]Performed Review of systems (Integumentary, CardioPulmonary, Neurological) by intake and observation. Intake form has been scanned into medical record. Patient has been instructed to contact their primary care physician regarding ROS issues if not already being addressed at this time.   9/8      RESTRICTIONS/PRECAUTIONS: None    Exercises/Interventions:     Exercise/Equipment Resistance/Repetitions Other comments   ROM     ABC'S     BAPS     Bottle Roll     Inversion/Eversion     Ankle Pumps 30x    Toe Curls     Rocks     Towels     Self TC joint mobilization with stool 30x Start 10/13   Stretching     Circles 30x CW and CCW    Toe Extension      Towel Pull 1 30 sec x 5 Gastroc    Towel Pull 2 30 sec x 5 Soleus    ERMI     Incline Stretch     Pro-Stretch     Hamstring 30 sec x 5 Start 9/11   Stair Stretch     Calf 1 (standing gastroc)    Calf 2 (standing soleus)         Isometrics     Dorsiflexion Start 9/11   Plantarflexion Start 9/11   Inversion Start 9/11   Eversion Start 9/11        PRE's     Dorsiflexion 3 x 10; red tband ^10/23   Plantarflexion 3 x 10; red tband ^10/23   Inversion 3 x 10, red tband ^10/23   Eversion 3 x 10, red tband ^10/23   Heel walk     Toe walk     SLR     Calf Raises on leg press 3 x 10; 40# Start 10/19   Step Up     Knee Extension     Hamstring Curls     Leg Pres 3 x 10; 100# Start 10/19   Seated SL heel raises 3 x 10 ^10/13   Bike 5 min Start 10/16   Balance:     SLB 2x30\" R    Rocker Board each 30 sec x 3 Start 9/29   BOSU     SLS Aeromat     Foam Roll     Plyoback     Tandem Stance     Biodex     Bike     Treadmill (Alter G) 10 min Start 10/19        Manual interventions     MFR posterior R calf and achilles tendon, FLH, post tib, plantar fascia 15 min Start  9/8       Therapeutic Exercise and NMR EXR  [x] (77391) Provided verbal/tactile cueing for activities related to strengthening, flexibility, endurance, ROM for improvements in LE, proximal hip, and core control with self care, mobility, lifting, ambulation. [x] (60438) Provided verbal/tactile cueing for activities related to improving balance, coordination, kinesthetic sense, posture, motor skill, proprioception  to assist with LE, proximal hip, and core control in self care, mobility, lifting, ambulation and eccentric single leg control. NMR and Therapeutic Activities:    [x] (82279 or 68657) Provided verbal/tactile cueing for activities related to improving balance, coordination, kinesthetic sense, posture, motor skill, proprioception and motor activation to allow for proper function of core, proximal hip and LE with self care and ADLs  [] (72492) Gait Re-education- Provided training and instruction to the patient for proper LE, core and proximal hip recruitment and positioning and eccentric body weight control with ambulation re-education including up and down stairs     Home Exercise Program:    [x] (01666) Reviewed/Progressed HEP activities related to strengthening, flexibility, endurance, ROM of core, proximal hip and LE for functional self-care, mobility, lifting and ambulation/stair navigation   [] (61367)Reviewed/Progressed HEP activities related to improving balance, coordination, kinesthetic sense, posture, motor skill, proprioception of core, proximal hip and LE for self care, mobility, lifting, and ambulation/stair navigation     Access Code: [email or web address]   URL: [email or web address]   Date: 09/08/2020   Prepared by: Yue Multani   Manual Treatments:  PROM / STM / Oscillations-Mobs:  G-I, II, III, IV (PA's, Inf., Post.)  [x] (09119) Provided manual therapy to mobilize LE, proximal hip and/or LS spine soft tissue/joints for the purpose of modulating pain, promoting relaxation,  increasing ROM, reducing/eliminating soft tissue swelling/inflammation/restriction, improving soft tissue extensibility and allowing for proper ROM for normal function with self care, mobility, lifting and ambulation. Modalities:  Cont US 1.4W/cm2, 3MHz at R Achilles 12 min  Charges:  Timed Code Treatment Minutes: 45   Total Treatment Minutes: 60     [] EVAL (LOW) 69776 (typically 20 minutes face-to-face)  [] EVAL (MOD) 24948 (typically 30 minutes face-to-face)  [] EVAL (HIGH) 70022 (typically 45 minutes face-to-face)  [] RE-EVAL     [x] FU(29097) x 1     [] IONTO  [x] NMR (58915) x 1    [] VASO  [x] Manual (71545) x 1     [] Other: US 12 min    [] TA x      [] Mech Traction (17451)  [] ES(attended) (55409)      [] ES (un) (65292):     GOALS:   Patient stated goal:  Reduce pain; return to walking and running  [] Progressing: [] Met: [] Not Met: [] Adjusted    Therapist goals for Patient:   Short Term Goals: To be achieved in: 2 weeks  1. Independent in HEP and progression per patient tolerance, in order to prevent re-injury. [] Progressing: [] Met: [] Not Met: [] Adjusted   2. Patient will have a decrease in pain to facilitate improvement in movement, function, and ADLs as indicated by Functional Deficits. [] Progressing: [] Met: [] Not Met: [] Adjusted    Long Term Goals: To be achieved in: 4 weeks  1. Disability index score of 20% or less for the LEFS to assist with reaching prior level of function. [] Progressing: [] Met: [] Not Met: [] Adjusted  2. Patient will demonstrate increased AROM to WNL to allow for proper joint functioning as indicated by patients Functional Deficits. [] Progressing: [] Met: [] Not Met: [] Adjusted  3.  Patient will demonstrate an increase in Strength to good proximal hip strength and control in LE to allow for proper functional mobility as indicated by patients Functional Deficits. [] Progressing: [] Met: [] Not Met: [] Adjusted  4. Patient will return to Independent functional activities without increased symptoms or restriction. [] Progressing: [] Met: [] Not Met: [] Adjusted  5. Patient will report returning to running for 1 mile or longer with no increase in pain. [] Progressing: [] Met: [] Not Met: [] Adjusted     Overall Progression Towards Functional goals/ Treatment Progress Update:  [] Patient is progressing as expected towards functional goals listed. [] Progression is slowed due to complexities/Impairments listed. [] Progression has been slowed due to co-morbidities. [x] Plan just implemented, too soon to assess goals progression <30days   [] Goals require adjustment due to lack of progress  [] Patient is not progressing as expected and requires additional follow up with physician  [] Other    Prognosis for POC: [x] Good [] Fair  [] Poor      Patient requires continued skilled intervention: [x] Yes  [] No     Treatment/Activity Tolerance:  [x] Patient able to complete treatment  [] Patient limited by fatigue  [] Patient limited by pain     [] Patient limited by other medical complications  [x] Other: 10/30 Pt making progress. Good tolerance to program today. Able to perform jogging today; no ambnormal gait pattern noted. Continue to progress as tolerated. Patient Education:  Icing to control pain and swelling. Use of tennis ball for self massage in calf and foot at home.                  PLAN: See eval  [x] Continue per plan of care [] Alter current plan (see comments above)  [] Plan of care initiated [] Hold pending MD visit [] Discharge      Electronically signed by:  Faye Zepeda PT, DPT    Note: If patient does not return for scheduled/ recommended follow up visits, this note will serve as a discharge from care along with most recent update on progress.

## 2020-11-02 ENCOUNTER — HOSPITAL ENCOUNTER (OUTPATIENT)
Dept: PHYSICAL THERAPY | Age: 25
Setting detail: THERAPIES SERIES
Discharge: HOME | End: 2020-11-02
Payer: COMMERCIAL

## 2020-11-02 PROCEDURE — 97140 MANUAL THERAPY 1/> REGIONS: CPT

## 2020-11-02 PROCEDURE — 97110 THERAPEUTIC EXERCISES: CPT

## 2020-11-02 PROCEDURE — 97112 NEUROMUSCULAR REEDUCATION: CPT

## 2020-11-02 NOTE — FLOWSHEET NOTE
[facility], [place]  [address], [place]      Physical Therapy Daily Treatment Note  Date:  2020    Patient Name:  Marino [place]    :  1995  MRN: [id number]  Restrictions/Precautions:    Medical/Treatment Diagnosis Information:  · Diagnosis: M76.60 (ICD-10-CM) - Achilles tendon pain  · Treatment Diagnosis: M25.571 Pain in Right Ankle  Insurance/Certification information:  PT Insurance Information: [employer]  Physician Information:  Referring Practitioner: Ricardo Contreras MD  Has the plan of care been signed (Y/N):        []  Yes  [x]  No     Date of Patient follow up with Physician: prn with PCP      Is this a Progress Report:     []  Yes  [x]  No        If Yes:  Date Range for reporting period:  Beginning  Ending    Progress report will be due (10 Rx or 30 days whichever is less):        Recertification will be due (POC Duration  / 90 days whichever is less):          Visit # Insurance Allowable Auth Required   15    60 []  Yes [x]  No        Functional Scale: LEFS: 47.5% deficit   Date assessed: 20       Latex Allergy:  [x]NO      []YES  Preferred Language for Healthcare:   [x]English       []other:      Pain level:  0/10  Soreness present      SUBJECTIVE:   Patient states that his heel gets a little sore at night but otherwise doing well.         OBJECTIVE:   ROM   PROM AROM Comments    Left Right Left Right    Dorsiflexion   1 pre-tx, 3 post-tx Mild pain   Plantarflexion   62    Inversion   38    Eversion   12                      Strength  /8 Left Right Comments   Dorsiflexion 5 5    Plantarflexion 5 4+    Inversion 5 5    Eversion 5 4+        Reflexes/Sensation:    [x]Dermatomes/Myotomes intact    [x]Reflexes equal and normal bilaterally   []Other:    Joint mobility:    [x]Normal    []Hypo   []Hyper    Palpation: TTP along R achilles tendon, FHL amd post tib in talar tunnel and into plantar fascia; mild edema present  9/25    Functional Mobility/Transfers: Independent with ADL's and self care; unable to walk and run; unable to work out; pain and difficulty with stairs and squatting   9/8    Posture: WNL  9/8    Gait: (include devices/WB status) very mild antalgic gait present with walking boot w dec WB time on R  9/25                       [x] Patient history, allergies, meds reviewed. Medical chart reviewed. See intake form. 9/8    Review Of Systems (ROS):   [x]Performed Review of systems (Integumentary, CardioPulmonary, Neurological) by intake and observation. Intake form has been scanned into medical record. Patient has been instructed to contact their primary care physician regarding ROS issues if not already being addressed at this time.   9/8      RESTRICTIONS/PRECAUTIONS: None    Exercises/Interventions:     Exercise/Equipment Resistance/Repetitions Other comments   ROM     ABC'S     BAPS     Bottle Roll     Inversion/Eversion     Ankle Pumps 30x    Toe Curls     Rocks     Towels     Self TC joint mobilization with stool 30x Start 10/13   Stretching     Circles 30x CW and CCW    Toe Extension      Towel Pull 1 30 sec x 5 Gastroc    Towel Pull 2 30 sec x 5 Soleus    ERMI     Incline Stretch     Pro-Stretch     Hamstring 30 sec x 5 Start 9/11   Stair Stretch     Calf 1 (standing gastroc)    Calf 2 (standing soleus)         Isometrics     Dorsiflexion Start 9/11   Plantarflexion Start 9/11   Inversion Start 9/11   Eversion Start 9/11        PRE's     Heel walk     Toe walk     SLR     Calf Raises on leg press 3 x 10; 40# Start 10/19   Step Up     Knee Extension     Hamstring Curls     Leg Pres 3 x 10; 100# Start 10/19   Seated SL heel raises 3 x 10 ^10/13   Bike 5 min Start 10/16   Balance:     SLB 3x30\" R    Rocker Board each 30 sec x 3 Start 9/29   BOSU     SLS     Aeromat     Foam Roll     Plyoback 3 x 10 Start 11/2   Tandem Stance     Biodex     Bike     Treadmill (Alter G) 10 min Start 10/19 Manual interventions     MFR posterior R calf and achilles tendon, FLH, post tib, plantar fascia 15 min Start  9/8       Therapeutic Exercise and NMR EXR  [x] (58262) Provided verbal/tactile cueing for activities related to strengthening, flexibility, endurance, ROM for improvements in LE, proximal hip, and core control with self care, mobility, lifting, ambulation. [x] (61086) Provided verbal/tactile cueing for activities related to improving balance, coordination, kinesthetic sense, posture, motor skill, proprioception  to assist with LE, proximal hip, and core control in self care, mobility, lifting, ambulation and eccentric single leg control. NMR and Therapeutic Activities:    [x] (86958 or 06701) Provided verbal/tactile cueing for activities related to improving balance, coordination, kinesthetic sense, posture, motor skill, proprioception and motor activation to allow for proper function of core, proximal hip and LE with self care and ADLs  [] (79395) Gait Re-education- Provided training and instruction to the patient for proper LE, core and proximal hip recruitment and positioning and eccentric body weight control with ambulation re-education including up and down stairs     Home Exercise Program:    [x] (28415) Reviewed/Progressed HEP activities related to strengthening, flexibility, endurance, ROM of core, proximal hip and LE for functional self-care, mobility, lifting and ambulation/stair navigation   [] (98472)Reviewed/Progressed HEP activities related to improving balance, coordination, kinesthetic sense, posture, motor skill, proprioception of core, proximal hip and LE for self care, mobility, lifting, and ambulation/stair navigation     Access Code: [email or web address]   URL: [email or web address]   Date: 09/08/2020   Prepared by: Unruly Mcclain   Manual Treatments:  PROM / STM / Oscillations-Mobs:  G-I, II, III, IV (PA's, Inf., Post.)  [x] (31414) Provided manual therapy to mobilize LE, proximal hip and/or LS spine soft tissue/joints for the purpose of modulating pain, promoting relaxation,  increasing ROM, reducing/eliminating soft tissue swelling/inflammation/restriction, improving soft tissue extensibility and allowing for proper ROM for normal function with self care, mobility, lifting and ambulation. Modalities:  Cont US 1.4W/cm2, 3MHz at R Achilles 12 min  Charges:  Timed Code Treatment Minutes: 45   Total Treatment Minutes: 56  10:52-11:48     [] EVAL (LOW) 05600 (typically 20 minutes face-to-face)  [] EVAL (MOD) 21801 (typically 30 minutes face-to-face)  [] EVAL (HIGH) 00316 (typically 45 minutes face-to-face)  [] RE-EVAL     [x] VK(49347) x 1     [] IONTO  [x] NMR (71979) x 1    [] VASO  [x] Manual (87669) x 1     [] Other: US 12 min    [] TA x      [] Mech Traction (65633)  [] ES(attended) (66039)      [] ES (un) (75903):     GOALS:   Patient stated goal:  Reduce pain; return to walking and running  [] Progressing: [] Met: [] Not Met: [] Adjusted    Therapist goals for Patient:   Short Term Goals: To be achieved in: 2 weeks  1. Independent in HEP and progression per patient tolerance, in order to prevent re-injury. [] Progressing: [] Met: [] Not Met: [] Adjusted   2. Patient will have a decrease in pain to facilitate improvement in movement, function, and ADLs as indicated by Functional Deficits. [] Progressing: [] Met: [] Not Met: [] Adjusted    Long Term Goals: To be achieved in: 4 weeks  1. Disability index score of 20% or less for the LEFS to assist with reaching prior level of function. [] Progressing: [] Met: [] Not Met: [] Adjusted  2. Patient will demonstrate increased AROM to WNL to allow for proper joint functioning as indicated by patients Functional Deficits. [] Progressing: [] Met: [] Not Met: [] Adjusted  3.  Patient will demonstrate an increase in Strength to good proximal hip strength and control in LE to allow for proper functional mobility as indicated by patients

## 2020-11-06 ENCOUNTER — HOSPITAL ENCOUNTER (OUTPATIENT)
Dept: PHYSICAL THERAPY | Age: 25
Setting detail: THERAPIES SERIES
Discharge: HOME | End: 2020-11-06
Payer: COMMERCIAL

## 2020-11-06 PROCEDURE — 97530 THERAPEUTIC ACTIVITIES: CPT

## 2020-11-06 PROCEDURE — 97110 THERAPEUTIC EXERCISES: CPT

## 2020-11-06 PROCEDURE — 97140 MANUAL THERAPY 1/> REGIONS: CPT

## 2020-11-06 PROCEDURE — 97112 NEUROMUSCULAR REEDUCATION: CPT

## 2020-11-06 NOTE — FLOWSHEET NOTE
achilles tendon, FHL amd post tib in talar tunnel and into plantar fascia; mild edema present  9/25    Functional Mobility/Transfers: Independent with ADL's and self care; unable to walk and run; unable to work out; pain and difficulty with stairs and squatting   9/8    Posture: WNL  9/8    Gait: (include devices/WB status) very mild antalgic gait present with walking boot w dec WB time on R  9/25                       [x] Patient history, allergies, meds reviewed. Medical chart reviewed. See intake form. 9/8    Review Of Systems (ROS):   [x]Performed Review of systems (Integumentary, CardioPulmonary, Neurological) by intake and observation. Intake form has been scanned into medical record. Patient has been instructed to contact their primary care physician regarding ROS issues if not already being addressed at this time.   9/8      RESTRICTIONS/PRECAUTIONS: None    Exercises/Interventions:     Exercise/Equipment Resistance/Repetitions Other comments   ROM     ABC'S     BAPS     Bottle Roll     Inversion/Eversion     Ankle Pumps 30x    Toe Curls     Rocks     Towels     Stretching        Toe Extension      Towel Pull 1 30 sec x 5 Gastroc    Towel Pull 2 30 sec x 5 Soleus    ERMI     Incline Stretch 30 sec x 3 Start 11/6   Pro-Stretch     Hamstring 30 sec x 5 Start 9/11   Stair Stretch     Calf 1 (standing gastroc)    Calf 2 (standing soleus)         Isometrics     Dorsiflexion Start 9/11   Plantarflexion Start 9/11   Inversion Start 9/11   Eversion Start 9/11        PRE's     Heel walk     Toe walk     SLR     Calf Raises on leg press 3 x 10; 80# ^11/6   Step Up     Knee Extension     Hamstring Curls     Leg Pres 3 x 10; 120# ^11/6   Seated SL heel raises 3 x 10 ^10/13   Bike 5 min Start 10/16   Balance:     SLB 3x30\" R    Rocker Board each 30 sec x 3 Start 9/29   BOSU     SLS     Aeromat     Foam Roll     Plyoback 3 x 10 Start 11/2   Tandem Stance     Biodex PS with targets; 3 min Start 11/6   Bike     Treadmill (Alter G) 10 min Start 10/19        Manual interventions     MFR posterior R calf and achilles tendon, FLH, post tib, plantar fascia 15 min Start  9/8       Therapeutic Exercise and NMR EXR  [x] (13614) Provided verbal/tactile cueing for activities related to strengthening, flexibility, endurance, ROM for improvements in LE, proximal hip, and core control with self care, mobility, lifting, ambulation. [x] (81731) Provided verbal/tactile cueing for activities related to improving balance, coordination, kinesthetic sense, posture, motor skill, proprioception  to assist with LE, proximal hip, and core control in self care, mobility, lifting, ambulation and eccentric single leg control. NMR and Therapeutic Activities:    [x] (77774 or 97980) Provided verbal/tactile cueing for activities related to improving balance, coordination, kinesthetic sense, posture, motor skill, proprioception and motor activation to allow for proper function of core, proximal hip and LE with self care and ADLs  [] (80315) Gait Re-education- Provided training and instruction to the patient for proper LE, core and proximal hip recruitment and positioning and eccentric body weight control with ambulation re-education including up and down stairs     Home Exercise Program:    [x] (72393) Reviewed/Progressed HEP activities related to strengthening, flexibility, endurance, ROM of core, proximal hip and LE for functional self-care, mobility, lifting and ambulation/stair navigation   [] (67914)Reviewed/Progressed HEP activities related to improving balance, coordination, kinesthetic sense, posture, motor skill, proprioception of core, proximal hip and LE for self care, mobility, lifting, and ambulation/stair navigation     Access Code: [email or web address]   URL: [email or web address]. [email or web address]   Date: 09/08/2020   Prepared by: Marck Wright   Manual Treatments:  PROM / STM / Oscillations-Mobs:  G-I, II, III, IV (PA's, Inf., Post.)  [x] (22270) Provided manual therapy to mobilize LE, proximal hip and/or LS spine soft tissue/joints for the purpose of modulating pain, promoting relaxation,  increasing ROM, reducing/eliminating soft tissue swelling/inflammation/restriction, improving soft tissue extensibility and allowing for proper ROM for normal function with self care, mobility, lifting and ambulation. Modalities:  Cont US 1.4W/cm2, 3MHz at R Achilles 12 min  Charges:  Timed Code Treatment Minutes: 53   Total Treatment Minutes: 57  10:51-11:48     [] EVAL (LOW) 17220 (typically 20 minutes face-to-face)  [] EVAL (MOD) 95820 (typically 30 minutes face-to-face)  [] EVAL (HIGH) 98243 (typically 45 minutes face-to-face)  [] RE-EVAL     [x] KX(77559) x 1     [] IONTO  [x] NMR (39933) x 1    [] VASO  [x] Manual (65735) x 1     [] Other: US 12 min    [x] TA x 1     [] Mech Traction (38343)  [] ES(attended) (27289)      [] ES (un) (87576):     GOALS:   Patient stated goal:  Reduce pain; return to walking and running  [] Progressing: [] Met: [] Not Met: [] Adjusted    Therapist goals for Patient:   Short Term Goals: To be achieved in: 2 weeks  1. Independent in HEP and progression per patient tolerance, in order to prevent re-injury. [] Progressing: [] Met: [] Not Met: [] Adjusted   2. Patient will have a decrease in pain to facilitate improvement in movement, function, and ADLs as indicated by Functional Deficits. [] Progressing: [] Met: [] Not Met: [] Adjusted    Long Term Goals: To be achieved in: 4 weeks  1. Disability index score of 20% or less for the LEFS to assist with reaching prior level of function. [] Progressing: [] Met: [] Not Met: [] Adjusted  2. Patient will demonstrate increased AROM to WNL to allow for proper joint functioning as indicated by patients Functional Deficits. [] Progressing: [] Met: [] Not Met: [] Adjusted  3.  Patient will demonstrate an increase in Strength to good proximal hip strength and control in LE to allow for proper functional mobility as indicated by patients Functional Deficits. [] Progressing: [] Met: [] Not Met: [] Adjusted  4. Patient will return to Independent functional activities without increased symptoms or restriction. [] Progressing: [] Met: [] Not Met: [] Adjusted  5. Patient will report returning to running for 1 mile or longer with no increase in pain. [] Progressing: [] Met: [] Not Met: [] Adjusted     Overall Progression Towards Functional goals/ Treatment Progress Update:  [] Patient is progressing as expected towards functional goals listed. [] Progression is slowed due to complexities/Impairments listed. [] Progression has been slowed due to co-morbidities. [x] Plan just implemented, too soon to assess goals progression <30days   [] Goals require adjustment due to lack of progress  [] Patient is not progressing as expected and requires additional follow up with physician  [] Other    Prognosis for POC: [x] Good [] Fair  [] Poor      Patient requires continued skilled intervention: [x] Yes  [] No     Treatment/Activity Tolerance:  [x] Patient able to complete treatment  [] Patient limited by fatigue  [] Patient limited by pain     [] Patient limited by other medical complications  [x] Other: 11/6 Pt making progress. Good tolerance to program today. Fatigued at end of session. Continue to progress as tolerated. Patient Education:  Icing to control pain and swelling. Use of tennis ball for self massage in calf and foot at home. PLAN: See eval  [x] Continue per plan of care [] Alter current plan (see comments above)  [] Plan of care initiated [] Hold pending MD visit [] Discharge      Electronically signed by:  Tayo Beckford, PT, DPT    Note: If patient does not return for scheduled/ recommended follow up visits, this note will serve as a discharge from care along with most recent update on progress.

## 2020-11-09 ENCOUNTER — HOSPITAL ENCOUNTER (OUTPATIENT)
Dept: PHYSICAL THERAPY | Age: 25
Setting detail: THERAPIES SERIES
Discharge: HOME | End: 2020-11-09
Payer: COMMERCIAL

## 2020-11-09 PROCEDURE — 97112 NEUROMUSCULAR REEDUCATION: CPT

## 2020-11-09 PROCEDURE — 97140 MANUAL THERAPY 1/> REGIONS: CPT

## 2020-11-09 PROCEDURE — 97110 THERAPEUTIC EXERCISES: CPT

## 2020-11-09 PROCEDURE — 97530 THERAPEUTIC ACTIVITIES: CPT

## 2020-11-09 NOTE — FLOWSHEET NOTE
[facility], [employer]  [address], [place] [place], [place]      Physical Therapy Daily Treatment Note  Date:  2020    Patient Name:  Inga Moscoso    :  1995  MRN: [id number]  Restrictions/Precautions:    Medical/Treatment Diagnosis Information:  · Diagnosis: M76.60 (ICD-10-CM) - Achilles tendon pain  · Treatment Diagnosis: M25.571 Pain in Right Ankle  Insurance/Certification information:  PT Insurance Information: [place]  Physician Information:  Referring Practitioner: Lawyer Karena MD  Has the plan of care been signed (Y/N):        []  Yes  [x]  No     Date of Patient follow up with Physician: prn with PCP      Is this a Progress Report:     []  Yes  [x]  No        If Yes:  Date Range for reporting period:  Beginning  Ending    Progress report will be due (10 Rx or 30 days whichever is less):        Recertification will be due (POC Duration  / 90 days whichever is less):          Visit # Insurance Allowable Auth Required   16    60 []  Yes [x]  No        Functional Scale: LEFS: 47.5% deficit   Date assessed: 20       Latex Allergy:  [x]NO      []YES  Preferred Language for Healthcare:   [x]English       []other:      Pain level:  0/10  Soreness present      SUBJECTIVE:   Patient states that he has been walking and his ankle feels pretty good. He states that he doesn't really have pain but when he does too much he gets sore.         OBJECTIVE:   ROM   PROM AROM Comments    Left Right Left Right    Dorsiflexion   1 pre-tx, 3 post-tx Mild pain   Plantarflexion   62    Inversion   38    Eversion   12                      Strength  / Left Right Comments   Dorsiflexion 5 5    Plantarflexion 5 4+    Inversion 5 5    Eversion 5 4+        Reflexes/Sensation:    [x]Dermatomes/Myotomes intact    [x]Reflexes equal and normal bilaterally   []Other:    Joint mobility:    [x]Normal    []Hypo   []Hyper    Palpation: TTP along R achilles tendon, FHL amd post tib in talar tunnel and into plantar fascia; mild edema present  9/25    Functional Mobility/Transfers: Independent with ADL's and self care; unable to walk and run; unable to work out; pain and difficulty with stairs and squatting   9/8    Posture: WNL  9/8    Gait: (include devices/WB status) very mild antalgic gait present with walking boot w dec WB time on R  9/25                       [x] Patient history, allergies, meds reviewed. Medical chart reviewed. See intake form. 9/8    Review Of Systems (ROS):   [x]Performed Review of systems (Integumentary, CardioPulmonary, Neurological) by intake and observation. Intake form has been scanned into medical record. Patient has been instructed to contact their primary care physician regarding ROS issues if not already being addressed at this time.   9/8      RESTRICTIONS/PRECAUTIONS: None    Exercises/Interventions:     Exercise/Equipment Resistance/Repetitions Other comments   ROM     ABC'S     BAPS     Bottle Roll     Inversion/Eversion     Ankle Pumps 30x    Toe Curls     Rocks     Towels     Stretching        Toe Extension      Towel Pull 1 30 sec x 5 Gastroc    Towel Pull 2 30 sec x 5 Soleus    ERMI     Incline Stretch 30 sec x 3 Start 11/6   Pro-Stretch     Hamstring 30 sec x 5 Start 9/11   Stair Stretch     Calf 1 (standing gastroc)    Calf 2 (standing soleus)         Isometrics     Dorsiflexion Start 9/11   Plantarflexion Start 9/11   Inversion Start 9/11   Eversion Start 9/11        PRE's     Heel walk     Toe walk     SLR     Calf Raises on leg press 3 x 10; 80# ^11/6   Step Up     Knee Extension 3 x 10; 45# Start 11/9   Hamstring Curls 3 x 10; 45# Start 11/9   Leg Pres 3 x 10; 120# ^11/6   Seated SL heel raises 3 x 10 ^10/13   Bike 5 min Start 10/16   Balance:     SLB 3x30\" R    Rocker Board each 30 sec x 3 Start 9/29   BOSU     SLS     Aeromat     Foam Roll     Plyoback 3 x 10 Start 11/2   Tandem Stance     Biodex PS with targets; 3 min Start 11/6   Bike     Treadmill (Alter G) 10 min Start 10/19        Manual interventions     MFR posterior R calf and achilles tendon, FLH, post tib, plantar fascia 15 min Start  9/8       Therapeutic Exercise and NMR EXR  [x] (97899) Provided verbal/tactile cueing for activities related to strengthening, flexibility, endurance, ROM for improvements in LE, proximal hip, and core control with self care, mobility, lifting, ambulation. [x] (13874) Provided verbal/tactile cueing for activities related to improving balance, coordination, kinesthetic sense, posture, motor skill, proprioception  to assist with LE, proximal hip, and core control in self care, mobility, lifting, ambulation and eccentric single leg control. NMR and Therapeutic Activities:    [x] (23748 or 71490) Provided verbal/tactile cueing for activities related to improving balance, coordination, kinesthetic sense, posture, motor skill, proprioception and motor activation to allow for proper function of core, proximal hip and LE with self care and ADLs  [] (48617) Gait Re-education- Provided training and instruction to the patient for proper LE, core and proximal hip recruitment and positioning and eccentric body weight control with ambulation re-education including up and down stairs     Home Exercise Program:    [x] (96301) Reviewed/Progressed HEP activities related to strengthening, flexibility, endurance, ROM of core, proximal hip and LE for functional self-care, mobility, lifting and ambulation/stair navigation   [] (15017)Reviewed/Progressed HEP activities related to improving balance, coordination, kinesthetic sense, posture, motor skill, proprioception of core, proximal hip and LE for self care, mobility, lifting, and ambulation/stair navigation     Access Code: [email or web address]   URL: [email or web address]   Date: 09/08/2020   Prepared by: Nereyda Orta   Manual Treatments:  PROM / STM / Oscillations-Mobs:  G-I, II, III, IV (PA's, Inf., Post.)  [x] (10537) Provided manual therapy to mobilize LE, proximal hip and/or LS spine soft tissue/joints for the purpose of modulating pain, promoting relaxation,  increasing ROM, reducing/eliminating soft tissue swelling/inflammation/restriction, improving soft tissue extensibility and allowing for proper ROM for normal function with self care, mobility, lifting and ambulation. Modalities:  Cont US 1.4W/cm2, 3MHz at R Achilles 12 min  Charges:  Timed Code Treatment Minutes: 53   Total Treatment Minutes: 70  10:05-11:15     [] EVAL (LOW) 79862 (typically 20 minutes face-to-face)  [] EVAL (MOD) 06010 (typically 30 minutes face-to-face)  [] EVAL (HIGH) 45948 (typically 45 minutes face-to-face)  [] RE-EVAL     [x] TX(18819) x 1     [] IONTO  [x] NMR (86766) x 1    [] VASO  [x] Manual (30722) x 1     [] Other: US 12 min    [x] TA x 1     [] Mech Traction (11963)  [] ES(attended) (06208)      [] ES (un) (35413):     GOALS:   Patient stated goal:  Reduce pain; return to walking and running  [] Progressing: [] Met: [] Not Met: [] Adjusted    Therapist goals for Patient:   Short Term Goals: To be achieved in: 2 weeks  1. Independent in HEP and progression per patient tolerance, in order to prevent re-injury. [] Progressing: [] Met: [] Not Met: [] Adjusted   2. Patient will have a decrease in pain to facilitate improvement in movement, function, and ADLs as indicated by Functional Deficits. [] Progressing: [] Met: [] Not Met: [] Adjusted    Long Term Goals: To be achieved in: 4 weeks  1. Disability index score of 20% or less for the LEFS to assist with reaching prior level of function. [] Progressing: [] Met: [] Not Met: [] Adjusted  2. Patient will demonstrate increased AROM to WNL to allow for proper joint functioning as indicated by patients Functional Deficits. [] Progressing: [] Met: [] Not Met: [] Adjusted  3.  Patient will demonstrate an increase in Strength to good proximal hip strength and control in LE to allow for proper functional mobility as indicated by patients Functional Deficits. [] Progressing: [] Met: [] Not Met: [] Adjusted  4. Patient will return to Independent functional activities without increased symptoms or restriction. [] Progressing: [] Met: [] Not Met: [] Adjusted  5. Patient will report returning to running for 1 mile or longer with no increase in pain. [] Progressing: [] Met: [] Not Met: [] Adjusted     Overall Progression Towards Functional goals/ Treatment Progress Update:  [] Patient is progressing as expected towards functional goals listed. [] Progression is slowed due to complexities/Impairments listed. [] Progression has been slowed due to co-morbidities. [x] Plan just implemented, too soon to assess goals progression <30days   [] Goals require adjustment due to lack of progress  [] Patient is not progressing as expected and requires additional follow up with physician  [] Other    Prognosis for POC: [x] Good [] Fair  [] Poor      Patient requires continued skilled intervention: [x] Yes  [] No     Treatment/Activity Tolerance:  [x] Patient able to complete treatment  [] Patient limited by fatigue  [] Patient limited by pain     [] Patient limited by other medical complications  [x] Other: 11/9 Pt making progress. Good tolerance to program today. Fatigued at end of session. Continue to progress as tolerated. Patient Education:  Icing to control pain and swelling. Use of tennis ball for self massage in calf and foot at home. PLAN: See eval  [x] Continue per plan of care [] Alter current plan (see comments above)  [] Plan of care initiated [] Hold pending MD visit [] Discharge      Electronically signed by:  Gamaliel Perez, PT, DPT    Note: If patient does not return for scheduled/ recommended follow up visits, this note will serve as a discharge from care along with most recent update on progress.

## 2020-11-13 ENCOUNTER — HOSPITAL ENCOUNTER (OUTPATIENT)
Dept: PHYSICAL THERAPY | Age: 25
Setting detail: THERAPIES SERIES
Discharge: HOME | End: 2020-11-13
Payer: COMMERCIAL

## 2020-11-13 PROCEDURE — 97110 THERAPEUTIC EXERCISES: CPT

## 2020-11-13 PROCEDURE — 97112 NEUROMUSCULAR REEDUCATION: CPT

## 2020-11-13 PROCEDURE — 97140 MANUAL THERAPY 1/> REGIONS: CPT

## 2020-11-13 PROCEDURE — 97530 THERAPEUTIC ACTIVITIES: CPT

## 2020-11-13 NOTE — FLOWSHEET NOTE
interventions     MFR posterior R calf and achilles tendon, FLH, post tib, plantar fascia 15 min Start  9/8       Therapeutic Exercise and NMR EXR  [x] (72260) Provided verbal/tactile cueing for activities related to strengthening, flexibility, endurance, ROM for improvements in LE, proximal hip, and core control with self care, mobility, lifting, ambulation. [x] (90648) Provided verbal/tactile cueing for activities related to improving balance, coordination, kinesthetic sense, posture, motor skill, proprioception  to assist with LE, proximal hip, and core control in self care, mobility, lifting, ambulation and eccentric single leg control. NMR and Therapeutic Activities:    [x] (90522 or 27841) Provided verbal/tactile cueing for activities related to improving balance, coordination, kinesthetic sense, posture, motor skill, proprioception and motor activation to allow for proper function of core, proximal hip and LE with self care and ADLs  [] (27223) Gait Re-education- Provided training and instruction to the patient for proper LE, core and proximal hip recruitment and positioning and eccentric body weight control with ambulation re-education including up and down stairs     Home Exercise Program:    [x] (16591) Reviewed/Progressed HEP activities related to strengthening, flexibility, endurance, ROM of core, proximal hip and LE for functional self-care, mobility, lifting and ambulation/stair navigation   [] (01827)Reviewed/Progressed HEP activities related to improving balance, coordination, kinesthetic sense, posture, motor skill, proprioception of core, proximal hip and LE for self care, mobility, lifting, and ambulation/stair navigation     Access Code: [email or web address]   URL: [email or web address]   Date: 09/08/2020   Prepared by: Marck Wright   Manual Treatments:  PROM / STM / Oscillations-Mobs:  G-I, II, III, IV (PA's, Inf., Post.)  [x] (92310) Provided manual therapy to mobilize LE, proximal hip and/or LS spine soft tissue/joints for the purpose of modulating pain, promoting relaxation,  increasing ROM, reducing/eliminating soft tissue swelling/inflammation/restriction, improving soft tissue extensibility and allowing for proper ROM for normal function with self care, mobility, lifting and ambulation. Modalities:  Cont US 1.4W/cm2, 3MHz at R Achilles 12 min  Charges:  Timed Code Treatment Minutes: 53   Total Treatment Minutes: 60  8:30-9:30     [] EVAL (LOW) 90966 (typically 20 minutes face-to-face)  [] EVAL (MOD) 93032 (typically 30 minutes face-to-face)  [] EVAL (HIGH) 85343 (typically 45 minutes face-to-face)  [] RE-EVAL     [x] YD(50313) x 1     [] IONTO  [x] NMR (82995) x 1    [] VASO  [x] Manual (29269) x 1     [] Other: US 12 min    [x] TA x 1     [] Mech Traction (81339)  [] ES(attended) (06520)      [] ES (un) (19252):     GOALS:   Patient stated goal:  Reduce pain; return to walking and running  [] Progressing: [] Met: [] Not Met: [] Adjusted    Therapist goals for Patient:   Short Term Goals: To be achieved in: 2 weeks  1. Independent in HEP and progression per patient tolerance, in order to prevent re-injury. [] Progressing: [] Met: [] Not Met: [] Adjusted   2. Patient will have a decrease in pain to facilitate improvement in movement, function, and ADLs as indicated by Functional Deficits. [] Progressing: [] Met: [] Not Met: [] Adjusted    Long Term Goals: To be achieved in: 4 weeks  1. Disability index score of 20% or less for the LEFS to assist with reaching prior level of function. [] Progressing: [] Met: [] Not Met: [] Adjusted  2. Patient will demonstrate increased AROM to WNL to allow for proper joint functioning as indicated by patients Functional Deficits. [] Progressing: [] Met: [] Not Met: [] Adjusted  3.  Patient will demonstrate an increase in Strength to good proximal hip strength and control in LE to allow for proper functional mobility as indicated by patients Functional

## 2020-11-16 ENCOUNTER — HOSPITAL ENCOUNTER (OUTPATIENT)
Dept: PHYSICAL THERAPY | Age: 25
Setting detail: THERAPIES SERIES
Discharge: HOME | End: 2020-11-16
Payer: COMMERCIAL

## 2020-11-16 PROCEDURE — 97112 NEUROMUSCULAR REEDUCATION: CPT

## 2020-11-16 PROCEDURE — 97530 THERAPEUTIC ACTIVITIES: CPT

## 2020-11-16 PROCEDURE — 97110 THERAPEUTIC EXERCISES: CPT

## 2020-11-16 PROCEDURE — 97140 MANUAL THERAPY 1/> REGIONS: CPT

## 2020-11-16 NOTE — FLOWSHEET NOTE
[facility] [address] [place] [address] [facility] [address] [place], [place]      Physical Therapy Daily Treatment Note  Date:  2020    Patient Name:  Twila Green    :  1995  MRN: [id number]  Restrictions/Precautions:    Medical/Treatment Diagnosis Information:  · Diagnosis: M76.60 (ICD-10-CM) - Achilles tendon pain  · Treatment Diagnosis: M25.571 Pain in Right Ankle  Insurance/Certification information:  PT Insurance Information: [place]  Physician Information:  Referring Practitioner: Kervin Alarcon MD  Has the plan of care been signed (Y/N):        []  Yes  [x]  No     Date of Patient follow up with Physician: prn with PCP      Is this a Progress Report:     []  Yes  [x]  No        If Yes:  Date Range for reporting period:  Beginning  Ending    Progress report will be due (10 Rx or 30 days whichever is less):        Recertification will be due (POC Duration  / 90 days whichever is less):          Visit # Insurance Allowable Auth Required   19   60 []  Yes [x]  No        Functional Scale: LEFS: 47.5% deficit   Date assessed: 20       Latex Allergy:  [x]NO      []YES  Preferred Language for Healthcare:   [x]English       []other:      Pain level:  0/10  Soreness present      SUBJECTIVE:   Patient states that he was a little sore last night but is fine today.        OBJECTIVE:   ROM   PROM AROM Comments    Left Right Left Right    Dorsiflexion   1 pre-tx, 3 post-tx Mild pain   Plantarflexion   62    Inversion   38    Eversion   12                      Strength  /8 Left Right Comments   Dorsiflexion 5 5    Plantarflexion 5 4+    Inversion 5 5    Eversion 5 4+        Reflexes/Sensation:    [x]Dermatomes/Myotomes intact    [x]Reflexes equal and normal bilaterally   []Other:    Joint mobility:    [x]Normal    []Hypo   []Hyper    Palpation: TTP along R achilles tendon, FHL amd post tib in talar tunnel and into plantar fascia; mild edema present  9/25    Functional Mobility/Transfers: Independent with ADL's and self care; unable to walk and run; unable to work out; pain and difficulty with stairs and squatting   9/8    Posture: WNL  9/8    Gait: (include devices/WB status) very mild antalgic gait present with walking boot w dec WB time on R  9/25                       [x] Patient history, allergies, meds reviewed. Medical chart reviewed. See intake form. 9/8    Review Of Systems (ROS):   [x]Performed Review of systems (Integumentary, CardioPulmonary, Neurological) by intake and observation. Intake form has been scanned into medical record. Patient has been instructed to contact their primary care physician regarding ROS issues if not already being addressed at this time.   9/8      RESTRICTIONS/PRECAUTIONS: None    Exercises/Interventions:     Exercise/Equipment Resistance/Repetitions Other comments   ROM     ABC'S     BAPS     Bottle Roll     Inversion/Eversion     Ankle Pumps 30x    Toe Curls     Rocks     Towels     Stretching        Toe Extension      Towel Pull 1 30 sec x 5 Gastroc    Towel Pull 2 30 sec x 5 Soleus    ERMI     Incline Stretch 30 sec x 3 Start 11/6   Pro-Stretch     Hamstring 30 sec x 5 Start 9/11   Stair Stretch     Calf 1 (standing gastroc)    Calf 2 (standing soleus)         Isometrics     Dorsiflexion Start 9/11   Plantarflexion Start 9/11   Inversion Start 9/11   Eversion Start 9/11        PRE's     Heel walk     Toe walk     SLR     Calf Raises on leg press 3 x 10; 80# ^11/6   Step Up     Knee Extension 3 x 10; 45# Start 11/9   Hamstring Curls 3 x 10; 45# Start 11/9   Leg Pres 3 x 10; 120# ^11/6   Seated SL heel raises 3 x 10 ^10/13   Bike 5 min Start 10/16   Balance:     SLB 3x30\" R    Rocker Board each 30 sec x 3 Start 9/29   BOSU     SLS     Aeromat     Foam Roll     Plyoback 3 x 10 Start 11/2   Tandem Stance     Biodex PS with targets; 3 min Start 11/6   Bike     Treadmill (Alter G) 10 min Start 10/19 Manual interventions     MFR posterior R calf and achilles tendon, FLH, post tib, plantar fascia 15 min Start  9/8       Therapeutic Exercise and NMR EXR  [x] (54653) Provided verbal/tactile cueing for activities related to strengthening, flexibility, endurance, ROM for improvements in LE, proximal hip, and core control with self care, mobility, lifting, ambulation. [x] (36446) Provided verbal/tactile cueing for activities related to improving balance, coordination, kinesthetic sense, posture, motor skill, proprioception  to assist with LE, proximal hip, and core control in self care, mobility, lifting, ambulation and eccentric single leg control. NMR and Therapeutic Activities:    [x] (21680 or 29959) Provided verbal/tactile cueing for activities related to improving balance, coordination, kinesthetic sense, posture, motor skill, proprioception and motor activation to allow for proper function of core, proximal hip and LE with self care and ADLs  [] (67824) Gait Re-education- Provided training and instruction to the patient for proper LE, core and proximal hip recruitment and positioning and eccentric body weight control with ambulation re-education including up and down stairs     Home Exercise Program:    [x] (62883) Reviewed/Progressed HEP activities related to strengthening, flexibility, endurance, ROM of core, proximal hip and LE for functional self-care, mobility, lifting and ambulation/stair navigation   [] (39117)Reviewed/Progressed HEP activities related to improving balance, coordination, kinesthetic sense, posture, motor skill, proprioception of core, proximal hip and LE for self care, mobility, lifting, and ambulation/stair navigation     Access Code: [email or web address]   URL: [email or web address]   Date: 09/08/2020   Prepared by: Yue Multani   Manual Treatments:  PROM / STM / Oscillations-Mobs:  G-I, II, III, IV (PA's, Inf., Post.)  [x] (30462) Provided manual therapy to mobilize LE, proximal hip and/or LS spine soft tissue/joints for the purpose of modulating pain, promoting relaxation,  increasing ROM, reducing/eliminating soft tissue swelling/inflammation/restriction, improving soft tissue extensibility and allowing for proper ROM for normal function with self care, mobility, lifting and ambulation. Modalities:  Cont US 1.4W/cm2, 3MHz at R Achilles 12 min  Charges:  Timed Code Treatment Minutes: 53   Total Treatment Minutes: 54  11:00-11:54     [] EVAL (LOW) 93376 (typically 20 minutes face-to-face)  [] EVAL (MOD) 91860 (typically 30 minutes face-to-face)  [] EVAL (HIGH) 77245 (typically 45 minutes face-to-face)  [] RE-EVAL     [x] GS(37567) x 1     [] IONTO  [x] NMR (21600) x 1    [] VASO  [x] Manual (66148) x 1     [] Other: US 12 min    [x] TA x 1     [] Mech Traction (30570)  [] ES(attended) (22938)      [] ES (un) (53765):     GOALS:   Patient stated goal:  Reduce pain; return to walking and running  [] Progressing: [] Met: [] Not Met: [] Adjusted    Therapist goals for Patient:   Short Term Goals: To be achieved in: 2 weeks  1. Independent in HEP and progression per patient tolerance, in order to prevent re-injury. [] Progressing: [] Met: [] Not Met: [] Adjusted   2. Patient will have a decrease in pain to facilitate improvement in movement, function, and ADLs as indicated by Functional Deficits. [] Progressing: [] Met: [] Not Met: [] Adjusted    Long Term Goals: To be achieved in: 4 weeks  1. Disability index score of 20% or less for the LEFS to assist with reaching prior level of function. [] Progressing: [] Met: [] Not Met: [] Adjusted  2. Patient will demonstrate increased AROM to WNL to allow for proper joint functioning as indicated by patients Functional Deficits. [] Progressing: [] Met: [] Not Met: [] Adjusted  3.  Patient will demonstrate an increase in Strength to good proximal hip strength and control in LE to allow for proper functional mobility as indicated by patients Functional Deficits. [] Progressing: [] Met: [] Not Met: [] Adjusted  4. Patient will return to Independent functional activities without increased symptoms or restriction. [] Progressing: [] Met: [] Not Met: [] Adjusted  5. Patient will report returning to running for 1 mile or longer with no increase in pain. [] Progressing: [] Met: [] Not Met: [] Adjusted     Overall Progression Towards Functional goals/ Treatment Progress Update:  [] Patient is progressing as expected towards functional goals listed. [] Progression is slowed due to complexities/Impairments listed. [] Progression has been slowed due to co-morbidities. [x] Plan just implemented, too soon to assess goals progression <30days   [] Goals require adjustment due to lack of progress  [] Patient is not progressing as expected and requires additional follow up with physician  [] Other    Prognosis for POC: [x] Good [] Fair  [] Poor      Patient requires continued skilled intervention: [x] Yes  [] No     Treatment/Activity Tolerance:  [x] Patient able to complete treatment  [] Patient limited by fatigue  [] Patient limited by pain     [] Patient limited by other medical complications  [x] Other: 11/16 Pt making progress. Good tolerance to program today. Fatigued at end of session. Continue to progress as tolerated. Patient Education:  Icing to control pain and swelling. Use of tennis ball for self massage in calf and foot at home. PLAN: See eval  [x] Continue per plan of care [] Alter current plan (see comments above)  [] Plan of care initiated [] Hold pending MD visit [] Discharge      Electronically signed by:  Fartun Ponce, PT, DPT    Note: If patient does not return for scheduled/ recommended follow up visits, this note will serve as a discharge from care along with most recent update on progress.

## 2020-11-17 NOTE — TELEPHONE ENCOUNTER
Medication:   Requested Prescriptions     Pending Prescriptions Disp Refills    diclofenac (VOLTAREN) 75 MG EC tablet [Pharmacy Med Name: DICLOFENAC SODIUM 75MG DR TABLETS] 60 tablet 3     Sig: TAKE 1 TABLET BY MOUTH TWICE DAILY        Last Filled:      Patient Phone Number: [phone] (home)     Last appt: 9/18/2020   Next appt: Visit date not found    Last OARRS: No flowsheet data found.     Preferred Pharmacy:   [address], [address] [address]  Phone: [phone] Fax: [phone]

## 2020-11-18 RX ORDER — DICLOFENAC SODIUM 75 MG/1
TABLET, DELAYED RELEASE ORAL
Qty: 60 TABLET | Refills: 3 | Status: SHIPPED | OUTPATIENT
Start: 2020-11-18 | End: 2021-08-18

## 2020-11-20 ENCOUNTER — HOSPITAL ENCOUNTER (OUTPATIENT)
Dept: PHYSICAL THERAPY | Age: 25
Setting detail: THERAPIES SERIES
Discharge: HOME | End: 2020-11-20
Payer: COMMERCIAL

## 2020-11-20 PROCEDURE — 97140 MANUAL THERAPY 1/> REGIONS: CPT

## 2020-11-20 PROCEDURE — 97110 THERAPEUTIC EXERCISES: CPT

## 2020-11-20 PROCEDURE — 97112 NEUROMUSCULAR REEDUCATION: CPT

## 2020-11-20 PROCEDURE — 97530 THERAPEUTIC ACTIVITIES: CPT

## 2020-11-20 NOTE — FLOWSHEET NOTE
The [address] [facility] and [facility], [place]  [address], [place], [place]      Physical Therapy Daily Treatment Note  Date:  2020    Patient Name:  Twila Green    :  1995  MRN: [id number]  Restrictions/Precautions:    Medical/Treatment Diagnosis Information:  · Diagnosis: M76.60 (ICD-10-CM) - Achilles tendon pain  · Treatment Diagnosis: M25.571 Pain in Right Ankle  Insurance/Certification information:  PT Insurance Information: [place]  Physician Information:  Referring Practitioner: Kervin Alarcon MD  Has the plan of care been signed (Y/N):        []  Yes  [x]  No     Date of Patient follow up with Physician: prn with PCP      Is this a Progress Report:     []  Yes  [x]  No        If Yes:  Date Range for reporting period:  Beginning  Ending    Progress report will be due (10 Rx or 30 days whichever is less):        Recertification will be due (POC Duration  / 90 days whichever is less):          Visit # Insurance Allowable Auth Required    60 []  Yes [x]  No        Functional Scale: LEFS: 47.5% deficit   Date assessed: 20       Latex Allergy:  [x]NO      []YES  Preferred Language for Healthcare:   [x]English       []other:      Pain level:  0/10  Soreness present      SUBJECTIVE:   Patient states that he was a little sore last night but is fine today.        OBJECTIVE:   ROM   PROM AROM Comments    Left Right Left Right    Dorsiflexion   1 pre-tx, 3 post-tx Mild pain   Plantarflexion   62    Inversion   38    Eversion   12                      Strength  /8 Left Right Comments   Dorsiflexion 5 5    Plantarflexion 5 4+    Inversion 5 5    Eversion 5 4+        Reflexes/Sensation:    [x]Dermatomes/Myotomes intact    [x]Reflexes equal and normal bilaterally   []Other:    Joint mobility:    [x]Normal    []Hypo   []Hyper    Palpation: TTP along R achilles tendon, FHL amd post tib in talar tunnel and into plantar fascia; mild edema present  9/25    Functional Mobility/Transfers: Independent with ADL's and self care; unable to walk and run; unable to work out; pain and difficulty with stairs and squatting   9/8    Posture: WNL  9/8    Gait: (include devices/WB status) very mild antalgic gait present with walking boot w dec WB time on R  9/25                       [x] Patient history, allergies, meds reviewed. Medical chart reviewed. See intake form. 9/8    Review Of Systems (ROS):   [x]Performed Review of systems (Integumentary, CardioPulmonary, Neurological) by intake and observation. Intake form has been scanned into medical record. Patient has been instructed to contact their primary care physician regarding ROS issues if not already being addressed at this time.   9/8      RESTRICTIONS/PRECAUTIONS: None    Exercises/Interventions:     Exercise/Equipment Resistance/Repetitions Other comments   ROM     ABC'S     BAPS     Bottle Roll     Inversion/Eversion     Ankle Pumps 30x    Toe Curls     Rocks     Towels     Stretching        Toe Extension      Towel Pull 1 30 sec x 5 Gastroc    Towel Pull 2 30 sec x 5 Soleus    ERMI     Incline Stretch 30 sec x 3 Start 11/6   Pro-Stretch     Hamstring 30 sec x 5 Start 9/11   Stair Stretch     Calf 1 (standing gastroc)    Calf 2 (standing soleus)         Isometrics     Dorsiflexion Start 9/11   Plantarflexion Start 9/11   Inversion Start 9/11   Eversion Start 9/11        PRE's     Heel walk     Toe walk     SLR     Calf Raises on leg press 3 x 10; 80# ^11/6   Step Up     Knee Extension 3 x 10; 45# Start 11/9   Hamstring Curls 3 x 10; 45# Start 11/9   Leg Pres 3 x 10; 120# ^11/6   Seated SL heel raises 3 x 10 ^10/13   Bike 5 min Start 10/16   Balance:     SLB 3x30\" R    Rocker Board each 30 sec x 3 Start 9/29   BOSU     SLS     Aeromat     Foam Roll     Plyoback 3 x 10 Start 11/2   Tandem Stance     Biodex PS with targets; 3 min Start 11/6   Bike     Treadmill (Alter G) 10 min Start 10/19 Manual interventions     MFR posterior R calf and achilles tendon, FLH, post tib, plantar fascia 15 min Start  9/8       Therapeutic Exercise and NMR EXR  [x] (91458) Provided verbal/tactile cueing for activities related to strengthening, flexibility, endurance, ROM for improvements in LE, proximal hip, and core control with self care, mobility, lifting, ambulation. [x] (07433) Provided verbal/tactile cueing for activities related to improving balance, coordination, kinesthetic sense, posture, motor skill, proprioception  to assist with LE, proximal hip, and core control in self care, mobility, lifting, ambulation and eccentric single leg control. NMR and Therapeutic Activities:    [x] (83294 or 63729) Provided verbal/tactile cueing for activities related to improving balance, coordination, kinesthetic sense, posture, motor skill, proprioception and motor activation to allow for proper function of core, proximal hip and LE with self care and ADLs  [] (56941) Gait Re-education- Provided training and instruction to the patient for proper LE, core and proximal hip recruitment and positioning and eccentric body weight control with ambulation re-education including up and down stairs     Home Exercise Program:    [x] (90944) Reviewed/Progressed HEP activities related to strengthening, flexibility, endurance, ROM of core, proximal hip and LE for functional self-care, mobility, lifting and ambulation/stair navigation   [] (24610)Reviewed/Progressed HEP activities related to improving balance, coordination, kinesthetic sense, posture, motor skill, proprioception of core, proximal hip and LE for self care, mobility, lifting, and ambulation/stair navigation     Access Code: [email or web address]   URL: [email or web address]   Date: 09/08/2020   Prepared by: Dulce Lima   Manual Treatments:  PROM / STM / Oscillations-Mobs:  G-I, II, III, IV (PA's, Inf., Post.)  [x] (29593) Provided manual therapy to mobilize LE, proximal hip and/or LS spine soft tissue/joints for the purpose of modulating pain, promoting relaxation,  increasing ROM, reducing/eliminating soft tissue swelling/inflammation/restriction, improving soft tissue extensibility and allowing for proper ROM for normal function with self care, mobility, lifting and ambulation. Modalities:  Cont US 1.4W/cm2, 3MHz at R Achilles 12 min  Charges:  Timed Code Treatment Minutes: 53   Total Treatment Minutes: 66  10:48-11:54     [] EVAL (LOW) 63627 (typically 20 minutes face-to-face)  [] EVAL (MOD) 15676 (typically 30 minutes face-to-face)  [] EVAL (HIGH) 75624 (typically 45 minutes face-to-face)  [] RE-EVAL     [x] IZ(28292) x 1     [] IONTO  [x] NMR (11896) x 1    [] VASO  [x] Manual (68671) x 1     [] Other: US 12 min    [x] TA x 1     [] Mech Traction (43706)  [] ES(attended) (18165)      [] ES (un) (11018):     GOALS:   Patient stated goal:  Reduce pain; return to walking and running  [] Progressing: [] Met: [] Not Met: [] Adjusted    Therapist goals for Patient:   Short Term Goals: To be achieved in: 2 weeks  1. Independent in HEP and progression per patient tolerance, in order to prevent re-injury. [] Progressing: [] Met: [] Not Met: [] Adjusted   2. Patient will have a decrease in pain to facilitate improvement in movement, function, and ADLs as indicated by Functional Deficits. [] Progressing: [] Met: [] Not Met: [] Adjusted    Long Term Goals: To be achieved in: 4 weeks  1. Disability index score of 20% or less for the LEFS to assist with reaching prior level of function. [] Progressing: [] Met: [] Not Met: [] Adjusted  2. Patient will demonstrate increased AROM to WNL to allow for proper joint functioning as indicated by patients Functional Deficits. [] Progressing: [] Met: [] Not Met: [] Adjusted  3.  Patient will demonstrate an increase in Strength to good proximal hip strength and control in LE to allow for proper functional mobility as indicated by

## 2020-11-23 ENCOUNTER — HOSPITAL ENCOUNTER (OUTPATIENT)
Dept: PHYSICAL THERAPY | Age: 25
Setting detail: THERAPIES SERIES
Discharge: HOME | End: 2020-11-23
Payer: COMMERCIAL

## 2020-11-23 PROCEDURE — 97140 MANUAL THERAPY 1/> REGIONS: CPT

## 2020-11-23 PROCEDURE — 97112 NEUROMUSCULAR REEDUCATION: CPT

## 2020-11-23 PROCEDURE — 97110 THERAPEUTIC EXERCISES: CPT

## 2020-11-23 NOTE — FLOWSHEET NOTE
The [address] [facility] and [facility] Rehabilitation, [place]  [address], [place], [place]      Physical Therapy Daily Treatment Note  Date:  2020    Patient Name:  Larry Epstein    :  1995  MRN: [id number]  Restrictions/Precautions:    Medical/Treatment Diagnosis Information:  · Diagnosis: M76.60 (ICD-10-CM) - Achilles tendon pain  · Treatment Diagnosis: M25.571 Pain in Right Ankle  Insurance/Certification information:  PT Insurance Information: [place]  Physician Information:  Referring Practitioner: Bettie Bhatti MD  Has the plan of care been signed (Y/N):        []  Yes  [x]  No     Date of Patient follow up with Physician: prn with PCP      Is this a Progress Report:     []  Yes  [x]  No        If Yes:  Date Range for reporting period:  Beginning  Ending    Progress report will be due (10 Rx or 30 days whichever is less):        Recertification will be due (POC Duration  / 90 days whichever is less):          Visit # Insurance Allowable Auth Required    60 []  Yes [x]  No        Functional Scale: LEFS: 47.5% deficit   Date assessed: 20       Latex Allergy:  [x]NO      []YES  Preferred Language for Healthcare:   [x]English       []other:      Pain level:  0/10  Soreness present      SUBJECTIVE:   Patient states his achilles is good today. He states that it was sore yesterday but is better today.        OBJECTIVE:   ROM   PROM AROM Comments    Left Right Left Right    Dorsiflexion   1 pre-tx, 3 post-tx Mild pain   Plantarflexion   62    Inversion   38    Eversion   12                      Strength  9/8 Left Right Comments   Dorsiflexion 5 5    Plantarflexion 5 4+    Inversion 5 5    Eversion 5 4+        Reflexes/Sensation:    [x]Dermatomes/Myotomes intact    [x]Reflexes equal and normal bilaterally   []Other:    Joint mobility:    [x]Normal    []Hypo   []Hyper    Palpation: TTP along R achilles tendon, FHL amd post tib in talar tunnel and min Start 10/19        Manual interventions     MFR posterior R calf and achilles tendon, FLH, post tib, plantar fascia 15 min Start  9/8       Therapeutic Exercise and NMR EXR  [x] (35287) Provided verbal/tactile cueing for activities related to strengthening, flexibility, endurance, ROM for improvements in LE, proximal hip, and core control with self care, mobility, lifting, ambulation. [x] (93722) Provided verbal/tactile cueing for activities related to improving balance, coordination, kinesthetic sense, posture, motor skill, proprioception  to assist with LE, proximal hip, and core control in self care, mobility, lifting, ambulation and eccentric single leg control. NMR and Therapeutic Activities:    [x] (66989 or 06614) Provided verbal/tactile cueing for activities related to improving balance, coordination, kinesthetic sense, posture, motor skill, proprioception and motor activation to allow for proper function of core, proximal hip and LE with self care and ADLs  [] (50930) Gait Re-education- Provided training and instruction to the patient for proper LE, core and proximal hip recruitment and positioning and eccentric body weight control with ambulation re-education including up and down stairs     Home Exercise Program:    [x] (26292) Reviewed/Progressed HEP activities related to strengthening, flexibility, endurance, ROM of core, proximal hip and LE for functional self-care, mobility, lifting and ambulation/stair navigation   [] (22981)Reviewed/Progressed HEP activities related to improving balance, coordination, kinesthetic sense, posture, motor skill, proprioception of core, proximal hip and LE for self care, mobility, lifting, and ambulation/stair navigation     Access Code: [email or web address]   URL: [email or web address]   Date: 09/08/2020   Prepared by: Nat Guerrero   Manual Treatments:  PROM / STM / Oscillations-Mobs:  G-I, II, III, IV (PA's, Inf., Post.)  [x] (88464) Provided manual therapy to mobilize LE, proximal hip and/or LS spine soft tissue/joints for the purpose of modulating pain, promoting relaxation,  increasing ROM, reducing/eliminating soft tissue swelling/inflammation/restriction, improving soft tissue extensibility and allowing for proper ROM for normal function with self care, mobility, lifting and ambulation. Modalities:  Cont US 1.4W/cm2, 3MHz at R Achilles 12 min  Charges:  Timed Code Treatment Minutes: 40   Total Treatment Minutes: 44  10:50-11:34     [] EVAL (LOW) 86914 (typically 20 minutes face-to-face)  [] EVAL (MOD) 92933 (typically 30 minutes face-to-face)  [] EVAL (HIGH) 78879 (typically 45 minutes face-to-face)  [] RE-EVAL     [x] CH(10114) x 1     [] IONTO  [x] NMR (93320) x 1    [] VASO  [x] Manual (55775) x 1     [] Other: US 12 min    [] TA x     [] Mech Traction (87796)  [] ES(attended) (94191)      [] ES (un) (57240):     GOALS:   Patient stated goal:  Reduce pain; return to walking and running  [] Progressing: [] Met: [] Not Met: [] Adjusted    Therapist goals for Patient:   Short Term Goals: To be achieved in: 2 weeks  1. Independent in HEP and progression per patient tolerance, in order to prevent re-injury. [] Progressing: [] Met: [] Not Met: [] Adjusted   2. Patient will have a decrease in pain to facilitate improvement in movement, function, and ADLs as indicated by Functional Deficits. [] Progressing: [] Met: [] Not Met: [] Adjusted    Long Term Goals: To be achieved in: 4 weeks  1. Disability index score of 20% or less for the LEFS to assist with reaching prior level of function. [] Progressing: [] Met: [] Not Met: [] Adjusted  2. Patient will demonstrate increased AROM to WNL to allow for proper joint functioning as indicated by patients Functional Deficits. [] Progressing: [] Met: [] Not Met: [] Adjusted  3.  Patient will demonstrate an increase in Strength to good proximal hip strength and control in LE to allow for proper functional mobility as

## 2020-11-27 ENCOUNTER — HOSPITAL ENCOUNTER (OUTPATIENT)
Dept: PHYSICAL THERAPY | Age: 25
Setting detail: THERAPIES SERIES
Discharge: HOME | End: 2020-11-27
Payer: COMMERCIAL

## 2020-11-27 PROCEDURE — 97112 NEUROMUSCULAR REEDUCATION: CPT

## 2020-11-27 PROCEDURE — 97140 MANUAL THERAPY 1/> REGIONS: CPT

## 2020-11-27 PROCEDURE — 97530 THERAPEUTIC ACTIVITIES: CPT

## 2020-11-27 PROCEDURE — 97110 THERAPEUTIC EXERCISES: CPT

## 2020-11-27 NOTE — FLOWSHEET NOTE
Mobility/Transfers: Independent with ADL's and self care; unable to walk and run; unable to work out; pain and difficulty with stairs and squatting   9/8    Posture: WNL  9/8    Gait: (include devices/WB status) very mild antalgic gait present with walking boot w dec WB time on R  9/25                       [x] Patient history, allergies, meds reviewed. Medical chart reviewed. See intake form. 9/8    Review Of Systems (ROS):   [x]Performed Review of systems (Integumentary, CardioPulmonary, Neurological) by intake and observation. Intake form has been scanned into medical record. Patient has been instructed to contact their primary care physician regarding ROS issues if not already being addressed at this time.   9/8      RESTRICTIONS/PRECAUTIONS: None    Exercises/Interventions:     Exercise/Equipment Resistance/Repetitions Other comments   ROM     ABC'S     BAPS     Bottle Roll     Inversion/Eversion     Ankle Pumps 30x    Toe Curls     Rocks     Towels     Stretching        Toe Extension      Towel Pull 1 30 sec x 5 Gastroc    Towel Pull 2 30 sec x 5 Soleus    ERMI     Incline Stretch 30 sec x 3 Start 11/6   Pro-Stretch     Hamstring 30 sec x 5 Start 9/11   Stair Stretch     Calf 1 (standing gastroc)    Calf 2 (standing soleus)         Isometrics     Dorsiflexion Start 9/11   Plantarflexion Start 9/11   Inversion Start 9/11   Eversion Start 9/11        PRE's     Heel walk     Toe walk     SLR     Calf Raises on leg press 3 x 10; 80# ^11/6   Step Up     Knee Extension 3 x 10; 45# Start 11/9   Hamstring Curls 3 x 10; 45# Start 11/9   Leg Pres 3 x 10; 120# ^11/6   Seated SL heel raises 3 x 10 ^10/13   Bike 5 min Start 10/16   Balance:     SLB 3x30\" R    Rocker Board each 30 sec x 3 Start 9/29   BOSU     SLS     Aeromat     Foam Roll     Plyoback 3 x 10 Start 11/2   Tandem Stance     Biodex PS with targets; 3 min Start 11/6   Bike     Treadmill (Alter G) 10 min Start 10/19        Manual interventions     MFR posterior R calf and achilles tendon, FLH, post tib, plantar fascia 15 min Start  9/8       Therapeutic Exercise and NMR EXR  [x] (51184) Provided verbal/tactile cueing for activities related to strengthening, flexibility, endurance, ROM for improvements in LE, proximal hip, and core control with self care, mobility, lifting, ambulation. [x] (07764) Provided verbal/tactile cueing for activities related to improving balance, coordination, kinesthetic sense, posture, motor skill, proprioception  to assist with LE, proximal hip, and core control in self care, mobility, lifting, ambulation and eccentric single leg control. NMR and Therapeutic Activities:    [x] (26330 or 33923) Provided verbal/tactile cueing for activities related to improving balance, coordination, kinesthetic sense, posture, motor skill, proprioception and motor activation to allow for proper function of core, proximal hip and LE with self care and ADLs  [] (86090) Gait Re-education- Provided training and instruction to the patient for proper LE, core and proximal hip recruitment and positioning and eccentric body weight control with ambulation re-education including up and down stairs     Home Exercise Program:    [x] (89631) Reviewed/Progressed HEP activities related to strengthening, flexibility, endurance, ROM of core, proximal hip and LE for functional self-care, mobility, lifting and ambulation/stair navigation   [] (76170)Reviewed/Progressed HEP activities related to improving balance, coordination, kinesthetic sense, posture, motor skill, proprioception of core, proximal hip and LE for self care, mobility, lifting, and ambulation/stair navigation     Access Code: [email or web address]   URL: [email or web address]   Date: 09/08/2020   Prepared by: Henry Weldon   Manual Treatments:  PROM / STM / Oscillations-Mobs:  G-I, II, III, IV (PA's, Inf., Post.)  [x] (52816) Provided manual therapy to mobilize LE, proximal hip and/or LS spine soft tissue/joints for the purpose of modulating pain, promoting relaxation,  increasing ROM, reducing/eliminating soft tissue swelling/inflammation/restriction, improving soft tissue extensibility and allowing for proper ROM for normal function with self care, mobility, lifting and ambulation. Modalities:  Cont US 1.4W/cm2, 3MHz at R Achilles 12 min  Charges:  Timed Code Treatment Minutes: 55   Total Treatment Minutes: 60  10:50-11:50     [] EVAL (LOW) 99547 (typically 20 minutes face-to-face)  [] EVAL (MOD) 29779 (typically 30 minutes face-to-face)  [] EVAL (HIGH) 90968 (typically 45 minutes face-to-face)  [] RE-EVAL     [x] TA(73707) x 1     [] IONTO  [x] NMR (68297) x 1    [] VASO  [x] Manual (35348) x 1     [] Other: US 12 min    [x] TA x 1     [] Mech Traction (06710)  [] ES(attended) (62149)      [] ES (un) (91305):     GOALS:   Patient stated goal:  Reduce pain; return to walking and running  [] Progressing: [] Met: [] Not Met: [] Adjusted    Therapist goals for Patient:   Short Term Goals: To be achieved in: 2 weeks  1. Independent in HEP and progression per patient tolerance, in order to prevent re-injury. [] Progressing: [] Met: [] Not Met: [] Adjusted   2. Patient will have a decrease in pain to facilitate improvement in movement, function, and ADLs as indicated by Functional Deficits. [] Progressing: [] Met: [] Not Met: [] Adjusted    Long Term Goals: To be achieved in: 4 weeks  1. Disability index score of 20% or less for the LEFS to assist with reaching prior level of function. [] Progressing: [] Met: [] Not Met: [] Adjusted  2. Patient will demonstrate increased AROM to WNL to allow for proper joint functioning as indicated by patients Functional Deficits. [] Progressing: [] Met: [] Not Met: [] Adjusted  3. Patient will demonstrate an increase in Strength to good proximal hip strength and control in LE to allow for proper functional mobility as indicated by patients Functional Deficits.    [] Progressing: []

## 2020-12-07 ENCOUNTER — HOSPITAL ENCOUNTER (OUTPATIENT)
Dept: PHYSICAL THERAPY | Age: 25
Setting detail: THERAPIES SERIES
Discharge: HOME | End: 2020-12-07
Payer: COMMERCIAL

## 2020-12-07 PROCEDURE — 97530 THERAPEUTIC ACTIVITIES: CPT

## 2020-12-07 PROCEDURE — 97140 MANUAL THERAPY 1/> REGIONS: CPT

## 2020-12-07 PROCEDURE — 97110 THERAPEUTIC EXERCISES: CPT

## 2020-12-07 NOTE — FLOWSHEET NOTE
[facility] [address] [place] [address] [facility] [address] [place], [place]      Physical Therapy Daily Treatment Note  Date:  2020    Patient Name:  Dorinda Hagan    :  1995  MRN: [id number]  Restrictions/Precautions:    Medical/Treatment Diagnosis Information:  · Diagnosis: M76.60 (ICD-10-CM) - Achilles tendon pain  · Treatment Diagnosis: M25.571 Pain in Right Ankle  Insurance/Certification information:  PT Insurance Information: [employer]  Physician Information:  Referring Practitioner: Agustina Luque MD  Has the plan of care been signed (Y/N):        []  Yes  [x]  No     Date of Patient follow up with Physician: prn with PCP      Is this a Progress Report:     []  Yes  [x]  No        If Yes:  Date Range for reporting period:  Beginning  Ending    Progress report will be due (10 Rx or 30 days whichever is less):        Recertification will be due (POC Duration  / 90 days whichever is less):          Visit # Insurance Allowable Auth Required   23   60 []  Yes [x]  No        Functional Scale: LEFS: 47.5% deficit   Date assessed: 20       Latex Allergy:  [x]NO      []YES  Preferred Language for Healthcare:   [x]English       []other:      Pain level:  0/10  Soreness present       SUBJECTIVE:   Patient states that he is doing better.         OBJECTIVE:   ROM   PROM AROM Comments    Left Right Left Right    Dorsiflexion   1 pre-tx, 3 post-tx Mild pain   Plantarflexion   62    Inversion   38    Eversion   12                      Strength   Left Right Comments   Dorsiflexion 5 5    Plantarflexion 5 4+    Inversion 5 5    Eversion 5 4+        Reflexes/Sensation:    [x]Dermatomes/Myotomes intact    [x]Reflexes equal and normal bilaterally   []Other:    Joint mobility:    [x]Normal    []Hypo   []Hyper    Palpation: TTP along R achilles tendon, FHL amd post tib in talar tunnel and into plantar fascia; mild edema present      Functional Mobility/Transfers: Independent with ADL's and self care; unable to walk and run; unable to work out; pain and difficulty with stairs and squatting   9/8    Posture: WNL  9/8    Gait: (include devices/WB status) very mild antalgic gait present with walking boot w dec WB time on R  9/25                       [x] Patient history, allergies, meds reviewed. Medical chart reviewed. See intake form. 9/8    Review Of Systems (ROS):   [x]Performed Review of systems (Integumentary, CardioPulmonary, Neurological) by intake and observation. Intake form has been scanned into medical record. Patient has been instructed to contact their primary care physician regarding ROS issues if not already being addressed at this time.   9/8      RESTRICTIONS/PRECAUTIONS: None    Exercises/Interventions:     Exercise/Equipment Resistance/Repetitions Other comments   ROM     ABC'S     BAPS     Bottle Roll     Inversion/Eversion     Ankle Pumps 30x    Toe Curls     Rocks     Towels     Stretching        Toe Extension      Towel Pull 1 30 sec x 5 Gastroc    Towel Pull 2 30 sec x 5 Soleus    ERMI     Incline Stretch 30 sec x 3 Start 11/6   Pro-Stretch     Hamstring 30 sec x 5 Start 9/11   Stair Stretch     Calf 1 (standing gastroc)    Calf 2 (standing soleus)         Isometrics     Dorsiflexion Start 9/11   Plantarflexion Start 9/11   Inversion Start 9/11   Eversion Start 9/11        PRE's     Heel walk     Toe walk     SLR     Calf Raises on leg press 3 x 10; 80# ^11/6   Step Up     Knee Extension 3 x 10; 45# Start 11/9   Hamstring Curls 3 x 10; 45# Start 11/9   Leg Pres 3 x 10; 120# ^11/6   Seated SL heel raises 3 x 10 ^10/13   Bike 5 min Start 10/16   Balance:     SLB 3x30\" R    Rocker Board each 30 sec x 3 Start 9/29   BOSU     SLS     Aeromat     Foam Roll     Plyoback 3 x 10 Start 11/2   Tandem Stance     Biodex PS with targets; 3 min Start 11/6   Bike     Treadmill (Alter G) 10 min Start 10/19        Manual interventions     MFR posterior the purpose of modulating pain, promoting relaxation,  increasing ROM, reducing/eliminating soft tissue swelling/inflammation/restriction, improving soft tissue extensibility and allowing for proper ROM for normal function with self care, mobility, lifting and ambulation. Modalities:    Charges:  Timed Code Treatment Minutes: 45   Total Treatment Minutes: 20  10:50-11:50     [] EVAL (LOW) 83851 (typically 20 minutes face-to-face)  [] EVAL (MOD) 54347 (typically 30 minutes face-to-face)  [] EVAL (HIGH) 13588 (typically 45 minutes face-to-face)  [] RE-EVAL     [x] BB(46933) x 1     [] IONTO  [] NMR (53488) x     [] VASO  [x] Manual (39518) x 1     [] Other: US 12 min    [x] TA x 1     [] Mech Traction (25597)  [] ES(attended) (17433)      [] ES (un) (89422):     GOALS:   Patient stated goal:  Reduce pain; return to walking and running  [] Progressing: [] Met: [] Not Met: [] Adjusted    Therapist goals for Patient:   Short Term Goals: To be achieved in: 2 weeks  1. Independent in HEP and progression per patient tolerance, in order to prevent re-injury. [] Progressing: [] Met: [] Not Met: [] Adjusted   2. Patient will have a decrease in pain to facilitate improvement in movement, function, and ADLs as indicated by Functional Deficits. [] Progressing: [] Met: [] Not Met: [] Adjusted    Long Term Goals: To be achieved in: 4 weeks  1. Disability index score of 20% or less for the LEFS to assist with reaching prior level of function. [] Progressing: [] Met: [] Not Met: [] Adjusted  2. Patient will demonstrate increased AROM to WNL to allow for proper joint functioning as indicated by patients Functional Deficits. [] Progressing: [] Met: [] Not Met: [] Adjusted  3. Patient will demonstrate an increase in Strength to good proximal hip strength and control in LE to allow for proper functional mobility as indicated by patients Functional Deficits. [] Progressing: [] Met: [] Not Met: [] Adjusted  4.  Patient will return to Independent functional activities without increased symptoms or restriction. [] Progressing: [] Met: [] Not Met: [] Adjusted  5. Patient will report returning to running for 1 mile or longer with no increase in pain. [] Progressing: [] Met: [] Not Met: [] Adjusted     Overall Progression Towards Functional goals/ Treatment Progress Update:  [] Patient is progressing as expected towards functional goals listed. [] Progression is slowed due to complexities/Impairments listed. [] Progression has been slowed due to co-morbidities. [x] Plan just implemented, too soon to assess goals progression <30days   [] Goals require adjustment due to lack of progress  [] Patient is not progressing as expected and requires additional follow up with physician  [] Other    Prognosis for POC: [x] Good [] Fair  [] Poor      Patient requires continued skilled intervention: [x] Yes  [] No     Treatment/Activity Tolerance:  [x] Patient able to complete treatment  [] Patient limited by fatigue  [] Patient limited by pain     [] Patient limited by other medical complications  [x] Other: 12/7 Pt making progress. Good tolerance to program today. Fatigued at end of session. Good tolerance to manual this session. Able to jog for short period of time in [employer] with no reports of pain. Continue to progress as tolerated. Patient Education:  Icing to control pain and swelling. Use of tennis ball for self massage in calf and foot at home. PLAN: See eval  [x] Continue per plan of care [] Alter current plan (see comments above)  [] Plan of care initiated [] Hold pending MD visit [] Discharge      Electronically signed by:  Gamaliel Perez, PT, DPT    Note: If patient does not return for scheduled/ recommended follow up visits, this note will serve as a discharge from care along with most recent update on progress.

## 2020-12-09 ENCOUNTER — OFFICE VISIT (OUTPATIENT)
Dept: RHEUMATOLOGY | Age: 25
End: 2020-12-09
Payer: COMMERCIAL

## 2020-12-09 VITALS — WEIGHT: 156 LBS | TEMPERATURE: 97.5 F | BODY MASS INDEX: 25.07 KG/M2 | HEIGHT: 66 IN

## 2020-12-09 LAB
A/G RATIO: 3.5 (ref 1.1–2.2)
ALBUMIN SERPL-MCNC: 4.9 G/DL (ref 3.4–5)
ALP BLD-CCNC: 78 U/L (ref 40–129)
ALT SERPL-CCNC: 30 U/L (ref 10–40)
ANION GAP SERPL CALCULATED.3IONS-SCNC: 13 MMOL/L (ref 3–16)
AST SERPL-CCNC: 22 U/L (ref 15–37)
BASOPHILS ABSOLUTE: 0 K/UL (ref 0–0.2)
BASOPHILS RELATIVE PERCENT: 0.8 %
BILIRUB SERPL-MCNC: 0.4 MG/DL (ref 0–1)
BUN BLDV-MCNC: 25 MG/DL (ref 7–20)
C-REACTIVE PROTEIN: <0.3 MG/L (ref 0–5.1)
CALCIUM SERPL-MCNC: 9.1 MG/DL (ref 8.3–10.6)
CHLORIDE BLD-SCNC: 103 MMOL/L (ref 99–110)
CO2: 24 MMOL/L (ref 21–32)
CREAT SERPL-MCNC: 0.9 MG/DL (ref 0.9–1.3)
EOSINOPHILS ABSOLUTE: 0 K/UL (ref 0–0.6)
EOSINOPHILS RELATIVE PERCENT: 0.1 %
GFR AFRICAN AMERICAN: >60
GFR NON-AFRICAN AMERICAN: >60
GLOBULIN: 1.4 G/DL
GLUCOSE BLD-MCNC: 90 MG/DL (ref 70–99)
HCT VFR BLD CALC: 43.2 % (ref 40.5–52.5)
HEMOGLOBIN: 14.2 G/DL (ref 13.5–17.5)
LYMPHOCYTES ABSOLUTE: 1.4 K/UL (ref 1–5.1)
LYMPHOCYTES RELATIVE PERCENT: 27.1 %
MCH RBC QN AUTO: 29.4 PG (ref 26–34)
MCHC RBC AUTO-ENTMCNC: 32.9 G/DL (ref 31–36)
MCV RBC AUTO: 89.5 FL (ref 80–100)
MONOCYTES ABSOLUTE: 0.7 K/UL (ref 0–1.3)
MONOCYTES RELATIVE PERCENT: 13 %
NEUTROPHILS ABSOLUTE: 3.1 K/UL (ref 1.7–7.7)
NEUTROPHILS RELATIVE PERCENT: 59 %
PDW BLD-RTO: 13.7 % (ref 12.4–15.4)
PLATELET # BLD: 228 K/UL (ref 135–450)
PMV BLD AUTO: 8.4 FL (ref 5–10.5)
POTASSIUM SERPL-SCNC: 4.9 MMOL/L (ref 3.5–5.1)
RBC # BLD: 4.82 M/UL (ref 4.2–5.9)
SEDIMENTATION RATE, ERYTHROCYTE: 1 MM/HR (ref 0–15)
SODIUM BLD-SCNC: 140 MMOL/L (ref 136–145)
TOTAL PROTEIN: 6.3 G/DL (ref 6.4–8.2)
WBC # BLD: 5.3 K/UL (ref 4–11)

## 2020-12-09 PROCEDURE — 99214 OFFICE O/P EST MOD 30 MIN: CPT | Performed by: INTERNAL MEDICINE

## 2020-12-09 RX ORDER — SULFASALAZINE 500 MG/1
TABLET ORAL
Qty: 360 TABLET | Refills: 0 | Status: SHIPPED | OUTPATIENT
Start: 2020-12-09 | End: 2022-03-08

## 2020-12-09 NOTE — PROGRESS NOTES
[address], [place]                                                           [address] [place] [address], Sindy [address] (P) [phone] (F)      Dear Dr. Loc Paiz MD:  Please find Rheumatology assessment. Thank you for giving me the opportunity to be involved in [address] [place] [address] care and I look forward following John Yuen along with you. If you have any questions or concerns please feel free to reach me. Note is transcribed using voice recognition software. Inadvertent computerized transcription errors may be present. Patient identification: Gerber Ayon: 1995,25 y.o. Sex: male     A/P  John Yuen was seen today for follow-up. Diagnoses and all orders for this visit:    Inflammatory arthritis    High risk medication use  -     CBC Auto Differential  -     C-Reactive Protein  -     Sedimentation Rate  -     Comprehensive Metabolic Panel    Other orders  -     sulfaSALAzine (AZULFIDINE) 500 MG tablet; Take 2 tab po BID      Inflammatory arthritis-doing much better about 70 to 80% improvement on symptoms on sulfasalazine 1 g twice daily. Still has some discomfort in his finger joints in the morning. Started sulfasalazine 6 weeks ago. Tolerating medications well. Plan-  Check labs to monitor medications as well as disease activity. Continue current dose of sulfasalazine at 1 g twice daily as this still time for sulfasalazine to get to full therapeutic effect. He was advised to call me with any symptoms or concerns otherwise follow-up in 3 months. Patient indicates understanding and agrees with the management plan.   I reviewed patient's history, referral documents and electronic medical records. #######################################################################    Follow-up for inflammatory polyarthritis-negative RA serologies PHAM HLA-B27 and hepatitis serologies  Interval changes-he started sulfasalazine 6 weeks ago, has been very well. Tells me that he is off the boot, has much less stiffness in his fingers although does feel some achiness when he wakes up in the morning the last for 15 to 20 minutes. Sometimes he notices swelling in his finger joints otherwise doing well. Overall ADLs and recreational activities are much more manageable. Tolerating medications well. Denies any persistent GI symptoms, rashes or any intercurrent infections. Rest of review of systems are negative. Past Medical History:   Diagnosis Date    ADHD     Anxiety     Depression      Past Surgical History:   Procedure Laterality Date    SHOULDER SURGERY Right 12/2014     Social History     Socioeconomic History    Marital status: Single     Spouse name: Not on file    Number of children: Not on file    Years of education: Not on file    Highest education level: Not on file   Occupational History    Occupation: [employer]    Social Needs    Financial resource strain: Not hard at all   Drobo insecurity     Worry: Never true     Inability: Never true   Ludei needs     Medical: No     Non-medical: No   Tobacco Use    Smoking status: Never Smoker    Smokeless tobacco: Never Used   Substance and Sexual Activity    Alcohol use:  Yes     Alcohol/week: 4.0 standard drinks     Types: 4 Cans of beer per week     Comment: Social    Drug use: Not on file    Sexual activity: Not on file   Lifestyle    Physical activity     Days per week: Not on file     Minutes per session: Not on file    Stress: Not on file   Relationships    Social connections     Talks on phone: Not on file     Gets together: Not on file     Attends Baptism service: Not on file     Active member of club or organization: Not on file     Attends meetings of clubs or organizations: Not on file     Relationship status: Not on file    Intimate partner violence     Fear of current or ex partner: Not on file     Emotionally abused: Not on file     Physically abused: Not on file     Forced sexual activity: Not on file   Other Topics Concern    Not on file   Social History Narrative    Not on file       No family history of autoimmune diseases    Current Outpatient Medications   Medication Sig Dispense Refill    sulfaSALAzine (AZULFIDINE) 500 MG tablet Take 2 tab po  tablet 0    diclofenac (VOLTAREN) 75 MG EC tablet TAKE 1 TABLET BY MOUTH TWICE DAILY 60 tablet 3    sulfaSALAzine (AZULFIDINE) 500 MG tablet Take 1 tab twice a day for week 1. Increase 2 tab twice a day thereafter as tolerated. 120 tablet 1    cyclobenzaprine (FLEXERIL) 5 MG tablet TAKE 1 TABLET BY MOUTH THREE TIMES DAILY AS NEEDED FOR MUSCLE SPASMS 90 tablet 1    Lisdexamfetamine Dimesylate (VYVANSE) 60 MG CAPS Take 1 capsule by mouth daily . No current facility-administered medications for this visit. No Known Allergies    PHYSICAL EXAM:    Vitals:    Temp 97.5 °F (36.4 °C) (Skin)   Ht 5' 6\" (1.676 m)   Wt 156 lb (70.8 kg)   BMI 25.18 kg/m²   General appearance/ Psychiatric: well nourished, and well groomed, normal judgement, alert, appears stated age and cooperative. MKS: Other than mild arthralgias across MCPs, PIPs-no objective finding appreciated in physical examination in his upper or lower extremity joints, full range of motion all peripheral joints. Skin: Mild erythema on the dorsum of DIPs, otherwise I do not see any skin changes. HEENT: Normal lids, lacrimal glands and pupils. No oral or nasal ulcers. Salivary glands reveal no evidence of abnormality. External inspection of the ears and nose within normal limits.       DATA:   Lab Results   Component Value Date    WBC 9.4 10/05/2020    HGB 14.0 10/05/2020    HCT 40.0 10/05/2020    MCV 86.7 10/05/2020     10/05/2020         Chemistry        Component Value Date/Time     10/05/2020 0545    K 4.0 10/05/2020 0545     10/05/2020 0545    CO2 27 10/05/2020 0545    BUN 16 10/05/2020 0545    CREATININE 1.02 10/05/2020 0545        Component Value Date/Time    CALCIUM 9.2 10/05/2020 0545    ALKPHOS 77 08/28/2019 1127    AST 22 08/28/2019 1127    ALT 29 08/28/2019 1127    BILITOT 0.3 08/28/2019 1127          No results found for: [facility]  Lab Results   Component Value Date    SEDRATE 2 10/04/2020     No results found for: CRP  Lab Results   Component Value Date    PHAM Negative 10/07/2020    SEDRATE 2 10/04/2020     No results found for: CKTOTAL  Lab Results   Component Value Date    TSH 1.94 08/28/2019        A/P- See above.

## 2020-12-11 ENCOUNTER — HOSPITAL ENCOUNTER (OUTPATIENT)
Dept: PHYSICAL THERAPY | Age: 25
Setting detail: THERAPIES SERIES
Discharge: HOME | End: 2020-12-11
Payer: COMMERCIAL

## 2020-12-11 NOTE — FLOWSHEET NOTE
Physical Therapy  Cancellation/No-show Note  Patient Name:  Twila Green  :  1995   Date:  2020  Cancelled visits to date: 01  No-shows to date: 0    For today's appointment patient:  [x]  Cancelled  []  Rescheduled appointment  []  No-show     Reason given by patient:  [x]  Patient ill  []  Conflicting appointment  []  No transportation    []  Conflict with work  []  No reason given  []  Other:     Comments:      Electronically signed by:  Nat Guerrero PT, DPT

## 2020-12-14 ENCOUNTER — HOSPITAL ENCOUNTER (OUTPATIENT)
Dept: PHYSICAL THERAPY | Age: 25
Setting detail: THERAPIES SERIES
Discharge: HOME | End: 2020-12-14
Payer: COMMERCIAL

## 2020-12-14 PROCEDURE — 97140 MANUAL THERAPY 1/> REGIONS: CPT

## 2020-12-14 PROCEDURE — 97110 THERAPEUTIC EXERCISES: CPT

## 2020-12-14 PROCEDURE — 97530 THERAPEUTIC ACTIVITIES: CPT

## 2020-12-14 NOTE — FLOWSHEET NOTE
MFR posterior R calf and achilles tendon, FLH, post tib, plantar fascia 15 min Start  9/8       Therapeutic Exercise and NMR EXR  [x] (94279) Provided verbal/tactile cueing for activities related to strengthening, flexibility, endurance, ROM for improvements in LE, proximal hip, and core control with self care, mobility, lifting, ambulation. [x] (30018) Provided verbal/tactile cueing for activities related to improving balance, coordination, kinesthetic sense, posture, motor skill, proprioception  to assist with LE, proximal hip, and core control in self care, mobility, lifting, ambulation and eccentric single leg control. NMR and Therapeutic Activities:    [x] (49509 or 65139) Provided verbal/tactile cueing for activities related to improving balance, coordination, kinesthetic sense, posture, motor skill, proprioception and motor activation to allow for proper function of core, proximal hip and LE with self care and ADLs  [] (72639) Gait Re-education- Provided training and instruction to the patient for proper LE, core and proximal hip recruitment and positioning and eccentric body weight control with ambulation re-education including up and down stairs     Home Exercise Program:    [x] (65666) Reviewed/Progressed HEP activities related to strengthening, flexibility, endurance, ROM of core, proximal hip and LE for functional self-care, mobility, lifting and ambulation/stair navigation   [] (33244)Reviewed/Progressed HEP activities related to improving balance, coordination, kinesthetic sense, posture, motor skill, proprioception of core, proximal hip and LE for self care, mobility, lifting, and ambulation/stair navigation     Access Code: [email or web address]   URL: [email or web address]   Date: 09/08/2020   Prepared by: Lorenzo Figueroa   Manual Treatments:  PROM / STM / Oscillations-Mobs:  G-I, II, III, IV (PA's, Inf., Post.) [x] (44339) Provided manual therapy to mobilize LE, proximal hip and/or LS spine soft tissue/joints for the purpose of modulating pain, promoting relaxation,  increasing ROM, reducing/eliminating soft tissue swelling/inflammation/restriction, improving soft tissue extensibility and allowing for proper ROM for normal function with self care, mobility, lifting and ambulation. Modalities:    Charges:  Timed Code Treatment Minutes: 45   Total Treatment Minutes: 61  10:06-11:07     [] EVAL (LOW) 27763 (typically 20 minutes face-to-face)  [] EVAL (MOD) 73348 (typically 30 minutes face-to-face)  [] EVAL (HIGH) 64668 (typically 45 minutes face-to-face)  [] RE-EVAL     [x] SALGUERO(63449) x 1     [] IONTO  [] NMR (11397) x     [] VASO  [x] Manual (36998) x 1     [] Other: US 12 min    [x] TA x 1     [] Mech Traction (89816)  [] ES(attended) (30941)      [] ES (un) (43430):     GOALS:   Patient stated goal:  Reduce pain; return to walking and running  [] Progressing: [] Met: [] Not Met: [] Adjusted    Therapist goals for Patient:   Short Term Goals: To be achieved in: 2 weeks  1. Independent in HEP and progression per patient tolerance, in order to prevent re-injury. [] Progressing: [] Met: [] Not Met: [] Adjusted   2. Patient will have a decrease in pain to facilitate improvement in movement, function, and ADLs as indicated by Functional Deficits. [] Progressing: [] Met: [] Not Met: [] Adjusted    Long Term Goals: To be achieved in: 4 weeks  1. Disability index score of 20% or less for the LEFS to assist with reaching prior level of function. [] Progressing: [] Met: [] Not Met: [] Adjusted  2. Patient will demonstrate increased AROM to WNL to allow for proper joint functioning as indicated by patients Functional Deficits.     [] Progressing: [] Met: [] Not Met: [] Adjusted 3. Patient will demonstrate an increase in Strength to good proximal hip strength and control in LE to allow for proper functional mobility as indicated by patients Functional Deficits. [] Progressing: [] Met: [] Not Met: [] Adjusted  4. Patient will return to Independent functional activities without increased symptoms or restriction. [] Progressing: [] Met: [] Not Met: [] Adjusted  5. Patient will report returning to running for 1 mile or longer with no increase in pain. [] Progressing: [] Met: [] Not Met: [] Adjusted     Overall Progression Towards Functional goals/ Treatment Progress Update:  [] Patient is progressing as expected towards functional goals listed. [] Progression is slowed due to complexities/Impairments listed. [] Progression has been slowed due to co-morbidities. [x] Plan just implemented, too soon to assess goals progression <30days   [] Goals require adjustment due to lack of progress  [] Patient is not progressing as expected and requires additional follow up with physician  [] Other    Prognosis for POC: [x] Good [] Fair  [] Poor      Patient requires continued skilled intervention: [x] Yes  [] No     Treatment/Activity Tolerance:  [x] Patient able to complete treatment  [] Patient limited by fatigue  [] Patient limited by pain     [] Patient limited by other medical complications  [x] Other: 12/14 Pt making progress. Good tolerance to program today. Fatigued at end of session. Good tolerance to manual this session. Continue to progress as tolerated. Patient Education:  Icing to control pain and swelling. Use of tennis ball for self massage in calf and foot at home.                  PLAN: See eval  [x] Continue per plan of care [] Alter current plan (see comments above)  [] Plan of care initiated [] Hold pending MD visit [] Discharge      Electronically signed by:  Ayo Dodson, PT, DPT Note: If patient does not return for scheduled/ recommended follow up visits, this note will serve as a discharge from care along with most recent update on progress.

## 2020-12-18 ENCOUNTER — HOSPITAL ENCOUNTER (OUTPATIENT)
Dept: PHYSICAL THERAPY | Age: 25
Setting detail: THERAPIES SERIES
Discharge: HOME | End: 2020-12-18
Payer: COMMERCIAL

## 2020-12-18 PROCEDURE — 97530 THERAPEUTIC ACTIVITIES: CPT

## 2020-12-18 PROCEDURE — 97140 MANUAL THERAPY 1/> REGIONS: CPT

## 2020-12-18 PROCEDURE — 97110 THERAPEUTIC EXERCISES: CPT

## 2020-12-18 NOTE — FLOWSHEET NOTE
[facility], [address], [place]      Physical Therapy Daily Treatment Note  Date:  2020    Patient Name:  Garcia Joshua    :  1995  MRN: [id number]  Restrictions/Precautions:    Medical/Treatment Diagnosis Information:  · Diagnosis: M76.60 (ICD-10-CM) - Achilles tendon pain  · Treatment Diagnosis: M25.571 Pain in Right Ankle  Insurance/Certification information:  PT Insurance Information: [employer]  Physician Information:  Referring Practitioner: Prisca Cazares MD  Has the plan of care been signed (Y/N):        []  Yes  [x]  No     Date of Patient follow up with Physician: prn with PCP      Is this a Progress Report:     []  Yes  [x]  No        If Yes:  Date Range for reporting period:  Beginning  Ending    Progress report will be due (10 Rx or 30 days whichever is less):        Recertification will be due (POC Duration  / 90 days whichever is less):          Visit # Insurance Allowable Auth Required   25   60 []  Yes [x]  No        Functional Scale: LEFS: 47.5% deficit   Date assessed: 20       Latex Allergy:  [x]NO      []YES  Preferred Language for Healthcare:   [x]English       []other:      Pain level:  0/10  Soreness present       SUBJECTIVE:   Patient states that he is feeling pretty good.  Patient states that he was a little sore after his last session with jogging but doing well.       ? OBJECTIVE:   ROM   PROM AROM Comments    Left Right Left Right    Dorsiflexion   1 pre-tx, 3 post-tx Mild pain   Plantarflexion   62    Inversion   38    Eversion   12                      Strength  / Left Right Comments   Dorsiflexion 5 5    Plantarflexion 5 4+    Inversion 5 5    Eversion 5 4+        Reflexes/Sensation:    [x]Dermatomes/Myotomes intact    [x]Reflexes equal and normal bilaterally0 []Other:    Joint mobility:    [x]Normal    []Hypo   []Hyper Palpation: TTP along R achilles tendon, FHL amd post tib in talar tunnel and into plantar fascia; mild edema present  9/25    Functional Mobility/Transfers: Independent with ADL's and self care; unable to walk and run; unable to work out; pain and difficulty with stairs and squatting   9/8    Posture: WNL  9/8    Gait: (include devices/WB status) very mild antalgic gait present with walking boot w dec WB time on R  9/25                       [x] Patient history, allergies, meds reviewed. Medical chart reviewed. See intake form. 9/8    Review Of Systems (ROS):  ? [x]Performed Review of systems (Integumentary, CardioPulmonary, Neurological) by intake and observation. Intake form has been scanned into medical record. Patient has been instructed to contact their primary care physician regarding ROS issues if not already being addressed at this time.   9/8      RESTRICTIONS/PRECAUTIONS: None    Exercises/Interventions:     Exercise/Equipment Resistance/Repetitions Other comments   ROM     ABC'S     BAPS     Bottle Roll     Inversion/Eversion     Ankle Pumps 30x    Toe Curls     Rocks     Towels     Stretching        Toe Extension      Towel Pull 1 30 sec x 5 Gastroc    Towel Pull 2 30 sec x 5 Soleus    ERMI     Incline Stretch 30 sec x 3 Start 11/6   Pro-Stretch     Hamstring 30 sec x 5 Start 9/11   Stair Stretch     Calf 1 (standing gastroc)    Calf 2 (standing soleus)         Isometrics     Dorsiflexion Start 9/11   Plantarflexion Start 9/11   Inversion Start 9/11   Eversion Start 9/11        PRE's     Heel walk     Toe walk     SLR     Calf Raises on leg press 3 x 10; 80# ^11/6   Step Up     Knee Extension 3 x 10; 45# Start 11/9   Hamstring Curls 3 x 10; 45# Start 11/9   Leg Pres 3 x 10; 120# ^11/6   Seated SL heel raises 3 x 10 ^10/13   Bike 5 min Start 10/16   Balance:     SLB 3x30\" R    Rocker Board each 30 sec x 3 Start 9/29   BOSU     SLS     Aeromat     Foam Roll     Plyoback 3 x 10 Start 11/2   Tandem Stance Manual Treatments:  PROM / STM / Oscillations-Mobs:  G-I, II, III, IV (PA's, Inf., Post.)  [x] (98014) Provided manual therapy to mobilize LE, proximal hip and/or LS spine soft tissue/joints for the purpose of modulating pain, promoting relaxation,  increasing ROM, reducing/eliminating soft tissue swelling/inflammation/restriction, improving soft tissue extensibility and allowing for proper ROM for normal function with self care, mobility, lifting and ambulation. Modalities:    Charges:  Timed Code Treatment Minutes: 45   Total Treatment Minutes: 54  10:06-11:00     [] EVAL (LOW) 03161 (typically 20 minutes face-to-face)  [] EVAL (MOD) 85855 (typically 30 minutes face-to-face)  [] EVAL (HIGH) 93520 (typically 45 minutes face-to-face)  [] RE-EVAL     [x] RM(99991) x 1     [] IONTO  [] NMR (19033) x     [] VASO  [x] Manual (36910) x 1     [] Other: US 12 min    [x] TA x 1     [] Mech Traction (64975)  [] ES(attended) (40340)      [] ES (un) (04005):     GOALS:   Patient stated goal:  Reduce pain; return to walking and running  [] Progressing: [] Met: [] Not Met: [] Adjusted    Therapist goals for Patient:   Short Term Goals: To be achieved in: 2 weeks  1. Independent in HEP and progression per patient tolerance, in order to prevent re-injury. [] Progressing: [] Met: [] Not Met: [] Adjusted   2. Patient will have a decrease in pain to facilitate improvement in movement, function, and ADLs as indicated by Functional Deficits. [] Progressing: [] Met: [] Not Met: [] Adjusted    Long Term Goals: To be achieved in: 4 weeks  1. Disability index score of 20% or less for the LEFS to assist with reaching prior level of function. [] Progressing: [] Met: [] Not Met: [] Adjusted  2. Patient will demonstrate increased AROM to WNL to allow for proper joint functioning as indicated by patients Functional Deficits.     [] Progressing: [] Met: [] Not Met: [] Adjusted 3. Patient will demonstrate an increase in Strength to good proximal hip strength and control in LE to allow for proper functional mobility as indicated by patients Functional Deficits. [] Progressing: [] Met: [] Not Met: [] Adjusted  4. Patient will return to Independent functional activities without increased symptoms or restriction. [] Progressing: [] Met: [] Not Met: [] Adjusted  5. Patient will report returning to running for 1 mile or longer with no increase in pain. [] Progressing: [] Met: [] Not Met: [] Adjusted     Overall Progression Towards Functional goals/ Treatment Progress Update:  [] Patient is progressing as expected towards functional goals listed. [] Progression is slowed due to complexities/Impairments listed. [] Progression has been slowed due to co-morbidities. [x] Plan just implemented, too soon to assess goals progression <30days   [] Goals require adjustment due to lack of progress  [] Patient is not progressing as expected and requires additional follow up with physician  [] Other    Prognosis for POC: [x] Good [] Fair  [] Poor      Patient requires continued skilled intervention: [x] Yes  [] No     Treatment/Activity Tolerance:  [x] Patient able to complete treatment  [] Patient limited by fatigue  [] Patient limited by pain     [] Patient limited by other medical complications  [x] Other: 12/18 Pt making progress. Good tolerance to program today. Fatigued at end of session. Good tolerance to manual this session. Continue to progress as tolerated. Patient Education:  Icing to control pain and swelling. Use of tennis ball for self massage in calf and foot at home.                  PLAN: See eval  [x] Continue per plan of care [] Alter current plan (see comments above)  [] Plan of care initiated [] Hold pending MD visit [] Discharge      Electronically signed by:  Nereyda Orta, PT, DPT

## 2020-12-22 ENCOUNTER — HOSPITAL ENCOUNTER (OUTPATIENT)
Dept: PHYSICAL THERAPY | Age: 25
Setting detail: THERAPIES SERIES
Discharge: HOME | End: 2020-12-22
Payer: COMMERCIAL

## 2020-12-22 PROCEDURE — 97110 THERAPEUTIC EXERCISES: CPT

## 2020-12-22 PROCEDURE — 97530 THERAPEUTIC ACTIVITIES: CPT

## 2020-12-22 PROCEDURE — 97140 MANUAL THERAPY 1/> REGIONS: CPT

## 2020-12-22 NOTE — DISCHARGE SUMMARY
The [address] [place] [address]     Physical Therapy Discharge Summary    Dear  ,    We had the pleasure of treating the following patient for physical therapy services at [address] [place] [address]. A summary of our findings can be found in the discharge summary below. If you have any questions or concerns regarding these findings, please do not hesitate to contact me at the office phone number checked above.   Thank you for the referral.     Physician Signature:________________________________Date:__________________  By signing above (or electronic signature), therapists plan is approved by physician      Overall Response to Treatment:   []Patient is responding well to treatment and improvement is noted with regards  to goals   []Patient should continue to improve in reasonable time if they continue HEP   []Patient has plateaued and is no longer responding to skilled PT intervention    []Patient is getting worse and would benefit from return to referring MD   []Patient unable to adhere to initial POC   [x]Other:     Date range of Visits: 20-20    Total Visits: 26    Physical Therapy Daily Treatment Note  Date:  2020    Patient Name:  Morrell Boast    :  1995  MRN: [id number]  Restrictions/Precautions:    Medical/Treatment Diagnosis Information:  · Diagnosis: M76.60 (ICD-10-CM) - Achilles tendon pain  · Treatment Diagnosis: M25.571 Pain in Right Ankle  Insurance/Certification information:  PT Insurance Information: [employer]  Physician Information:  Referring Practitioner: Nuvia Kothari MD  Has the plan of care been signed (Y/N):        []  Yes  [x]  No     Date of Patient follow up with Physician: prn with PCP      Is this a Progress Report:     []  Yes  [x]  No        If Yes:  Date Range for reporting period:  Beginning  Ending Incline Stretch 30 sec x 3 Start 11/6   Pro-Stretch     Hamstring 30 sec x 5 Start 9/11   Stair Stretch     Calf 1 (standing gastroc)    Calf 2 (standing soleus)         Isometrics     Dorsiflexion Start 9/11   Plantarflexion Start 9/11   Inversion Start 9/11   Eversion Start 9/11        PRE's     Heel walk     Toe walk     SLR     Calf Raises on leg press 3 x 10; 80# ^11/6   Step Up     Knee Extension 3 x 10; 45# Start 11/9   Hamstring Curls 3 x 10; 45# Start 11/9   Leg Pres 3 x 10; 120# ^11/6   Seated SL heel raises 3 x 10 ^10/13   Bike 5 min Start 10/16   Balance:     SLB 3x30\" R    Rocker Board each 30 sec x 3 Start 9/29   BOSU     SLS     Aeromat     Foam Roll     Plyoback 3 x 10 Start 11/2   Tandem Stance     Biodex PS with targets; 3 min Start 11/6   Bike     Treadmill (Alter G) 10 min Start 10/19        Manual interventions     MFR posterior R calf and achilles tendon, FLH, post tib, plantar fascia 15 min Start  9/8       Therapeutic Exercise and NMR EXR  [x] (28350) Provided verbal/tactile cueing for activities related to strengthening, flexibility, endurance, ROM for improvements in LE, proximal hip, and core control with self care, mobility, lifting, ambulation. [x] (88757) Provided verbal/tactile cueing for activities related to improving balance, coordination, kinesthetic sense, posture, motor skill, proprioception  to assist with LE, proximal hip, and core control in self care, mobility, lifting, ambulation and eccentric single leg control.      NMR and Therapeutic Activities:    [x] (70705 or 14717) Provided verbal/tactile cueing for activities related to improving balance, coordination, kinesthetic sense, posture, motor skill, proprioception and motor activation to allow for proper function of core, proximal hip and LE with self care and ADLs [] (94563) Gait Re-education- Provided training and instruction to the patient for proper LE, core and proximal hip recruitment and positioning and eccentric body weight control with ambulation re-education including up and down stairs     Home Exercise Program:    [x] (99154) Reviewed/Progressed HEP activities related to strengthening, flexibility, endurance, ROM of core, proximal hip and LE for functional self-care, mobility, lifting and ambulation/stair navigation   [] (96643)Reviewed/Progressed HEP activities related to improving balance, coordination, kinesthetic sense, posture, motor skill, proprioception of core, proximal hip and LE for self care, mobility, lifting, and ambulation/stair navigation     Access Code: [email or web address]   URL: [email or web address]   Date: 09/08/2020   Prepared by: Mega Robins   Manual Treatments:  PROM / STM / Oscillations-Mobs:  G-I, II, III, IV (PA's, Inf., Post.)  [x] (74264) Provided manual therapy to mobilize LE, proximal hip and/or LS spine soft tissue/joints for the purpose of modulating pain, promoting relaxation,  increasing ROM, reducing/eliminating soft tissue swelling/inflammation/restriction, improving soft tissue extensibility and allowing for proper ROM for normal function with self care, mobility, lifting and ambulation.      Modalities:    Charges:  Timed Code Treatment Minutes: 45   Total Treatment Minutes: 67  10:53-12:00     [] EVAL (LOW) 61826 (typically 20 minutes face-to-face)  [] EVAL (MOD) 04810 (typically 30 minutes face-to-face)  [] EVAL (HIGH) 40628 (typically 45 minutes face-to-face)  [] RE-EVAL     [x] RO(19710) x 1     [] IONTO  [] NMR (67568) x     [] VASO  [x] Manual (95860) x 1     [] Other: US 12 min    [x] TA x 1     [] Mech Traction (58969)  [] ES(attended) (62195)      [] ES (un) (69032):     GOALS:   Patient stated goal:  Reduce pain; return to walking and running  [] Progressing: [] Met: [] Not Met: [] Adjusted Therapist goals for Patient:   Short Term Goals: To be achieved in: 2 weeks  1. Independent in HEP and progression per patient tolerance, in order to prevent re-injury. [] Progressing: [x] Met: [] Not Met: [] Adjusted   2. Patient will have a decrease in pain to facilitate improvement in movement, function, and ADLs as indicated by Functional Deficits. [] Progressing: [x] Met: [] Not Met: [] Adjusted    Long Term Goals: To be achieved in: 4 weeks  1. Disability index score of 20% or less for the LEFS to assist with reaching prior level of function. [] Progressing: [x] Met: [] Not Met: [] Adjusted  2. Patient will demonstrate increased AROM to WNL to allow for proper joint functioning as indicated by patients Functional Deficits. [] Progressing: [x] Met: [] Not Met: [] Adjusted  3. Patient will demonstrate an increase in Strength to good proximal hip strength and control in LE to allow for proper functional mobility as indicated by patients Functional Deficits. [] Progressing: [x] Met: [] Not Met: [] Adjusted  4. Patient will return to Independent functional activities without increased symptoms or restriction. [] Progressing: [x] Met: [] Not Met: [] Adjusted  5. Patient will report returning to running for 1 mile or longer with no increase in pain. [] Progressing: [x] Met: [] Not Met: [] Adjusted     Overall Progression Towards Functional goals/ Treatment Progress Update:  [x] Patient is progressing as expected towards functional goals listed. [] Progression is slowed due to complexities/Impairments listed. [] Progression has been slowed due to co-morbidities.   [] Plan just implemented, too soon to assess goals progression <30days   [] Goals require adjustment due to lack of progress  [] Patient is not progressing as expected and requires additional follow up with physician  [] Other    Prognosis for POC: [x] Good [] Fair  [] Poor Patient requires continued skilled intervention: [] Yes  [x] No     Treatment/Activity Tolerance:  [x] Patient able to complete treatment  [] Patient limited by fatigue  [] Patient limited by pain     [] Patient limited by other medical complications  [x] Other: 12/22 Discharge to [facility]. Progress as tolerated. Patient Education:     PLAN:   [] Continue per plan of care [] Alter current plan (see comments above)  [] Plan of care initiated [] Hold pending MD visit [x] Discharge      Electronically signed by:  Yue Multani PT, DPT    Note: If patient does not return for scheduled/ recommended follow up visits, this note will serve as a discharge from care along with most recent update on progress.

## 2021-03-09 ENCOUNTER — OFFICE VISIT (OUTPATIENT)
Dept: RHEUMATOLOGY | Age: 26
End: 2021-03-09
Payer: COMMERCIAL

## 2021-03-09 VITALS — TEMPERATURE: 98.4 F | WEIGHT: 156 LBS | BODY MASS INDEX: 25.07 KG/M2 | HEIGHT: 66 IN

## 2021-03-09 DIAGNOSIS — M19.90 INFLAMMATORY ARTHRITIS: Primary | ICD-10-CM

## 2021-03-09 DIAGNOSIS — Z79.899 HIGH RISK MEDICATION USE: ICD-10-CM

## 2021-03-09 PROCEDURE — 99214 OFFICE O/P EST MOD 30 MIN: CPT | Performed by: INTERNAL MEDICINE

## 2021-03-09 RX ORDER — SULFASALAZINE 500 MG/1
TABLET ORAL
Qty: 360 TABLET | Refills: 0 | Status: SHIPPED | OUTPATIENT
Start: 2021-03-09 | End: 2022-03-08

## 2021-03-09 NOTE — PROGRESS NOTES
[address], MD                                                           [address] [place], [place] [address]Sindy [address] (B) [phone] (F)      Dear Dr. Alfred Downs MD:  Please find Rheumatology assessment. Thank you for giving me the opportunity to be involved in [address] [place] [address] care and I look forward following Kassandra Aguirre along with you. If you have any questions or concerns please feel free to reach me. Note is transcribed using voice recognition software. Inadvertent computerized transcription errors may be present. Patient identification: Akua Clay: 1995,25 y.o. Sex: male     A/P  Kassandra Aguirre was seen today for follow-up. Diagnoses and all orders for this visit:    Inflammatory arthritis    High risk medication use  -     AST(SGOT) & ALT(SGPT); Standing  -     CBC Auto Differential; Standing  -     C-Reactive Protein; Standing  -     Creatinine, Serum; Standing  -     Sedimentation Rate; Standing    Other orders  -     sulfaSALAzine (AZULFIDINE) 500 MG tablet; Take  2 tab po BID      Inflammatory arthritis-nearly full improvement in his symptoms other than mild intercurrent stiffness in his hand. Pleased on sulfasalazine 1 g twice daily. Tolerating medications well. No objective findings on exam.  Plan-  Check medication safety labs, continue current dose of sulfasalazine, recommend taking diclofenac only as needed basis. Call me with any flares follow-up in 3 months. Patient indicates understanding and agrees with the management plan.   I reviewed patient's history, referral documents and electronic medical cooperative. MKS: I do not appreciate any focally tender, swollen or inflamed joints in upper or lower extremities, full range of motion all peripheral joints including his hand. Has a strong fist.  No subjective complaints either musculoskeletal wise. He does have mild periungual erythema without any other scleroderma features. DATA:   Lab Results   Component Value Date    WBC 5.3 12/09/2020    HGB 14.2 12/09/2020    HCT 43.2 12/09/2020    MCV 89.5 12/09/2020     12/09/2020         Chemistry        Component Value Date/Time     12/09/2020 1041    K 4.9 12/09/2020 1041     12/09/2020 1041    CO2 24 12/09/2020 1041    BUN 25 (H) 12/09/2020 1041    CREATININE 0.9 12/09/2020 1041        Component Value Date/Time    CALCIUM 9.1 12/09/2020 1041    ALKPHOS 78 12/09/2020 1041    AST 22 12/09/2020 1041    ALT 30 12/09/2020 1041    BILITOT 0.4 12/09/2020 1041          No results found for: [facility]  Lab Results   Component Value Date    SEDRATE 1 12/09/2020     Lab Results   Component Value Date    CRP <0.3 12/09/2020     Lab Results   Component Value Date    PHAM Negative 10/07/2020    SEDRATE 1 12/09/2020     No results found for: [facility]  Lab Results   Component Value Date    TSH 1.94 08/28/2019        A/P- See above.

## 2021-06-08 ENCOUNTER — OFFICE VISIT (OUTPATIENT)
Dept: RHEUMATOLOGY | Age: 26
End: 2021-06-08
Payer: COMMERCIAL

## 2021-06-08 VITALS
DIASTOLIC BLOOD PRESSURE: 82 MMHG | SYSTOLIC BLOOD PRESSURE: 120 MMHG | WEIGHT: 156 LBS | HEIGHT: 66 IN | BODY MASS INDEX: 25.07 KG/M2

## 2021-06-08 DIAGNOSIS — Z79.899 HIGH RISK MEDICATION USE: ICD-10-CM

## 2021-06-08 DIAGNOSIS — M19.90 INFLAMMATORY ARTHRITIS: Primary | ICD-10-CM

## 2021-06-08 PROCEDURE — 99214 OFFICE O/P EST MOD 30 MIN: CPT | Performed by: INTERNAL MEDICINE

## 2021-06-08 RX ORDER — SULFASALAZINE 500 MG/1
TABLET ORAL
Qty: 360 TABLET | Refills: 0 | Status: SHIPPED | OUTPATIENT
Start: 2021-06-08 | End: 2022-03-08

## 2021-06-08 NOTE — PROGRESS NOTES
[address] [facility], MD                                                           [address] [place] [address], Sindy [address] (Z) [phone] (F)      Dear Dr. Fidel Adams MD:  Please find Rheumatology assessment. Thank you for giving me the opportunity to be involved in [address] [place] [address] care and I look forward following Leticia Diggs along with you. If you have any questions or concerns please feel free to reach me. Note is transcribed using voice recognition software. Inadvertent computerized transcription errors may be present. Patient identification: Praveen Win: 1995,25 y.o. Sex: male     A/P  Leticia Diggs was seen today for follow-up. Diagnoses and all orders for this visit:    Inflammatory arthritis    High risk medication use  -     AST(SGOT) & ALT(SGPT); Standing  -     CBC Auto Differential; Standing  -     C-Reactive Protein; Standing  -     Creatinine, Serum; Standing  -     Sedimentation Rate; Standing    Other orders  -     sulfaSALAzine (AZULFIDINE) 500 MG tablet; Take  2 tab po BID      Inflammatory arthritis-in clinical remission other than mild intercurrent stiffness in his hands. Occasionally utilizes diclofenac, maintained on sulfasalazine 1 g twice daily. Has not had any safety labs since December 2020. Reiterated the importance of doing safety labs ASAP, advised patient to go to the lab today to complete blood work. Prescription renewed. Call us with any flares, follow-up in 3 months. Patient indicates understanding and agrees with the management plan.   I reviewed patient's history, referral documents and electronic medical judgement, alert, appears stated age and cooperative. MKS: Normal musculoskeletal examination and upper, lower extremities-no tender, swollen or inflamed joints, full range of motion all peripheral joints. Normal gait and muscle strength in the upper and lower extremities bilaterally. Skin-no rashes. Appears comfortable with normal breathing effort. DATA:   Lab Results   Component Value Date    WBC 5.3 12/09/2020    HGB 14.2 12/09/2020    HCT 43.2 12/09/2020    MCV 89.5 12/09/2020     12/09/2020         Chemistry        Component Value Date/Time     12/09/2020 1041    K 4.9 12/09/2020 1041     12/09/2020 1041    CO2 24 12/09/2020 1041    BUN 25 (H) 12/09/2020 1041    CREATININE 0.9 12/09/2020 1041        Component Value Date/Time    CALCIUM 9.1 12/09/2020 1041    ALKPHOS 78 12/09/2020 1041    AST 22 12/09/2020 1041    ALT 30 12/09/2020 1041    BILITOT 0.4 12/09/2020 1041          No results found for: [facility]  Lab Results   Component Value Date    SEDRATE 1 12/09/2020     Lab Results   Component Value Date    CRP <0.3 12/09/2020     Lab Results   Component Value Date    PHAM Negative 10/07/2020    SEDRATE 1 12/09/2020     No results found for: [facility]  Lab Results   Component Value Date    TSH 1.94 08/28/2019        A/P- See above.

## 2021-09-08 ENCOUNTER — OFFICE VISIT (OUTPATIENT)
Dept: RHEUMATOLOGY | Age: 26
End: 2021-09-08
Payer: COMMERCIAL

## 2021-09-08 VITALS
SYSTOLIC BLOOD PRESSURE: 120 MMHG | HEIGHT: 66 IN | BODY MASS INDEX: 26.52 KG/M2 | DIASTOLIC BLOOD PRESSURE: 86 MMHG | WEIGHT: 165 LBS

## 2021-09-08 DIAGNOSIS — Z79.899 HIGH RISK MEDICATION USE: ICD-10-CM

## 2021-09-08 DIAGNOSIS — M19.90 INFLAMMATORY ARTHRITIS: Primary | ICD-10-CM

## 2021-09-08 PROCEDURE — 99214 OFFICE O/P EST MOD 30 MIN: CPT | Performed by: INTERNAL MEDICINE

## 2021-09-08 RX ORDER — SULFASALAZINE 500 MG/1
TABLET ORAL
Qty: 360 TABLET | Refills: 0 | Status: SHIPPED | OUTPATIENT
Start: 2021-09-08 | End: 2022-03-08

## 2021-09-08 NOTE — PROGRESS NOTES
[address] [facility], MD                                                           [address] [place] [address] Angela Zee 51, Sindy [address] (R) [phone] (F)      Dear Dr. Josefina Delacruz MD:  Please find Rheumatology assessment. Thank you for giving me the opportunity to be involved in [address] [place] [address] care and I look forward following Julee Martin along with you. If you have any questions or concerns please feel free to reach me. Note is transcribed using voice recognition software. Inadvertent computerized transcription errors may be present. Patient identification: Carlos A Sadler: 1995,26 y.o. Sex: male     A/P  Julee Martin was seen today for follow-up. Diagnoses and all orders for this visit:    Inflammatory arthritis    High risk medication use    Other orders  -     sulfaSALAzine (AZULFIDINE) 500 MG tablet; Take2 tab twice a day      Inflammatory arthritis-other than mild subjective arthralgias across PIP joints-doing well on sulfasalazine 1 g twice daily. Safety labs are normal.  Prescriptions authorized. Recommend taking diclofenac as needed for worsening arthralgias. Call us with any overt flares. follow-up in 3 months. Patient indicates understanding and agrees with the management plan. I reviewed patient's history, referral documents and electronic medical records. #######################################################################    Follow-up for inflammatory polyarthritis-negative RA serologies PHAM HLA-B27 and hepatitis serologies    Interval changes-he has been experiencing some discomfort across his PIP joints, states that once in a while he feels that way, otherwise doing fairly well.   Normal ADLs and recreational activities. Tolerating medications well. Safety labs are normal.     Rest of review of systems are negative. Past Medical History:   Diagnosis Date    ADHD     Anxiety     Depression      Past Surgical History:   Procedure Laterality Date    SHOULDER SURGERY Right 12/2014         No family history of autoimmune diseases    Current Outpatient Medications   Medication Sig Dispense Refill    sulfaSALAzine (AZULFIDINE) 500 MG tablet Take2 tab twice a day 360 tablet 0    diclofenac (VOLTAREN) 75 MG EC tablet TAKE 1 TABLET BY MOUTH TWICE DAILY 60 tablet 0    sulfaSALAzine (AZULFIDINE) 500 MG tablet Take  2 tab po  tablet 0    sulfaSALAzine (AZULFIDINE) 500 MG tablet Take  2 tab po  tablet 0    sulfaSALAzine (AZULFIDINE) 500 MG tablet Take 2 tab po  tablet 0    cyclobenzaprine (FLEXERIL) 5 MG tablet TAKE 1 TABLET BY MOUTH THREE TIMES DAILY AS NEEDED FOR MUSCLE SPASMS 90 tablet 1    Lisdexamfetamine Dimesylate (VYVANSE) 60 MG CAPS Take 1 capsule by mouth daily . No current facility-administered medications for this visit. No Known Allergies    PHYSICAL EXAM:    Vitals:    /86   Ht 5' 6\" (1.676 m)   Wt 165 lb (74.8 kg)   BMI 26.63 kg/m²   General appearance/ Psychiatric: well nourished, and well groomed, normal judgement, alert, appears stated age and cooperative. MKS: Other than subjective discomfort across PIP joints specially right hand-normal musculoskeletal examination and upper, lower extremities and spine. Skin-no rashes. Appears comfortable with normal breathing effort.     DATA:   Lab Results   Component Value Date    WBC 6.9 09/07/2021    HGB 15.4 09/07/2021    HCT 46.7 09/07/2021    MCV 91.8 09/07/2021     09/07/2021         Chemistry        Component Value Date/Time     12/09/2020 1041    K 4.9 12/09/2020 1041     12/09/2020 1041    CO2 24 12/09/2020 1041    BUN 25 (H) 12/09/2020 1041    CREATININE 1.1 09/07/2021 1140 Component Value Date/Time    CALCIUM 9.1 12/09/2020 1041    ALKPHOS 78 12/09/2020 1041    AST 24 09/07/2021 1140    ALT 45 (H) 09/07/2021 1140    BILITOT 0.4 12/09/2020 1041          No results found for: [facility]  Lab Results   Component Value Date    SEDRATE 3 09/07/2021     Lab Results   Component Value Date    CRP <3.0 09/07/2021     Lab Results   Component Value Date    PHAM Negative 10/07/2020    SEDRATE 3 09/07/2021     No results found for: [facility]OTAL  Lab Results   Component Value Date    TSH 1.94 08/28/2019        A/P- See above. Total time 31 minutes that includes the following-  Preparing to see the patient such as reviewing patients records, pre-charting, preparing the visit on the same day, performing a medically appropriate history and physical examination, counseling and educating patient about diagnosis, management plan, ordering appropriate testings, prescriptions,   and documenting clinical information in electronic medical record.

## 2021-10-13 RX ORDER — DICLOFENAC SODIUM 75 MG/1
TABLET, DELAYED RELEASE ORAL
Qty: 60 TABLET | Refills: 0 | OUTPATIENT
Start: 2021-10-13

## 2021-10-13 NOTE — TELEPHONE ENCOUNTER
Medication:   Requested Prescriptions     Pending Prescriptions Disp Refills    diclofenac (VOLTAREN) 75 MG EC tablet [Pharmacy Med Name: DICLOFENAC SODIUM 75MG DR TABLETS] 60 tablet 0     Sig: TAKE 1 TABLET BY MOUTH TWICE DAILY     Last Filled: 8.18.21    Last appt: 9/18/2020   Next appt: Visit date not found    Last OARRS: No flowsheet data found.

## 2021-10-20 RX ORDER — DICLOFENAC SODIUM 75 MG/1
TABLET, DELAYED RELEASE ORAL
Qty: 60 TABLET | Refills: 0 | OUTPATIENT
Start: 2021-10-20

## 2021-10-20 NOTE — TELEPHONE ENCOUNTER
Medication:   Requested Prescriptions     Pending Prescriptions Disp Refills    diclofenac (VOLTAREN) 75 MG EC tablet [Pharmacy Med Name: DICLOFENAC SODIUM 75MG DR TABLETS] 60 tablet 0     Sig: TAKE 1 TABLET BY MOUTH TWICE DAILY     Last Filled:  08/18/21    Last appt: 9/18/2020   Next appt: Visit date not found    Last OARRS: No flowsheet data found.

## 2021-10-20 NOTE — TELEPHONE ENCOUNTER
Call patient. He needs appointment for any refills from me or our office because he was last seen over a year ago on 9/18/2020. He can contact Rheumatologist, Dr. Antonella Ruth for the refill for Diclofenac since he has seen her recently.

## 2021-12-08 ENCOUNTER — OFFICE VISIT (OUTPATIENT)
Dept: RHEUMATOLOGY | Age: 26
End: 2021-12-08
Payer: COMMERCIAL

## 2021-12-08 VITALS
DIASTOLIC BLOOD PRESSURE: 76 MMHG | WEIGHT: 165 LBS | HEIGHT: 66 IN | SYSTOLIC BLOOD PRESSURE: 118 MMHG | BODY MASS INDEX: 26.52 KG/M2

## 2021-12-08 DIAGNOSIS — M19.90 INFLAMMATORY ARTHRITIS: ICD-10-CM

## 2021-12-08 DIAGNOSIS — Z79.899 HIGH RISK MEDICATION USE: Primary | ICD-10-CM

## 2021-12-08 LAB
ALT SERPL-CCNC: 18 U/L (ref 10–40)
AST SERPL-CCNC: 17 U/L (ref 15–37)
BASOPHILS ABSOLUTE: 0 K/UL (ref 0–0.2)
BASOPHILS RELATIVE PERCENT: 0.5 %
C-REACTIVE PROTEIN: <3 MG/L (ref 0–5.1)
CREAT SERPL-MCNC: 0.9 MG/DL (ref 0.9–1.3)
EOSINOPHILS ABSOLUTE: 0.2 K/UL (ref 0–0.6)
EOSINOPHILS RELATIVE PERCENT: 3.4 %
GFR AFRICAN AMERICAN: >60
GFR NON-AFRICAN AMERICAN: >60
HCT VFR BLD CALC: 44.8 % (ref 40.5–52.5)
HEMOGLOBIN: 14.7 G/DL (ref 13.5–17.5)
LYMPHOCYTES ABSOLUTE: 1.1 K/UL (ref 1–5.1)
LYMPHOCYTES RELATIVE PERCENT: 21.7 %
MCH RBC QN AUTO: 30 PG (ref 26–34)
MCHC RBC AUTO-ENTMCNC: 32.8 G/DL (ref 31–36)
MCV RBC AUTO: 91.4 FL (ref 80–100)
MONOCYTES ABSOLUTE: 0.5 K/UL (ref 0–1.3)
MONOCYTES RELATIVE PERCENT: 10.2 %
NEUTROPHILS ABSOLUTE: 3.3 K/UL (ref 1.7–7.7)
NEUTROPHILS RELATIVE PERCENT: 64.2 %
PDW BLD-RTO: 13.1 % (ref 12.4–15.4)
PLATELET # BLD: 183 K/UL (ref 135–450)
PMV BLD AUTO: 8.9 FL (ref 5–10.5)
RBC # BLD: 4.9 M/UL (ref 4.2–5.9)
SEDIMENTATION RATE, ERYTHROCYTE: 2 MM/HR (ref 0–15)
WBC # BLD: 5.1 K/UL (ref 4–11)

## 2021-12-08 PROCEDURE — 99214 OFFICE O/P EST MOD 30 MIN: CPT | Performed by: INTERNAL MEDICINE

## 2021-12-08 RX ORDER — SULFASALAZINE 500 MG/1
TABLET ORAL
Qty: 540 TABLET | Refills: 0 | Status: SHIPPED | OUTPATIENT
Start: 2021-12-08 | End: 2022-09-12 | Stop reason: SDUPTHER

## 2021-12-08 RX ORDER — PREDNISONE 10 MG/1
TABLET ORAL
Qty: 15 TABLET | Refills: 0 | Status: SHIPPED | OUTPATIENT
Start: 2021-12-08 | End: 2022-03-08

## 2021-12-08 NOTE — PROGRESS NOTES
RA serologies PHAM HLA-B27 and hepatitis serologies    Interval changes-he has been experiencing more pain, swelling, stiffness across MCP joints specially right hand that began in October. Utilizes ibuprofen only pain is unbearable. But feels it every day. AM stiffness for half an hour to 1 hour sometimes longer. Compliant with sulfasalazine. Tolerating medications well. Safety labs are normal.       Rest of review of systems are negative. Past Medical History:   Diagnosis Date    ADHD     Anxiety     Depression      Past Surgical History:   Procedure Laterality Date    SHOULDER SURGERY Right 12/2014         No family history of autoimmune diseases    Current Outpatient Medications   Medication Sig Dispense Refill    predniSONE (DELTASONE) 10 MG tablet Take 2 tab daily 4 days. 1 tab po daily 4 days. 1/2 tab po daily x 4 days and stop. 15 tablet 0    sulfaSALAzine (AZULFIDINE) 500 MG tablet Take 3 tab po  tablet 0    sulfaSALAzine (AZULFIDINE) 500 MG tablet Take2 tab twice a day 360 tablet 0    sulfaSALAzine (AZULFIDINE) 500 MG tablet Take  2 tab po  tablet 0    sulfaSALAzine (AZULFIDINE) 500 MG tablet Take  2 tab po  tablet 0    sulfaSALAzine (AZULFIDINE) 500 MG tablet Take 2 tab po  tablet 0    Lisdexamfetamine Dimesylate (VYVANSE) 60 MG CAPS Take 1 capsule by mouth daily . No current facility-administered medications for this visit. No Known Allergies    PHYSICAL EXAM:    Vitals:    /76   Ht 5' 6\" (1.676 m)   Wt 165 lb (74.8 kg)   BMI 26.63 kg/m²   General appearance/ Psychiatric: well nourished, and well groomed, normal judgement, alert, appears stated age and cooperative. MKS: Mild tenderness right hand second third fourth MP joint. Mild fullness noted in the joints. No overtly tender, swollen or inflamed joints appreciated. ROM in all peripheral joints including full fist.  Skin-no rashes.   Appears comfortable with normal breathing effort. DATA:   Lab Results   Component Value Date    WBC 6.9 09/07/2021    HGB 15.4 09/07/2021    HCT 46.7 09/07/2021    MCV 91.8 09/07/2021     09/07/2021         Chemistry        Component Value Date/Time     12/09/2020 1041    K 4.9 12/09/2020 1041     12/09/2020 1041    CO2 24 12/09/2020 1041    BUN 25 (H) 12/09/2020 1041    CREATININE 1.1 09/07/2021 1140        Component Value Date/Time    CALCIUM 9.1 12/09/2020 1041    ALKPHOS 78 12/09/2020 1041    AST 24 09/07/2021 1140    ALT 45 (H) 09/07/2021 1140    BILITOT 0.4 12/09/2020 1041          No results found for: [facility]  Lab Results   Component Value Date    SEDRATE 3 09/07/2021     Lab Results   Component Value Date    CRP <3.0 09/07/2021     Lab Results   Component Value Date    PHAM Negative 10/07/2020    SEDRATE 3 09/07/2021     No results found for: [facility]  Lab Results   Component Value Date    TSH 1.94 08/28/2019        A/P- See above. Total time 32 minutes that includes the following-  Preparing to see the patient such as reviewing patients records, pre-charting, preparing the visit on the same day, performing a medically appropriate history and physical examination, counseling and educating patient about diagnosis, management plan, ordering appropriate testings, prescriptions,   and documenting clinical information in electronic medical record.

## 2022-03-08 ENCOUNTER — OFFICE VISIT (OUTPATIENT)
Dept: RHEUMATOLOGY | Age: 27
End: 2022-03-08
Payer: COMMERCIAL

## 2022-03-08 VITALS
HEIGHT: 66 IN | BODY MASS INDEX: 26.52 KG/M2 | DIASTOLIC BLOOD PRESSURE: 82 MMHG | SYSTOLIC BLOOD PRESSURE: 120 MMHG | WEIGHT: 165 LBS

## 2022-03-08 DIAGNOSIS — Z79.899 HIGH RISK MEDICATION USE: ICD-10-CM

## 2022-03-08 DIAGNOSIS — M06.00 SERONEGATIVE RHEUMATOID ARTHRITIS (HCC): Primary | ICD-10-CM

## 2022-03-08 LAB
ALT SERPL-CCNC: 21 U/L (ref 10–40)
AST SERPL-CCNC: 19 U/L (ref 15–37)
BASOPHILS ABSOLUTE: 0 K/UL (ref 0–0.2)
BASOPHILS RELATIVE PERCENT: 0.4 %
C-REACTIVE PROTEIN: <3 MG/L (ref 0–5.1)
CREAT SERPL-MCNC: 1.1 MG/DL (ref 0.9–1.3)
EOSINOPHILS ABSOLUTE: 0.2 K/UL (ref 0–0.6)
EOSINOPHILS RELATIVE PERCENT: 4.2 %
GFR AFRICAN AMERICAN: >60
GFR NON-AFRICAN AMERICAN: >60
HCT VFR BLD CALC: 44.6 % (ref 40.5–52.5)
HEMOGLOBIN: 14.7 G/DL (ref 13.5–17.5)
LYMPHOCYTES ABSOLUTE: 1.1 K/UL (ref 1–5.1)
LYMPHOCYTES RELATIVE PERCENT: 21 %
MCH RBC QN AUTO: 30.5 PG (ref 26–34)
MCHC RBC AUTO-ENTMCNC: 32.9 G/DL (ref 31–36)
MCV RBC AUTO: 92.8 FL (ref 80–100)
MONOCYTES ABSOLUTE: 0.6 K/UL (ref 0–1.3)
MONOCYTES RELATIVE PERCENT: 11.3 %
NEUTROPHILS ABSOLUTE: 3.4 K/UL (ref 1.7–7.7)
NEUTROPHILS RELATIVE PERCENT: 63.1 %
PDW BLD-RTO: 13.4 % (ref 12.4–15.4)
PLATELET # BLD: 208 K/UL (ref 135–450)
PMV BLD AUTO: 8.5 FL (ref 5–10.5)
RBC # BLD: 4.81 M/UL (ref 4.2–5.9)
SEDIMENTATION RATE, ERYTHROCYTE: <1 MM/HR (ref 0–15)
WBC # BLD: 5.3 K/UL (ref 4–11)

## 2022-03-08 PROCEDURE — 99214 OFFICE O/P EST MOD 30 MIN: CPT | Performed by: INTERNAL MEDICINE

## 2022-03-08 RX ORDER — DULOXETIN HYDROCHLORIDE 30 MG/1
30 CAPSULE, DELAYED RELEASE ORAL DAILY
COMMUNITY
Start: 2022-01-06

## 2022-03-08 RX ORDER — SULFASALAZINE 500 MG/1
TABLET ORAL
Qty: 540 TABLET | Refills: 0 | Status: SHIPPED | OUTPATIENT
Start: 2022-03-08 | End: 2022-06-13 | Stop reason: SDUPTHER

## 2022-03-08 NOTE — PROGRESS NOTES
[address], MD                                                           [address] [place] [address], Sindy [address] (q) [phone] (I)      Dear Dr. Julee Ohara MD:  Please find Rheumatology assessment. Thank you for giving me the opportunity to be involved in [address] [place] [address] care and I look forward following Mary Guzman along with you. If you have any questions or concerns please feel free to reach me. Note is transcribed using voice recognition software. Inadvertent computerized transcription errors may be present. Patient identification: Governor End: 1995,26 y.o. Sex: male     A/P  Mary Guzman was seen today for follow-up. Diagnoses and all orders for this visit:    Seronegative rheumatoid arthritis ([address]    High risk medication use  -     AST(SGOT) & ALT(SGPT)  -     CBC with Auto Differential  -     C-Reactive Protein  -     Sedimentation Rate  -     Creatinine    Other orders  -     sulfaSALAzine (AZULFIDINE) 500 MG tablet; Take 3 tab po BID      Seronegative RA -predominantly affecting finger joints, wrist-stable on sulfasalazine 1.5 g twice daily. No overt flares of arthritis. Plan-  Safety lab will be checked today, prescription renewed. Call with any overt flares. Follow-up in 3 months. Patient indicates understanding and agrees with the management plan. I reviewed patient's history, referral documents and electronic medical records.     ###################################################################    Follow-up for inflammatory polyarthritis-negative RA serologies PHAM HLA-B27 and hepatitis serologies    Interval changes-  States that other than minor intercurrent arthralgias in his finger joints repetitive use, and intercurrent discomfort in his shoulders-he is doing fairly well. Has not been taking ibuprofen regularly. Compliant with sulfasalazine. No adverse events from medications. No arthritis flares. Tolerating medications well. Safety labs are normal.       Rest of review of systems are negative. Past Medical History:   Diagnosis Date    ADHD     Anxiety     Depression      Past Surgical History:   Procedure Laterality Date    SHOULDER SURGERY Right 12/2014         No family history of autoimmune diseases    Current Outpatient Medications   Medication Sig Dispense Refill    DULoxetine (CYMBALTA) 30 MG extended release capsule Take 30 capsules by mouth daily      sulfaSALAzine (AZULFIDINE) 500 MG tablet Take 3 tab po  tablet 0    sulfaSALAzine (AZULFIDINE) 500 MG tablet Take 3 tab po  tablet 0    Lisdexamfetamine Dimesylate (VYVANSE) 60 MG CAPS Take 1 capsule by mouth daily . No current facility-administered medications for this visit. No Known Allergies    PHYSICAL EXAM:    Vitals:    /82   Ht 5' 6\" (1.676 m)   Wt 165 lb (74.8 kg)   BMI 26.63 kg/m²   General appearance/ Psychiatric: well nourished, and well groomed, normal judgement, alert, appears stated age and cooperative. MKS: No appreciable tender, swollen, inflamed joints in his upper or lower extremities including finger joints. Full range of motion of peripheral joints. Skin-no rashes. Appears comfortable with normal breathing effort.     DATA:   Lab Results   Component Value Date    WBC 5.3 03/08/2022    HGB 14.7 03/08/2022    HCT 44.6 03/08/2022    MCV 92.8 03/08/2022     03/08/2022         Chemistry        Component Value Date/Time     12/09/2020 1041    K 4.9 12/09/2020 1041     12/09/2020 1041    CO2 24 12/09/2020 1041    BUN 25 (H) 12/09/2020 1041    CREATININE 0.9 12/08/2021 1144        Component Value Date/Time    CALCIUM 9.1 12/09/2020 1041 ALKPHOS 78 12/09/2020 1041    AST 17 12/08/2021 1144    ALT 18 12/08/2021 1144    BILITOT 0.4 12/09/2020 1041          No results found for: [facility]  Lab Results   Component Value Date    SEDRATE 2 12/08/2021     Lab Results   Component Value Date    CRP <3.0 12/08/2021     Lab Results   Component Value Date    PHAM Negative 10/07/2020    SEDRATE 2 12/08/2021     No results found for: [facility]  Lab Results   Component Value Date    TSH 1.94 08/28/2019        A/P- See above. Total time 31 minutes that includes the following-  Preparing to see the patient such as reviewing patients records, pre-charting, preparing the visit on the same day, performing a medically appropriate history and physical examination, counseling and educating patient about diagnosis, management plan, ordering appropriate testings, prescriptions,   and documenting clinical information in electronic medical record.

## 2022-06-06 RX ORDER — SULFASALAZINE 500 MG/1
TABLET ORAL
Qty: 540 TABLET | Refills: 0 | OUTPATIENT
Start: 2022-06-06

## 2022-06-13 ENCOUNTER — OFFICE VISIT (OUTPATIENT)
Dept: RHEUMATOLOGY | Age: 27
End: 2022-06-13
Payer: COMMERCIAL

## 2022-06-13 VITALS
SYSTOLIC BLOOD PRESSURE: 128 MMHG | DIASTOLIC BLOOD PRESSURE: 82 MMHG | HEIGHT: 66 IN | BODY MASS INDEX: 26.68 KG/M2 | WEIGHT: 166 LBS

## 2022-06-13 DIAGNOSIS — Z79.899 HIGH RISK MEDICATION USE: ICD-10-CM

## 2022-06-13 DIAGNOSIS — M06.00 SERONEGATIVE RHEUMATOID ARTHRITIS (HCC): Primary | ICD-10-CM

## 2022-06-13 LAB
ALT SERPL-CCNC: 23 U/L (ref 10–40)
AST SERPL-CCNC: 20 U/L (ref 15–37)
BASOPHILS ABSOLUTE: 0 K/UL (ref 0–0.2)
BASOPHILS RELATIVE PERCENT: 0.3 %
C-REACTIVE PROTEIN: <3 MG/L (ref 0–5.1)
CREAT SERPL-MCNC: 0.9 MG/DL (ref 0.9–1.3)
EOSINOPHILS ABSOLUTE: 0.1 K/UL (ref 0–0.6)
EOSINOPHILS RELATIVE PERCENT: 3.3 %
GFR AFRICAN AMERICAN: >60
GFR NON-AFRICAN AMERICAN: >60
HCT VFR BLD CALC: 43.2 % (ref 40.5–52.5)
HEMOGLOBIN: 14.2 G/DL (ref 13.5–17.5)
LYMPHOCYTES ABSOLUTE: 1.1 K/UL (ref 1–5.1)
LYMPHOCYTES RELATIVE PERCENT: 24.8 %
MCH RBC QN AUTO: 30 PG (ref 26–34)
MCHC RBC AUTO-ENTMCNC: 32.8 G/DL (ref 31–36)
MCV RBC AUTO: 91.3 FL (ref 80–100)
MONOCYTES ABSOLUTE: 0.4 K/UL (ref 0–1.3)
MONOCYTES RELATIVE PERCENT: 10.1 %
NEUTROPHILS ABSOLUTE: 2.7 K/UL (ref 1.7–7.7)
NEUTROPHILS RELATIVE PERCENT: 61.5 %
PDW BLD-RTO: 13.4 % (ref 12.4–15.4)
PLATELET # BLD: 193 K/UL (ref 135–450)
PMV BLD AUTO: 8.6 FL (ref 5–10.5)
RBC # BLD: 4.73 M/UL (ref 4.2–5.9)
SEDIMENTATION RATE, ERYTHROCYTE: <1 MM/HR (ref 0–15)
WBC # BLD: 4.4 K/UL (ref 4–11)

## 2022-06-13 PROCEDURE — 99214 OFFICE O/P EST MOD 30 MIN: CPT | Performed by: INTERNAL MEDICINE

## 2022-06-13 RX ORDER — SULFASALAZINE 500 MG/1
TABLET ORAL
Qty: 540 TABLET | Refills: 0 | Status: SHIPPED | OUTPATIENT
Start: 2022-06-13 | End: 2022-09-12 | Stop reason: SDUPTHER

## 2022-06-13 NOTE — PROGRESS NOTES
[address], MD                                                           [address] [place], [place] [address]ia [facility], [address] [place] [address]                                                             [phone] (P) [phone] (F)         Note is transcribed using voice recognition software. Inadvertent computerized transcription errors may be present. Patient identification: Kimberlee Lew: 1995,26 y.o. Sex: male     A/P  Joel Knee was seen today for follow-up. Diagnoses and all orders for this visit:    Seronegative rheumatoid arthritis ([facility] [address]  -     sulfaSALAzine (AZULFIDINE) 500 MG tablet; Take 3 tab po BID    High risk medication use  -     AST(SGOT) & ALT(SGPT); Standing  -     CBC with Auto Differential; Standing  -     C-Reactive Protein; Standing  -     Creatinine; Standing  -     Sedimentation Rate; Standing      Seronegative RA -predominantly affecting finger joints, wrist-asymptomatic on sulfasalazine 1.5 g twice daily. No overt flares of arthritis. Plan-  Safety labs today. Prescription renewed. Call with any overt flares. Follow-up in 3 months. Patient indicates understanding and agrees with the management plan. I reviewed patient's history, referral documents and electronic medical records. ###################################################################    Follow-up for inflammatory polyarthritis-negative RA serologies PHAM HLA-B27 and hepatitis serologies    Interval changes-  He is doing well, denies any pain, swelling, stiffness in any joints. He did run out of sulfasalazine couple of weeks ago, fortunately no flares. Tolerating medications well. Normal ADLs and recreational activities. No adverse events from medications. No arthritis flares.    Tolerating medications well.  Safety labs are normal.       Rest of review of systems are negative. Past Medical History:   Diagnosis Date    ADHD     Anxiety     Depression      Past Surgical History:   Procedure Laterality Date    SHOULDER SURGERY Right 12/2014         No family history of autoimmune diseases    Current Outpatient Medications   Medication Sig Dispense Refill    sulfaSALAzine (AZULFIDINE) 500 MG tablet Take 3 tab po  tablet 0    DULoxetine (CYMBALTA) 30 MG extended release capsule Take 30 capsules by mouth daily      sulfaSALAzine (AZULFIDINE) 500 MG tablet Take 3 tab po  tablet 0    Lisdexamfetamine Dimesylate (VYVANSE) 60 MG CAPS Take 1 capsule by mouth daily . No current facility-administered medications for this visit. No Known Allergies    PHYSICAL EXAM:    Vitals:    /82   Ht 5' 6\" (1.676 m)   Wt 166 lb (75.3 kg)   BMI 26.79 kg/m²   General appearance/ Psychiatric: well nourished, and well groomed, normal judgement, alert, appears stated age and cooperative. MKS: No appreciable tender, swollen, inflamed joints in upper or lower extremities. Normal range of motion in peripheral joints in upper and lower extremities. Normal gait and muscle strength in upper and lower extremities. Skin-no rashes. Appears comfortable with normal breathing effort.     DATA:   Lab Results   Component Value Date    WBC 5.3 03/08/2022    HGB 14.7 03/08/2022    HCT 44.6 03/08/2022    MCV 92.8 03/08/2022     03/08/2022         Chemistry        Component Value Date/Time     12/09/2020 1041    K 4.9 12/09/2020 1041     12/09/2020 1041    CO2 24 12/09/2020 1041    BUN 25 (H) 12/09/2020 1041    CREATININE 1.1 03/08/2022 1203        Component Value Date/Time    CALCIUM 9.1 12/09/2020 1041    ALKPHOS 78 12/09/2020 1041    AST 19 03/08/2022 1203    ALT 21 03/08/2022 1203    BILITOT 0.4 12/09/2020 1041          No results found for: [facility]  Lab Results   Component Value Date    SEDRATE <1 03/08/2022     Lab Results   Component Value Date    CRP <3.0 03/08/2022     Lab Results   Component Value Date    PHAM Negative 10/07/2020    SEDRATE <1 03/08/2022     No results found for: [facility]  Lab Results   Component Value Date    TSH 1.94 08/28/2019        A/P- See above. Total time 30 minutes that includes the following-  Preparing to see the patient such as reviewing patients records, pre-charting, preparing the visit on the same day, performing a medically appropriate history and physical examination, counseling and educating patient about diagnosis, management plan, ordering appropriate testings, prescriptions,   and documenting clinical information in electronic medical record.

## 2022-06-13 NOTE — FLOWSHEET NOTE
The [address] [facility] [address], [address]    [facility]       Physical Therapy Daily Treatment Note  Date:  10/19/2020    Patient Name:  Nicole Covarrubias    :  1995  MRN: [id number]  Restrictions/Precautions:    Medical/Treatment Diagnosis Information:  · Diagnosis: M76.60 (ICD-10-CM) - Achilles tendon pain  · Treatment Diagnosis: M25.571 Pain in Right Ankle  Insurance/Certification information:  PT Insurance Information: [employer]  Physician Information:  Referring Practitioner: Julianna Wetzel MD  Has the plan of care been signed (Y/N):        []  Yes  [x]  No     Date of Patient follow up with Physician: prn with PCP      Is this a Progress Report:     []  Yes  [x]  No        If Yes:  Date Range for reporting period:  Beginning  Ending    Progress report will be due (10 Rx or 30 days whichever is less):        Recertification will be due (POC Duration  / 90 days whichever is less):          Visit # Insurance Allowable Auth Required   12  10/19 60 []  Yes [x]  No        Functional Scale: LEFS: 47.5% deficit   Date assessed: 20       Latex Allergy:  [x]NO      []YES  Preferred Language for Healthcare:   [x]English       []other:      Pain level:  0/10  Soreness present  10/16    SUBJECTIVE:  10/19 Patient states that his ankle is sore but he was able to play soccer this weekend in the boot.          OBJECTIVE:   ROM   PROM AROM Comments    Left Right Left Right    Dorsiflexion   1 pre-tx, 3 post-tx Mild pain   Plantarflexion   62    Inversion   38    Eversion   12                      Strength  9/8 Left Right Comments   Dorsiflexion 5 5    Plantarflexion 5 4+    Inversion 5 5    Eversion 5 4+        Reflexes/Sensation:    [x]Dermatomes/Myotomes intact    [x]Reflexes equal and normal bilaterally   []Other:    Joint mobility:    [x]Normal    []Hypo   []Hyper    Palpation: TTP along R achilles tendon, FHL amd post tib in talar tunnel and into plantar fascia; mild edema present  9/25    Functional Mobility/Transfers: Independent with ADL's and self care; unable to walk and run; unable to work out; pain and difficulty with stairs and squatting   9/8    Posture: WNL  9/8    Gait: (include devices/WB status) very mild antalgic gait present with walking boot w dec WB time on R  9/25                       [x] Patient history, allergies, meds reviewed. Medical chart reviewed. See intake form. 9/8    Review Of Systems (ROS):   [x]Performed Review of systems (Integumentary, CardioPulmonary, Neurological) by intake and observation. Intake form has been scanned into medical record. Patient has been instructed to contact their primary care physician regarding ROS issues if not already being addressed at this time.   9/8      RESTRICTIONS/PRECAUTIONS: None    Exercises/Interventions:     Exercise/Equipment Resistance/Repetitions Other comments   ROM     ABC'S     BAPS     Bottle Roll     Inversion/Eversion     Ankle Pumps 30x    Toe Curls     Rocks     Towels     Self TC joint mobilization with stool 30x Start 10/13   Stretching     Circles 30x CW and CCW    Toe Extension      Towel Pull 1 30 sec x 5 Gastroc    Towel Pull 2 30 sec x 5 Soleus    ERMI     Incline Stretch     Pro-Stretch     Hamstring 30 sec x 5 Start 9/11   Stair Stretch     Calf 1 (standing gastroc)    Calf 2 (standing soleus)         Isometrics     Dorsiflexion Start 9/11   Plantarflexion Start 9/11   Inversion Start 9/11   Eversion Start 9/11        PRE's     Heel walk     Toe walk     SLR     Calf Raises on leg press 3 x 10; 40# Start 10/19   Step Up     Knee Extension     Hamstring Curls     Leg Pres 3 x 10; 100# Start 10/19   Seated SL heel raises 3 x 10 ^10/13   Bike 5 min Start 10/16   Balance:     SLB 2x30\" R    Rocker Board each 30 sec x 3 Start 9/29   BOSU     SLS     Aeromat     Foam Roll     Plyoback     Tandem Stance     Biodex     Bike     Treadmill (Alter G) 10 min Start 10/19 Manual interventions     MFR posterior R calf and achilles tendon, FLH, post tib, plantar fascia 15 min Start  9/8       Therapeutic Exercise and NMR EXR  [x] (07106) Provided verbal/tactile cueing for activities related to strengthening, flexibility, endurance, ROM for improvements in LE, proximal hip, and core control with self care, mobility, lifting, ambulation. [x] (24337) Provided verbal/tactile cueing for activities related to improving balance, coordination, kinesthetic sense, posture, motor skill, proprioception  to assist with LE, proximal hip, and core control in self care, mobility, lifting, ambulation and eccentric single leg control. NMR and Therapeutic Activities:    [x] (15949 or 96514) Provided verbal/tactile cueing for activities related to improving balance, coordination, kinesthetic sense, posture, motor skill, proprioception and motor activation to allow for proper function of core, proximal hip and LE with self care and ADLs  [] (89021) Gait Re-education- Provided training and instruction to the patient for proper LE, core and proximal hip recruitment and positioning and eccentric body weight control with ambulation re-education including up and down stairs     Home Exercise Program:    [x] (57310) Reviewed/Progressed HEP activities related to strengthening, flexibility, endurance, ROM of core, proximal hip and LE for functional self-care, mobility, lifting and ambulation/stair navigation   [] (50024)Reviewed/Progressed HEP activities related to improving balance, coordination, kinesthetic sense, posture, motor skill, proprioception of core, proximal hip and LE for self care, mobility, lifting, and ambulation/stair navigation     Access Code: [email or web address]   URL: [email or web address]   Date: 09/08/2020   Prepared by: Lorenzo Figueroa   Manual Treatments:  PROM / STM / Oscillations-Mobs:  G-I, II, III, IV (PA's, Inf., Post.)  [x] (82897) Provided manual therapy to mobilize LE, proximal hip and/or LS spine soft tissue/joints for the purpose of modulating pain, promoting relaxation,  increasing ROM, reducing/eliminating soft tissue swelling/inflammation/restriction, improving soft tissue extensibility and allowing for proper ROM for normal function with self care, mobility, lifting and ambulation. Modalities:  Cont US 1.4W/cm2, 3MHz at R Achilles 12 min  Charges:  Timed Code Treatment Minutes: 45   Total Treatment Minutes: 50     [] EVAL (LOW) 62660 (typically 20 minutes face-to-face)  [] EVAL (MOD) 52853 (typically 30 minutes face-to-face)  [] EVAL (HIGH) 45296 (typically 45 minutes face-to-face)  [] RE-EVAL     [x] LB(35597) x 1     [] IONTO  [x] NMR (96499) x 1    [] VASO  [x] Manual (40250) x 1     [] Other: US 12 min    [] TA x      [] Mech Traction (23865)  [] ES(attended) (71812)      [] ES (un) (34080):     GOALS:   Patient stated goal:  Reduce pain; return to walking and running  [] Progressing: [] Met: [] Not Met: [] Adjusted    Therapist goals for Patient:   Short Term Goals: To be achieved in: 2 weeks  1. Independent in HEP and progression per patient tolerance, in order to prevent re-injury. [] Progressing: [] Met: [] Not Met: [] Adjusted   2. Patient will have a decrease in pain to facilitate improvement in movement, function, and ADLs as indicated by Functional Deficits. [] Progressing: [] Met: [] Not Met: [] Adjusted    Long Term Goals: To be achieved in: 4 weeks  1. Disability index score of 20% or less for the LEFS to assist with reaching prior level of function. [] Progressing: [] Met: [] Not Met: [] Adjusted  2. Patient will demonstrate increased AROM to WNL to allow for proper joint functioning as indicated by patients Functional Deficits. [] Progressing: [] Met: [] Not Met: [] Adjusted  3.  Patient will demonstrate an increase in Strength to good proximal hip strength and control in LE to allow for proper functional mobility as indicated by patients Functional Deficits. [] Progressing: [] Met: [] Not Met: [] Adjusted  4. Patient will return to Independent functional activities without increased symptoms or restriction. [] Progressing: [] Met: [] Not Met: [] Adjusted  5. Patient will report returning to running for 1 mile or longer with no increase in pain. [] Progressing: [] Met: [] Not Met: [] Adjusted     Overall Progression Towards Functional goals/ Treatment Progress Update:  [] Patient is progressing as expected towards functional goals listed. [] Progression is slowed due to complexities/Impairments listed. [] Progression has been slowed due to co-morbidities. [x] Plan just implemented, too soon to assess goals progression <30days   [] Goals require adjustment due to lack of progress  [] Patient is not progressing as expected and requires additional follow up with physician  [] Other    Prognosis for POC: [x] Good [] Fair  [] Poor      Patient requires continued skilled intervention: [x] Yes  [] No    Treatment/Activity Tolerance:  [x] Patient able to complete treatment  [] Patient limited by fatigue  [] Patient limited by pain     [] Patient limited by other medical complications  [x] Other: 10/19 Pt making progress. Good tolerance to program today. Reported feeling better at end of session. Continue to progress as tolerated. Patient Education:  Icing to control pain and swelling. Use of tennis ball for self massage in calf and foot at home. PLAN: See eval  [x] Continue per plan of care [] Alter current plan (see comments above)  [] Plan of care initiated [] Hold pending MD visit [] Discharge      Electronically signed by:  Dianne Robin, PT, DPT    Note: If patient does not return for scheduled/ recommended follow up visits, this note will serve as a discharge from care along with most recent update on progress. no

## 2022-07-18 RX ORDER — METHYLPREDNISOLONE 4 MG/1
TABLET ORAL
Qty: 1 KIT | Refills: 0 | Status: SHIPPED | OUTPATIENT
Start: 2022-07-18 | End: 2022-07-24

## 2022-09-09 NOTE — PROGRESS NOTES
[address], [place]                                                           [address] [place] [address]                                                             [phone] (P) [phone] (F)         Note is transcribed using voice recognition software. Inadvertent computerized transcription errors may be present. Patient identification: Lissette Pimentel: 1995,27 y.o. Sex: male     A/P  Julieta Castellanos was seen today for follow-up. Diagnoses and all orders for this visit:    Seronegative rheumatoid arthritis ([facility] [address]    High risk medication use    Other orders  -     sulfaSALAzine (AZULFIDINE) 500 MG tablet; Take 3 tab po BID    Seronegative RA -predominantly affecting finger joints, wrist-acceptable control on sulfasalazine 1.5 g twice daily. Occasional flares. Prescription renewed. Check labs to monitor medication. Call with any flares. RTC 3 months. Patient indicates understanding and agrees with the management plan. I reviewed patient's history, referral documents and electronic medical records. ###################################################################    Follow-up for inflammatory polyarthritis-negative RA serologies PHAM HLA-B27 and hepatitis serologies    Interval changes-  He continues to have mild arthralgias and stiffness across his MCPs and PIPs, no overt swelling. Did have a flare last month, needed prednisone. Able to manage symptoms on sulfasalazine. No side effects other than GI bloating at times. Safety labs have been normal.   Normal ADLs and recreational activities. Rest of review of systems are negative.   Past Medical History:   Diagnosis Date    ADHD     Anxiety     Depression      Past Surgical History:   Procedure Laterality Date    SHOULDER SURGERY Right 12/2014         No family history of autoimmune diseases    Current Outpatient Medications   Medication Sig Dispense Refill    sulfaSALAzine (AZULFIDINE) 500 MG tablet Take 3 tab po  tablet 0    DULoxetine (CYMBALTA) 30 MG extended release capsule Take 30 capsules by mouth daily      Lisdexamfetamine Dimesylate 60 MG CAPS Take 1 capsule by mouth daily . No current facility-administered medications for this visit. No Known Allergies    PHYSICAL EXAM:    Vitals:    /78   Ht 5' 6\" (1.676 m)   Wt 166 lb (75.3 kg)   BMI 26.79 kg/m²   General appearance/ Psychiatric: well nourished, and well groomed, normal judgement, alert, appears stated age and cooperative. MKS: Mild subjective stiffness across MCPs and PIPs, no objective finding appreciated-  No appreciable tender, swollen, inflamed joints in upper or lower extremities. Normal range of motion in peripheral joints in upper and lower extremities. Normal gait and muscle strength in upper and lower extremities. Skin-no rashes. Appears comfortable with normal breathing effort.     DATA:   Lab Results   Component Value Date    WBC 4.4 06/13/2022    HGB 14.2 06/13/2022    HCT 43.2 06/13/2022    MCV 91.3 06/13/2022     06/13/2022         Chemistry        Component Value Date/Time     12/09/2020 1041    K 4.9 12/09/2020 1041     12/09/2020 1041    CO2 24 12/09/2020 1041    BUN 25 (H) 12/09/2020 1041    CREATININE 0.9 06/13/2022 1158        Component Value Date/Time    CALCIUM 9.1 12/09/2020 1041    ALKPHOS 78 12/09/2020 1041    AST 20 06/13/2022 1158    ALT 23 06/13/2022 1158    BILITOT 0.4 12/09/2020 1041          No results found for: [facility]  Lab Results   Component Value Date    SEDRATE <1 06/13/2022     Lab Results   Component Value Date    CRP <3.0 06/13/2022     Lab Results   Component Value Date    PHAM Negative 10/07/2020    SEDRATE <1 06/13/2022     No results found for: Page [facility]  Lab Results   Component Value Date    TSH 1.94 08/28/2019        A/P- See above. Total time 31 minutes that includes the following-  Preparing to see the patient such as reviewing patients records, pre-charting, preparing the visit on the same day, performing a medically appropriate history and physical examination, counseling and educating patient about diagnosis, management plan, ordering appropriate testings, prescriptions,   and documenting clinical information in electronic medical record.

## 2022-09-12 ENCOUNTER — OFFICE VISIT (OUTPATIENT)
Dept: RHEUMATOLOGY | Age: 27
End: 2022-09-12
Payer: COMMERCIAL

## 2022-09-12 VITALS
BODY MASS INDEX: 26.68 KG/M2 | SYSTOLIC BLOOD PRESSURE: 120 MMHG | WEIGHT: 166 LBS | HEIGHT: 66 IN | DIASTOLIC BLOOD PRESSURE: 78 MMHG

## 2022-09-12 DIAGNOSIS — Z79.899 HIGH RISK MEDICATION USE: ICD-10-CM

## 2022-09-12 DIAGNOSIS — M06.00 SERONEGATIVE RHEUMATOID ARTHRITIS (HCC): Primary | ICD-10-CM

## 2022-09-12 LAB
ALT SERPL-CCNC: 23 U/L (ref 10–40)
AST SERPL-CCNC: 19 U/L (ref 15–37)
BASOPHILS ABSOLUTE: 0 K/UL (ref 0–0.2)
BASOPHILS RELATIVE PERCENT: 0.8 %
C-REACTIVE PROTEIN: <3 MG/L (ref 0–5.1)
CREAT SERPL-MCNC: 1.1 MG/DL (ref 0.9–1.3)
EOSINOPHILS ABSOLUTE: 0.3 K/UL (ref 0–0.6)
EOSINOPHILS RELATIVE PERCENT: 5.5 %
GFR AFRICAN AMERICAN: >60
GFR NON-AFRICAN AMERICAN: >60
HCT VFR BLD CALC: 43.2 % (ref 40.5–52.5)
HEMOGLOBIN: 14.4 G/DL (ref 13.5–17.5)
LYMPHOCYTES ABSOLUTE: 1.1 K/UL (ref 1–5.1)
LYMPHOCYTES RELATIVE PERCENT: 22.4 %
MCH RBC QN AUTO: 31.2 PG (ref 26–34)
MCHC RBC AUTO-ENTMCNC: 33.4 G/DL (ref 31–36)
MCV RBC AUTO: 93.4 FL (ref 80–100)
MONOCYTES ABSOLUTE: 0.5 K/UL (ref 0–1.3)
MONOCYTES RELATIVE PERCENT: 10.7 %
NEUTROPHILS ABSOLUTE: 3 K/UL (ref 1.7–7.7)
NEUTROPHILS RELATIVE PERCENT: 60.6 %
PDW BLD-RTO: 13.4 % (ref 12.4–15.4)
PLATELET # BLD: 194 K/UL (ref 135–450)
PMV BLD AUTO: 8.4 FL (ref 5–10.5)
RBC # BLD: 4.63 M/UL (ref 4.2–5.9)
SEDIMENTATION RATE, ERYTHROCYTE: <1 MM/HR (ref 0–15)
WBC # BLD: 5 K/UL (ref 4–11)

## 2022-09-12 PROCEDURE — 99214 OFFICE O/P EST MOD 30 MIN: CPT | Performed by: INTERNAL MEDICINE

## 2022-09-12 RX ORDER — SULFASALAZINE 500 MG/1
TABLET ORAL
Qty: 540 TABLET | Refills: 0 | Status: SHIPPED | OUTPATIENT
Start: 2022-09-12

## 2022-12-12 ENCOUNTER — OFFICE VISIT (OUTPATIENT)
Dept: RHEUMATOLOGY | Age: 27
End: 2022-12-12
Payer: COMMERCIAL

## 2022-12-12 VITALS
SYSTOLIC BLOOD PRESSURE: 110 MMHG | DIASTOLIC BLOOD PRESSURE: 76 MMHG | HEIGHT: 66 IN | BODY MASS INDEX: 26.68 KG/M2 | WEIGHT: 166 LBS

## 2022-12-12 DIAGNOSIS — Z79.899 HIGH RISK MEDICATION USE: ICD-10-CM

## 2022-12-12 DIAGNOSIS — M06.00 SERONEGATIVE RHEUMATOID ARTHRITIS (HCC): Primary | ICD-10-CM

## 2022-12-12 LAB
ALT SERPL-CCNC: 24 U/L (ref 10–40)
AST SERPL-CCNC: 21 U/L (ref 15–37)
BASOPHILS ABSOLUTE: 0 K/UL (ref 0–0.2)
BASOPHILS RELATIVE PERCENT: 0.7 %
C-REACTIVE PROTEIN: <3 MG/L (ref 0–5.1)
CREAT SERPL-MCNC: 0.9 MG/DL (ref 0.9–1.3)
EOSINOPHILS ABSOLUTE: 0.3 K/UL (ref 0–0.6)
EOSINOPHILS RELATIVE PERCENT: 5.4 %
GFR SERPL CREATININE-BSD FRML MDRD: >60 ML/MIN/{1.73_M2}
HCT VFR BLD CALC: 43 % (ref 40.5–52.5)
HEMOGLOBIN: 14.1 G/DL (ref 13.5–17.5)
LYMPHOCYTES ABSOLUTE: 1.2 K/UL (ref 1–5.1)
LYMPHOCYTES RELATIVE PERCENT: 21.3 %
MCH RBC QN AUTO: 30.6 PG (ref 26–34)
MCHC RBC AUTO-ENTMCNC: 32.9 G/DL (ref 31–36)
MCV RBC AUTO: 92.9 FL (ref 80–100)
MONOCYTES ABSOLUTE: 0.5 K/UL (ref 0–1.3)
MONOCYTES RELATIVE PERCENT: 9.9 %
NEUTROPHILS ABSOLUTE: 3.5 K/UL (ref 1.7–7.7)
NEUTROPHILS RELATIVE PERCENT: 62.7 %
PDW BLD-RTO: 13.2 % (ref 12.4–15.4)
PLATELET # BLD: 210 K/UL (ref 135–450)
PMV BLD AUTO: 9.4 FL (ref 5–10.5)
RBC # BLD: 4.63 M/UL (ref 4.2–5.9)
SEDIMENTATION RATE, ERYTHROCYTE: <1 MM/HR (ref 0–15)
WBC # BLD: 5.5 K/UL (ref 4–11)

## 2022-12-12 PROCEDURE — 99214 OFFICE O/P EST MOD 30 MIN: CPT | Performed by: INTERNAL MEDICINE

## 2022-12-12 RX ORDER — SULFASALAZINE 500 MG/1
TABLET ORAL
Qty: 540 TABLET | Refills: 1 | Status: SHIPPED | OUTPATIENT
Start: 2022-12-12

## 2022-12-13 NOTE — PROGRESS NOTES
[address], [place]                                                           [address] [place] [address]                                                             [phone] (P) [phone] (F)         Note is transcribed using voice recognition software. Inadvertent computerized transcription errors may be present. Patient identification: Gloria Duran: 1995,27 y.o. Sex: male     A/P  Comfort Main was seen today for follow-up. Diagnoses and all orders for this visit:    Seronegative rheumatoid arthritis ([facility] [address]    High risk medication use    Other orders  -     sulfaSALAzine (AZULFIDINE) 500 MG tablet; Take 3 tab po BID    Seronegative RA -predominantly affecting finger joints, wrist-well-controlled, continue sulfasalazine 1.5 g twice daily. Check safety labs today. Prescription refilled. Call with any flares. RTC 6 months. Labs to be checked in 3 months and today. Patient indicates understanding and agrees with the management plan. I reviewed patient's history, referral documents and electronic medical records. ###################################################################    Follow-up for inflammatory polyarthritis-negative RA serologies PHAM HLA-B27 and hepatitis serologies    Interval changes-  Is doing very well other than minor stiffness in his finger joints. No flares or daily symptoms. No side effects from medications. ADLs and recreational activities are normal.  Safety labs have been normal.        Rest of review of systems are negative.   Past Medical History:   Diagnosis Date    ADHD     Anxiety     Depression      Past Surgical History:   Procedure Laterality Date    SHOULDER SURGERY Right 12/2014         No family history of autoimmune diseases    Current Outpatient Medications   Medication Sig Dispense Refill    sulfaSALAzine (AZULFIDINE) 500 MG tablet Take 3 tab po  tablet 1    DULoxetine (CYMBALTA) 30 MG extended release capsule Take 30 capsules by mouth daily      Lisdexamfetamine Dimesylate 60 MG CAPS Take 1 capsule by mouth daily . No current facility-administered medications for this visit. No Known Allergies    PHYSICAL EXAM:    Vitals:    /76   Ht 5' 6\" (1.676 m)   Wt 166 lb (75.3 kg)   BMI 26.79 kg/m²   General appearance/ Psychiatric: well nourished, and well groomed, normal judgement, alert, appears stated age and cooperative. MKS: Normal joint findings. No appreciable tender, swollen, inflamed joints in upper or lower extremities. Normal range of motion in peripheral joints in upper and lower extremities. Normal gait and muscle strength in upper and lower extremities. Skin-no rashes. Appears comfortable with normal breathing effort. DATA:   Lab Results   Component Value Date    WBC 5.5 12/12/2022    HGB 14.1 12/12/2022    HCT 43.0 12/12/2022    MCV 92.9 12/12/2022     12/12/2022         Chemistry        Component Value Date/Time     12/09/2020 1041    K 4.9 12/09/2020 1041     12/09/2020 1041    CO2 24 12/09/2020 1041    BUN 25 (H) 12/09/2020 1041    CREATININE 0.9 12/12/2022 1104        Component Value Date/Time    CALCIUM 9.1 12/09/2020 1041    ALKPHOS 78 12/09/2020 1041    AST 21 12/12/2022 1104    ALT 24 12/12/2022 1104    BILITOT 0.4 12/09/2020 1041          No results found for: [facility]  Lab Results   Component Value Date    SEDRATE <1 12/12/2022     Lab Results   Component Value Date    CRP <3.0 12/12/2022     Lab Results   Component Value Date    PHAM Negative 10/07/2020    SEDRATE <1 12/12/2022     No results found for: [facility]  Lab Results   Component Value Date    TSH 1.94 08/28/2019        A/P- See above.     Total time 30 minutes that includes the following-  Preparing to

## 2022-12-22 RX ORDER — METHYLPREDNISOLONE 4 MG/1
TABLET ORAL
Qty: 1 KIT | Refills: 0 | Status: SHIPPED | OUTPATIENT
Start: 2022-12-22 | End: 2022-12-28

## 2023-05-23 ENCOUNTER — OFFICE VISIT (OUTPATIENT)
Dept: PRIMARY CARE CLINIC | Age: 28
End: 2023-05-23
Payer: COMMERCIAL

## 2023-05-23 VITALS
TEMPERATURE: 97.4 F | RESPIRATION RATE: 16 BRPM | WEIGHT: 170 LBS | HEIGHT: 66 IN | DIASTOLIC BLOOD PRESSURE: 72 MMHG | HEART RATE: 98 BPM | BODY MASS INDEX: 27.32 KG/M2 | OXYGEN SATURATION: 96 % | SYSTOLIC BLOOD PRESSURE: 116 MMHG

## 2023-05-23 DIAGNOSIS — Z00.00 ENCOUNTER FOR WELL ADULT EXAM WITHOUT ABNORMAL FINDINGS: Primary | ICD-10-CM

## 2023-05-23 DIAGNOSIS — F32.A DEPRESSION, UNSPECIFIED DEPRESSION TYPE: ICD-10-CM

## 2023-05-23 DIAGNOSIS — M06.00 SERONEGATIVE RHEUMATOID ARTHRITIS (HCC): ICD-10-CM

## 2023-05-23 PROCEDURE — 99395 PREV VISIT EST AGE 18-39: CPT | Performed by: INTERNAL MEDICINE

## 2023-05-23 RX ORDER — DULOXETIN HYDROCHLORIDE 30 MG/1
30 CAPSULE, DELAYED RELEASE ORAL DAILY
Qty: 90 CAPSULE | Refills: 1 | Status: SHIPPED | OUTPATIENT
Start: 2023-05-23

## 2023-05-23 SDOH — ECONOMIC STABILITY: FOOD INSECURITY: WITHIN THE PAST 12 MONTHS, THE FOOD YOU BOUGHT JUST DIDN'T LAST AND YOU DIDN'T HAVE MONEY TO GET MORE.: NEVER TRUE

## 2023-05-23 SDOH — ECONOMIC STABILITY: FOOD INSECURITY: WITHIN THE PAST 12 MONTHS, YOU WORRIED THAT YOUR FOOD WOULD RUN OUT BEFORE YOU GOT MONEY TO BUY MORE.: NEVER TRUE

## 2023-05-23 SDOH — ECONOMIC STABILITY: HOUSING INSECURITY
IN THE LAST 12 MONTHS, WAS THERE A TIME WHEN YOU DID NOT HAVE A STEADY PLACE TO SLEEP OR SLEPT IN A SHELTER (INCLUDING NOW)?: NO

## 2023-05-23 SDOH — ECONOMIC STABILITY: INCOME INSECURITY: HOW HARD IS IT FOR YOU TO PAY FOR THE VERY BASICS LIKE FOOD, HOUSING, MEDICAL CARE, AND HEATING?: NOT HARD AT ALL

## 2023-05-23 ASSESSMENT — ENCOUNTER SYMPTOMS
CHEST TIGHTNESS: 0
SINUS PRESSURE: 0
CONSTIPATION: 0
EYE PAIN: 0
CHOKING: 0
SORE THROAT: 0
APNEA: 0
RHINORRHEA: 0
COUGH: 0
SINUS PAIN: 0
EYE DISCHARGE: 0
ANAL BLEEDING: 0
TROUBLE SWALLOWING: 0
RECTAL PAIN: 0
ABDOMINAL PAIN: 0
BLOOD IN STOOL: 0
EYE ITCHING: 0
BACK PAIN: 0
EYE REDNESS: 0
VOMITING: 0
PHOTOPHOBIA: 0
NAUSEA: 0
FACIAL SWELLING: 0
WHEEZING: 0
SHORTNESS OF BREATH: 0
ABDOMINAL DISTENTION: 0
DIARRHEA: 0
VOICE CHANGE: 0

## 2023-05-23 ASSESSMENT — PATIENT HEALTH QUESTIONNAIRE - PHQ9
6. FEELING BAD ABOUT YOURSELF - OR THAT YOU ARE A FAILURE OR HAVE LET YOURSELF OR YOUR FAMILY DOWN: 0
2. FEELING DOWN, DEPRESSED OR HOPELESS: 0
9. THOUGHTS THAT YOU WOULD BE BETTER OFF DEAD, OR OF HURTING YOURSELF: 0
1. LITTLE INTEREST OR PLEASURE IN DOING THINGS: 0
8. MOVING OR SPEAKING SO SLOWLY THAT OTHER PEOPLE COULD HAVE NOTICED. OR THE OPPOSITE, BEING SO FIGETY OR RESTLESS THAT YOU HAVE BEEN MOVING AROUND A LOT MORE THAN USUAL: 0
SUM OF ALL RESPONSES TO PHQ QUESTIONS 1-9: 0
SUM OF ALL RESPONSES TO PHQ9 QUESTIONS 1 & 2: 0
3. TROUBLE FALLING OR STAYING ASLEEP: 0
SUM OF ALL RESPONSES TO PHQ QUESTIONS 1-9: 0
7. TROUBLE CONCENTRATING ON THINGS, SUCH AS READING THE NEWSPAPER OR WATCHING TELEVISION: 0
4. FEELING TIRED OR HAVING LITTLE ENERGY: 0
5. POOR APPETITE OR OVEREATING: 0
SUM OF ALL RESPONSES TO PHQ QUESTIONS 1-9: 0
SUM OF ALL RESPONSES TO PHQ QUESTIONS 1-9: 0

## 2023-06-09 RX ORDER — SULFASALAZINE 500 MG/1
TABLET ORAL
Qty: 540 TABLET | Refills: 1 | OUTPATIENT
Start: 2023-06-09

## 2023-06-09 NOTE — TELEPHONE ENCOUNTER
Chase Lu is available at her new location to address any patient needs. Directed pharmacy to reach out to the new location for regarding this refill.

## 2023-12-19 PROBLEM — F41.9 ANXIETY: Status: ACTIVE | Noted: 2023-12-19

## 2024-01-09 ENCOUNTER — OFFICE VISIT (OUTPATIENT)
Dept: PRIMARY CARE CLINIC | Age: 29
End: 2024-01-09
Payer: COMMERCIAL

## 2024-01-09 VITALS
WEIGHT: 175 LBS | BODY MASS INDEX: 28.25 KG/M2 | SYSTOLIC BLOOD PRESSURE: 134 MMHG | DIASTOLIC BLOOD PRESSURE: 80 MMHG | OXYGEN SATURATION: 96 % | HEART RATE: 93 BPM

## 2024-01-09 DIAGNOSIS — H93.8X3 EAR PRESSURE, BILATERAL: Primary | ICD-10-CM

## 2024-01-09 DIAGNOSIS — H93.13 TINNITUS OF BOTH EARS: ICD-10-CM

## 2024-01-09 PROCEDURE — 99213 OFFICE O/P EST LOW 20 MIN: CPT | Performed by: INTERNAL MEDICINE

## 2024-01-09 RX ORDER — GUAIFENESIN, PSEUDOEPHEDRINE HYDROCHLORIDE 600; 60 MG/1; MG/1
1 TABLET, EXTENDED RELEASE ORAL 2 TIMES DAILY PRN
Qty: 18 TABLET | Refills: 0 | Status: SHIPPED | OUTPATIENT
Start: 2024-01-09

## 2024-01-09 ASSESSMENT — PATIENT HEALTH QUESTIONNAIRE - PHQ9
SUM OF ALL RESPONSES TO PHQ QUESTIONS 1-9: 0
9. THOUGHTS THAT YOU WOULD BE BETTER OFF DEAD, OR OF HURTING YOURSELF: 0
2. FEELING DOWN, DEPRESSED OR HOPELESS: 0
7. TROUBLE CONCENTRATING ON THINGS, SUCH AS READING THE NEWSPAPER OR WATCHING TELEVISION: 0
SUM OF ALL RESPONSES TO PHQ9 QUESTIONS 1 & 2: 0
SUM OF ALL RESPONSES TO PHQ QUESTIONS 1-9: 0
4. FEELING TIRED OR HAVING LITTLE ENERGY: 0
SUM OF ALL RESPONSES TO PHQ QUESTIONS 1-9: 0
10. IF YOU CHECKED OFF ANY PROBLEMS, HOW DIFFICULT HAVE THESE PROBLEMS MADE IT FOR YOU TO DO YOUR WORK, TAKE CARE OF THINGS AT HOME, OR GET ALONG WITH OTHER PEOPLE: 0
8. MOVING OR SPEAKING SO SLOWLY THAT OTHER PEOPLE COULD HAVE NOTICED. OR THE OPPOSITE, BEING SO FIGETY OR RESTLESS THAT YOU HAVE BEEN MOVING AROUND A LOT MORE THAN USUAL: 0
1. LITTLE INTEREST OR PLEASURE IN DOING THINGS: 0
SUM OF ALL RESPONSES TO PHQ QUESTIONS 1-9: 0
6. FEELING BAD ABOUT YOURSELF - OR THAT YOU ARE A FAILURE OR HAVE LET YOURSELF OR YOUR FAMILY DOWN: 0
5. POOR APPETITE OR OVEREATING: 0
3. TROUBLE FALLING OR STAYING ASLEEP: 0

## 2024-01-09 NOTE — PROGRESS NOTES
Date of Visit: 2024    Trino Nelson (:  1995) is a 28 y.o. male,  Established patient here for evaluation of the following chief complaint(s):  Tinnitus (Bilateral )      ASSESSMENT/PLAN:    1. Ear pressure, bilateral  -ear exam is normal  -Start pseudoephedrine-guaiFENesin (MUCINEX D)  MG per extended release tablet; Take 1 tablet by mouth 2 times daily as needed for Congestion  Dispense: 18 tablet; Refill: 0  - Referral to  Jean Collins DO, Otolaryngology, [facility]    2. Tinnitus of both ears  [facility]        Return if symptoms worsen or fail to improve.    SUBJECTIVE:    Patient states both ears feel pressure and ringing since Saturday. Patient states it has been constant. Patient denies hearing loss, ear pain, and fever.         Review of Systems   Constitutional:  Negative for chills and fever.   HENT:  Negative for congestion, ear discharge, ear pain, hearing loss, postnasal drip, rhinorrhea, sinus pressure, sinus pain, sneezing and sore throat.    Respiratory:  Negative for cough, chest tightness, shortness of breath and wheezing.    Neurological:  Negative for headaches.       No Known Allergies    Outpatient Medications Marked as Taking for the 24 encounter (Office Visit) with Gregorio Jay MD   Medication Sig Dispense Refill    DULoxetine (CYMBALTA) 20 MG extended release capsule Take 2 capsules by mouth daily 180 capsule 1    sulfaSALAzine (AZULFIDINE) 500 MG tablet Take 3 tab po  tablet 1       Social History     Tobacco Use    Smoking status: Never    Smokeless tobacco: Never   Vaping Use    Vaping Use: Never used   Substance Use Topics    Alcohol use: Yes     Alcohol/week: 4.0 standard drinks of alcohol     Types: 4 Cans of beer per week     Comment: Social         OBJECTIVE:    Vitals:    24 0813   BP: 134/80   Pulse: 93   SpO2: 96%   Weight: 79.4 kg (175 lb)

## 2024-01-15 PROBLEM — H93.13 TINNITUS OF BOTH EARS: Status: ACTIVE | Noted: 2024-01-15

## 2024-01-15 PROBLEM — H93.8X3 EAR PRESSURE, BILATERAL: Status: ACTIVE | Noted: 2024-01-15

## 2024-01-15 ASSESSMENT — ENCOUNTER SYMPTOMS
SORE THROAT: 0
SINUS PAIN: 0
WHEEZING: 0
RHINORRHEA: 0
SINUS PRESSURE: 0
COUGH: 0
CHEST TIGHTNESS: 0
SHORTNESS OF BREATH: 0

## 2024-03-18 ENCOUNTER — TELEPHONE (OUTPATIENT)
Dept: PRIMARY CARE CLINIC | Age: 29
End: 2024-03-18

## 2024-07-25 DIAGNOSIS — F41.9 ANXIETY: ICD-10-CM

## 2024-07-25 DIAGNOSIS — F32.A DEPRESSION, UNSPECIFIED DEPRESSION TYPE: ICD-10-CM

## 2024-07-25 RX ORDER — DULOXETIN HYDROCHLORIDE 20 MG/1
40 CAPSULE, DELAYED RELEASE ORAL DAILY
Qty: 60 CAPSULE | Refills: 0 | Status: SHIPPED | OUTPATIENT
Start: 2024-07-25 | End: 2024-09-24 | Stop reason: SDUPTHER

## 2024-08-26 ENCOUNTER — PATIENT MESSAGE (OUTPATIENT)
Dept: PRIMARY CARE CLINIC | Age: 29
End: 2024-08-26

## 2024-09-23 SDOH — ECONOMIC STABILITY: FOOD INSECURITY: WITHIN THE PAST 12 MONTHS, YOU WORRIED THAT YOUR FOOD WOULD RUN OUT BEFORE YOU GOT MONEY TO BUY MORE.: NEVER TRUE

## 2024-09-23 SDOH — ECONOMIC STABILITY: FOOD INSECURITY: WITHIN THE PAST 12 MONTHS, THE FOOD YOU BOUGHT JUST DIDN'T LAST AND YOU DIDN'T HAVE MONEY TO GET MORE.: NEVER TRUE

## 2024-09-23 SDOH — ECONOMIC STABILITY: INCOME INSECURITY: HOW HARD IS IT FOR YOU TO PAY FOR THE VERY BASICS LIKE FOOD, HOUSING, MEDICAL CARE, AND HEATING?: NOT VERY HARD

## 2024-09-24 ENCOUNTER — TELEMEDICINE (OUTPATIENT)
Dept: PRIMARY CARE CLINIC | Age: 29
End: 2024-09-24
Payer: COMMERCIAL

## 2024-09-24 DIAGNOSIS — F41.9 ANXIETY: ICD-10-CM

## 2024-09-24 DIAGNOSIS — I31.39 PERICARDIAL EFFUSION: ICD-10-CM

## 2024-09-24 DIAGNOSIS — F32.A DEPRESSION, UNSPECIFIED DEPRESSION TYPE: Primary | ICD-10-CM

## 2024-09-24 DIAGNOSIS — M06.00 SERONEGATIVE RHEUMATOID ARTHRITIS (HCC): ICD-10-CM

## 2024-09-24 PROCEDURE — 99214 OFFICE O/P EST MOD 30 MIN: CPT | Performed by: INTERNAL MEDICINE

## 2024-09-24 RX ORDER — DULOXETIN HYDROCHLORIDE 20 MG/1
20 CAPSULE, DELAYED RELEASE ORAL DAILY
Qty: 90 CAPSULE | Refills: 1 | Status: SHIPPED | OUTPATIENT
Start: 2024-09-24

## 2024-09-24 NOTE — PROGRESS NOTES
hematuria.   Musculoskeletal:  Negative for arthralgias and myalgias.   Neurological:  Negative for dizziness, syncope, light-headedness, numbness and headaches.   Psychiatric/Behavioral:  Positive for dysphoric mood (stable). Negative for decreased concentration and sleep disturbance. The patient is nervous/anxious (stable).           Objective   Patient-Reported Vitals  No data recorded        9/23/2024     3:28 PM   Patient-Reported Vitals   Patient-Reported Weight 180   Patient-Reported Height 5’6”         Physical Exam  [INSTRUCTIONS:  \"[x]\" Indicates a positive item  \"[]\" Indicates a negative item  -- DELETE ALL ITEMS NOT EXAMINED]    Constitutional: [x] Appears well-developed and well-nourished [x] No apparent distress      [] Abnormal -     Mental status: [x] Alert and awake  [x] Oriented to person/place/time [x] Able to follow commands    [] Abnormal -     Eyes:   EOM    [x]  Normal    [] Abnormal -   Sclera  [x]  Normal    [] Abnormal -          Discharge [x]  None visible   [] Abnormal -     HENT: [x] Normocephalic, atraumatic  [] Abnormal -   [x] Mouth/Throat: Mucous membranes are moist    External Ears [x] Normal  [] Abnormal -    Neck: [x] No visualized mass [] Abnormal -     Pulmonary/Chest: [x] Respiratory effort normal   [x] No visualized signs of difficulty breathing or respiratory distress        [] Abnormal -      Musculoskeletal:   [x] Normal gait with no signs of ataxia         [x] Normal range of motion of neck        [] Abnormal -     Neurological:        [x] No Facial Asymmetry (Cranial nerve 7 motor function) (limited exam due to video visit)          [x] No gaze palsy        [] Abnormal -          Skin:        [x] No significant exanthematous lesions or discoloration noted on facial skin         [] Abnormal -            Psychiatric:       [x] Normal Affect [] Abnormal -        [x] No Hallucinations    Other pertinent observable physical exam findings:-             --Gregorio Jay MD

## 2024-09-24 NOTE — ASSESSMENT & PLAN NOTE
-Stable  -managed by Rheumatology  -Continue sulfasalazine prescribed by Rheumatology  -Continue care per Rheumatology

## 2024-09-24 NOTE — ASSESSMENT & PLAN NOTE
-Stable  -Continue Cymbalta 20 mg once daily    Orders:    DULoxetine (CYMBALTA) 20 MG extended release capsule; Take 1 capsule by mouth daily

## 2024-09-30 PROBLEM — I31.39 PERICARDIAL EFFUSION: Status: ACTIVE | Noted: 2024-09-30

## 2024-09-30 ASSESSMENT — ENCOUNTER SYMPTOMS
CHEST TIGHTNESS: 0
RHINORRHEA: 0
COUGH: 0
SORE THROAT: 0
SHORTNESS OF BREATH: 1
ABDOMINAL PAIN: 0
DIARRHEA: 0
CONSTIPATION: 0
VOMITING: 0
WHEEZING: 0
NAUSEA: 0

## 2024-09-30 NOTE — ASSESSMENT & PLAN NOTE
-Patient has had hospital admissions for recurrent pericardial effusion  -Hospital records reviewed  -Patient is status post Left Video-Assisted Thoracoscopy and Pericardial Window  -Continue prednisone taper  -Continue colchicine  -Continue pantoprazole  -Continue care per Cardiology

## 2025-04-12 DIAGNOSIS — F41.9 ANXIETY: ICD-10-CM

## 2025-04-12 DIAGNOSIS — F32.A DEPRESSION, UNSPECIFIED DEPRESSION TYPE: ICD-10-CM

## 2025-04-12 NOTE — TELEPHONE ENCOUNTER
Medication:   Requested Prescriptions     Pending Prescriptions Disp Refills    DULoxetine (CYMBALTA) 20 MG extended release capsule [Pharmacy Med Name: DULOXETINE DR 20MG CAPSULES] 90 capsule 1     Sig: TAKE 1 CAPSULE BY MOUTH DAILY     Last Filled:  09/24/24  Sent patient message about over due appt (Return in about 3 months around 12/24/2024 for annual physical exam.)  Last appt: 9/24/2024   Next appt: Visit date not found    Last Labs DM:   Lab Results   Component Value Date/Time    LABA1C 4.8 02/06/2020 11:49 AM     Last Lipid:   Lab Results   Component Value Date/Time    CHOL 90 08/28/2019 11:27 AM    TRIG 134 08/28/2019 11:27 AM    HDL 32 08/28/2019 11:27 AM     Last PSA: No results found for: \"PSA\"  Last Thyroid:   Lab Results   Component Value Date/Time    TSH 1.94 08/28/2019 11:27 AM

## 2025-04-14 RX ORDER — DULOXETIN HYDROCHLORIDE 20 MG/1
20 CAPSULE, DELAYED RELEASE ORAL DAILY
Qty: 30 CAPSULE | Refills: 0 | Status: SHIPPED | OUTPATIENT
Start: 2025-04-14

## 2025-04-21 ENCOUNTER — OFFICE VISIT (OUTPATIENT)
Dept: PRIMARY CARE CLINIC | Age: 30
End: 2025-04-21
Payer: COMMERCIAL

## 2025-04-21 VITALS
BODY MASS INDEX: 28.28 KG/M2 | HEIGHT: 66 IN | OXYGEN SATURATION: 96 % | RESPIRATION RATE: 16 BRPM | WEIGHT: 176 LBS | SYSTOLIC BLOOD PRESSURE: 138 MMHG | DIASTOLIC BLOOD PRESSURE: 88 MMHG | HEART RATE: 102 BPM

## 2025-04-21 DIAGNOSIS — I31.39 PERICARDIAL EFFUSION: ICD-10-CM

## 2025-04-21 DIAGNOSIS — F32.A DEPRESSION, UNSPECIFIED DEPRESSION TYPE: Primary | ICD-10-CM

## 2025-04-21 DIAGNOSIS — R07.9 CHEST PAIN, UNSPECIFIED TYPE: ICD-10-CM

## 2025-04-21 DIAGNOSIS — R05.9 COUGH, UNSPECIFIED TYPE: ICD-10-CM

## 2025-04-21 DIAGNOSIS — M06.00 SERONEGATIVE RHEUMATOID ARTHRITIS (HCC): ICD-10-CM

## 2025-04-21 DIAGNOSIS — R06.02 SHORTNESS OF BREATH: ICD-10-CM

## 2025-04-21 DIAGNOSIS — F41.9 ANXIETY: ICD-10-CM

## 2025-04-21 PROCEDURE — 99214 OFFICE O/P EST MOD 30 MIN: CPT | Performed by: INTERNAL MEDICINE

## 2025-04-21 RX ORDER — PREDNISONE 5 MG/1
5 TABLET ORAL DAILY
COMMUNITY
Start: 2025-02-12

## 2025-04-21 RX ORDER — DULOXETIN HYDROCHLORIDE 20 MG/1
20 CAPSULE, DELAYED RELEASE ORAL DAILY
Qty: 90 CAPSULE | Refills: 1 | Status: SHIPPED | OUTPATIENT
Start: 2025-04-21

## 2025-04-21 RX ORDER — BENZONATATE 100 MG/1
100 CAPSULE ORAL 3 TIMES DAILY PRN
Qty: 30 CAPSULE | Refills: 0 | Status: SHIPPED | OUTPATIENT
Start: 2025-04-21

## 2025-04-21 SDOH — ECONOMIC STABILITY: FOOD INSECURITY: WITHIN THE PAST 12 MONTHS, THE FOOD YOU BOUGHT JUST DIDN'T LAST AND YOU DIDN'T HAVE MONEY TO GET MORE.: NEVER TRUE

## 2025-04-21 SDOH — ECONOMIC STABILITY: FOOD INSECURITY: WITHIN THE PAST 12 MONTHS, YOU WORRIED THAT YOUR FOOD WOULD RUN OUT BEFORE YOU GOT MONEY TO BUY MORE.: NEVER TRUE

## 2025-04-21 ASSESSMENT — PATIENT HEALTH QUESTIONNAIRE - PHQ9
1. LITTLE INTEREST OR PLEASURE IN DOING THINGS: NOT AT ALL
6. FEELING BAD ABOUT YOURSELF - OR THAT YOU ARE A FAILURE OR HAVE LET YOURSELF OR YOUR FAMILY DOWN: NOT AT ALL
8. MOVING OR SPEAKING SO SLOWLY THAT OTHER PEOPLE COULD HAVE NOTICED. OR THE OPPOSITE, BEING SO FIGETY OR RESTLESS THAT YOU HAVE BEEN MOVING AROUND A LOT MORE THAN USUAL: NOT AT ALL
SUM OF ALL RESPONSES TO PHQ QUESTIONS 1-9: 0
DEPRESSION UNABLE TO ASSESS: PT REFUSES
SUM OF ALL RESPONSES TO PHQ QUESTIONS 1-9: 0
2. FEELING DOWN, DEPRESSED OR HOPELESS: NOT AT ALL
5. POOR APPETITE OR OVEREATING: NOT AT ALL
3. TROUBLE FALLING OR STAYING ASLEEP: NOT AT ALL
7. TROUBLE CONCENTRATING ON THINGS, SUCH AS READING THE NEWSPAPER OR WATCHING TELEVISION: NOT AT ALL
10. IF YOU CHECKED OFF ANY PROBLEMS, HOW DIFFICULT HAVE THESE PROBLEMS MADE IT FOR YOU TO DO YOUR WORK, TAKE CARE OF THINGS AT HOME, OR GET ALONG WITH OTHER PEOPLE: NOT DIFFICULT AT ALL
SUM OF ALL RESPONSES TO PHQ QUESTIONS 1-9: 0
4. FEELING TIRED OR HAVING LITTLE ENERGY: NOT AT ALL
SUM OF ALL RESPONSES TO PHQ QUESTIONS 1-9: 0
9. THOUGHTS THAT YOU WOULD BE BETTER OFF DEAD, OR OF HURTING YOURSELF: NOT AT ALL

## 2025-04-21 NOTE — PROGRESS NOTES
Date of Visit: 2025    Trino Nelson (:  1995) is a 29 y.o. male,  Established patient here for evaluation of the following chief complaint(s):  Depression, Anxiety, and Referral - General (Cardiologist /Pulmonologist )      ASSESSMENT/PLAN:    Assessment & Plan  1. Depression  - Stable  - continue duloxetine 20 mg once daily    2. Anxiety   - Stable  - continue duloxetine 20 mg once daily    3. Seronegative rheumatoid arthritis  - Condition is stable but fluctuates, with some days being better than others   - Continue sulfasalazine and prednisone 5 mg daily  - Continues care per rheumatologist    4. Pericardial effusion  - Reports ongoing chest pain and shortness since surgery for pericardial effusion in 2024  - Referral to cardiology for further evaluation of chest pain and pericardial effusion    5. Shortness of breath   - chest xray  - Referral to pulmonary     6. Cough   - chest xray  - Tessalon Perles 100 mg 3 times daily as needed  - referral to pulmonary    7. Chest pain  - history of pericardial effusion  - referral to Cardiology          Return in about 3 months (around 2025) for annual physical exam.    SUBJECTIVE:    History of Present Illness  The patient presents for a follow-up regarding anxiety and depression, as well as to obtain a refill for duloxetine. An additional complaint of needing referrals is noted.    Satisfactory management of anxiety and depression is reported, with no episodes of crying, sadness, nervousness, or jitteriness. The current regimen includes duloxetine 20 mg once daily.    Consultations with a rheumatologist continue for the management of seronegative rheumatoid arthritis. The condition fluctuates, with some days being better than others. The patient is on sulfasalazine and prednisone 5 mg daily, prescribed by the rheumatologist.    Surgery for pericardial effusion was performed in the , related to rheumatoid arthritis. Referrals

## 2025-04-23 ENCOUNTER — TELEPHONE (OUTPATIENT)
Dept: CARDIOLOGY CLINIC | Age: 30
End: 2025-04-23

## 2025-04-23 NOTE — TELEPHONE ENCOUNTER
New Patient Referral    Referring Provider Name: Deanrickey Jay   Phone Number: [phone]   Fax Number:  Address:  [facility] [address], [place] [address]   Diagnosis/Reason for Visit: Pericardial effusion/Chest pain     Cardiac Clearance? no    Cardiac Testing: was at [facility]    Date testing was completed?___________      Have records been requested? (Yes/No)    Preferred Language: English    LM for pt to call and schedule w/1st avail provider

## 2025-04-28 PROBLEM — R07.9 CHEST PAIN: Status: ACTIVE | Noted: 2025-04-28

## 2025-04-28 PROBLEM — R06.02 SHORTNESS OF BREATH: Status: ACTIVE | Noted: 2025-04-28

## 2025-04-28 PROBLEM — R05.9 COUGH: Status: ACTIVE | Noted: 2025-04-28

## 2025-04-28 ASSESSMENT — ENCOUNTER SYMPTOMS
CHEST TIGHTNESS: 0
SHORTNESS OF BREATH: 1
COUGH: 1
NAUSEA: 0
ABDOMINAL PAIN: 0
VOMITING: 0

## 2025-05-02 NOTE — TELEPHONE ENCOUNTER
Called and left 3rd/final voicemail.  Adv pt to call and schedule w/cardiology if he would like appt.

## 2025-05-05 ENCOUNTER — TELEPHONE (OUTPATIENT)
Dept: PRIMARY CARE CLINIC | Age: 30
End: 2025-05-05

## 2025-05-05 NOTE — TELEPHONE ENCOUNTER
Called pcp office, spoke to marely Calderon pt not returning call to schedule   Detail Level: Detailed

## 2025-05-05 NOTE — TELEPHONE ENCOUNTER
Fredi renee called for the patient and stated to inform that they have called and left     Voice msg in three different numbers for cardiology referral , but they did not receive any response yet.    Pl review    thanks

## 2025-05-05 NOTE — TELEPHONE ENCOUNTER
Call patient regarding this message about the cardiology referral. I referred him to Dr. Alpesh Gonzalez and his office has not been able to reach patient to schedule.

## 2025-05-28 PROBLEM — R05.9 COUGH: Status: RESOLVED | Noted: 2025-04-28 | Resolved: 2025-05-28

## 2025-06-06 ENCOUNTER — OFFICE VISIT (OUTPATIENT)
Dept: PULMONOLOGY | Age: 30
End: 2025-06-06
Payer: COMMERCIAL

## 2025-06-06 VITALS
DIASTOLIC BLOOD PRESSURE: 76 MMHG | OXYGEN SATURATION: 98 % | WEIGHT: 175.2 LBS | HEART RATE: 113 BPM | HEIGHT: 66 IN | SYSTOLIC BLOOD PRESSURE: 118 MMHG | BODY MASS INDEX: 28.16 KG/M2

## 2025-06-06 DIAGNOSIS — I31.39 PERICARDIAL EFFUSION: ICD-10-CM

## 2025-06-06 DIAGNOSIS — M06.00 SERONEGATIVE RHEUMATOID ARTHRITIS (HCC): ICD-10-CM

## 2025-06-06 DIAGNOSIS — J90 PLEURAL EFFUSION: ICD-10-CM

## 2025-06-06 DIAGNOSIS — R06.02 SOB (SHORTNESS OF BREATH): Primary | ICD-10-CM

## 2025-06-06 PROCEDURE — 99204 OFFICE O/P NEW MOD 45 MIN: CPT | Performed by: INTERNAL MEDICINE

## 2025-06-06 ASSESSMENT — ENCOUNTER SYMPTOMS
DIARRHEA: 0
CONSTIPATION: 0
SINUS PRESSURE: 0
ABDOMINAL PAIN: 0
NAUSEA: 0

## 2025-07-25 ENCOUNTER — OFFICE VISIT (OUTPATIENT)
Dept: PRIMARY CARE CLINIC | Age: 30
End: 2025-07-25
Payer: COMMERCIAL

## 2025-07-25 VITALS
HEART RATE: 99 BPM | OXYGEN SATURATION: 96 % | HEIGHT: 66 IN | SYSTOLIC BLOOD PRESSURE: 130 MMHG | TEMPERATURE: 97.3 F | DIASTOLIC BLOOD PRESSURE: 88 MMHG | RESPIRATION RATE: 16 BRPM | BODY MASS INDEX: 28.28 KG/M2 | WEIGHT: 176 LBS

## 2025-07-25 DIAGNOSIS — F41.9 ANXIETY: ICD-10-CM

## 2025-07-25 DIAGNOSIS — M06.00 SERONEGATIVE RHEUMATOID ARTHRITIS (HCC): ICD-10-CM

## 2025-07-25 DIAGNOSIS — F32.A DEPRESSION, UNSPECIFIED DEPRESSION TYPE: ICD-10-CM

## 2025-07-25 DIAGNOSIS — Z00.00 ENCOUNTER FOR WELL ADULT EXAM WITHOUT ABNORMAL FINDINGS: Primary | ICD-10-CM

## 2025-07-25 DIAGNOSIS — Z13.1 SCREENING FOR DIABETES MELLITUS: ICD-10-CM

## 2025-07-25 PROCEDURE — 99395 PREV VISIT EST AGE 18-39: CPT | Performed by: INTERNAL MEDICINE

## 2025-07-25 ASSESSMENT — PATIENT HEALTH QUESTIONNAIRE - PHQ9
1. LITTLE INTEREST OR PLEASURE IN DOING THINGS: NOT AT ALL
3. TROUBLE FALLING OR STAYING ASLEEP: NOT AT ALL
2. FEELING DOWN, DEPRESSED OR HOPELESS: NOT AT ALL
SUM OF ALL RESPONSES TO PHQ QUESTIONS 1-9: 0
9. THOUGHTS THAT YOU WOULD BE BETTER OFF DEAD, OR OF HURTING YOURSELF: NOT AT ALL
10. IF YOU CHECKED OFF ANY PROBLEMS, HOW DIFFICULT HAVE THESE PROBLEMS MADE IT FOR YOU TO DO YOUR WORK, TAKE CARE OF THINGS AT HOME, OR GET ALONG WITH OTHER PEOPLE: NOT DIFFICULT AT ALL
SUM OF ALL RESPONSES TO PHQ QUESTIONS 1-9: 0
4. FEELING TIRED OR HAVING LITTLE ENERGY: NOT AT ALL
5. POOR APPETITE OR OVEREATING: NOT AT ALL
8. MOVING OR SPEAKING SO SLOWLY THAT OTHER PEOPLE COULD HAVE NOTICED. OR THE OPPOSITE, BEING SO FIGETY OR RESTLESS THAT YOU HAVE BEEN MOVING AROUND A LOT MORE THAN USUAL: NOT AT ALL
6. FEELING BAD ABOUT YOURSELF - OR THAT YOU ARE A FAILURE OR HAVE LET YOURSELF OR YOUR FAMILY DOWN: NOT AT ALL
7. TROUBLE CONCENTRATING ON THINGS, SUCH AS READING THE NEWSPAPER OR WATCHING TELEVISION: NOT AT ALL

## 2025-07-25 NOTE — PROGRESS NOTES
Well Adult Note  Name: Trino Nelson Today’s Date: 2025   MRN: [id number] Sex: Male   Age: 30 y.o. Ethnicity: Non- / Non    : 1995 Race: White (non-)      Trino Nelson is here for a well adult exam.          Assessment & Plan  1. Encounter for well adult exam:  - Comprehensive metabolic panel and CBC were done recently by rheumatology per patient and results requested  - Lipid panel  - TSH  - urinalysis   - immunizations are up-to-date    2. Anxiety:  - stable  - continue Cymbalta 20 mg once daily     3. Depression:  - stable.  - Continue Cymbalta 20 mg once daily     4. Screening for diabetes mellitus:  - Hemoglobin A1c    5. Seronegative rheumatoid arthritis:  - managed by Rheumatology  - Currently on methotrexate, sulfasalazine, and prednisone  - most recent consult note requested           Return in about 6 months (around 2026) for anxiety and depression.     Subjective   History of Present Illness  The patient presents for an annual physical exam.    He has a history of depression and anxiety, managed with duloxetine 20 mg daily. The medication appears to be effective, and he does not require a refill at this time.    He is under the care of a rheumatologist for seronegative rheumatoid arthritis. His current treatment regimen includes sulfasalazine, prednisone, and methotrexate, the latter being a recent addition. Despite this, his inflammatory markers have not reached the desired levels. He experiences joint pain.    He also reports mild chest pain and occasional shortness of breath. His cough has significantly reduced, and he no longer experiences heart racing. He has seen pulmonary since last visit for pleural and pericardial effusion.    Review of Systems   Constitutional:  Negative for activity change, appetite change, chills, diaphoresis, fatigue, fever and unexpected weight change.   HENT:  Negative for congestion, ear discharge, ear pain, facial swelling,

## 2025-08-03 PROBLEM — Z13.1 SCREENING FOR DIABETES MELLITUS: Status: ACTIVE | Noted: 2025-08-03

## 2025-08-03 PROBLEM — Z00.00 ENCOUNTER FOR WELL ADULT EXAM WITHOUT ABNORMAL FINDINGS: Status: ACTIVE | Noted: 2025-08-03
